# Patient Record
Sex: MALE | Race: WHITE | Employment: UNEMPLOYED | URBAN - METROPOLITAN AREA
[De-identification: names, ages, dates, MRNs, and addresses within clinical notes are randomized per-mention and may not be internally consistent; named-entity substitution may affect disease eponyms.]

---

## 2017-05-30 ENCOUNTER — TELEPHONE (OUTPATIENT)
Dept: FAMILY MEDICINE | Facility: CLINIC | Age: 57
End: 2017-05-30

## 2017-05-30 DIAGNOSIS — R23.9 SKIN CHANGE: Primary | ICD-10-CM

## 2017-05-30 NOTE — TELEPHONE ENCOUNTER
Patient has an appt scheduled in [place] for dermatology on 6/6. Patient needs to have a referral entered before they can be seen.    Erin Vanegas,   [facility]

## 2017-06-06 ENCOUNTER — OFFICE VISIT (OUTPATIENT)
Dept: DERMATOLOGY | Facility: CLINIC | Age: 57
End: 2017-06-06
Payer: COMMERCIAL

## 2017-06-06 DIAGNOSIS — L81.4 SOLAR LENTIGO: ICD-10-CM

## 2017-06-06 DIAGNOSIS — D48.5 NEOPLASM OF UNCERTAIN BEHAVIOR OF SKIN: ICD-10-CM

## 2017-06-06 DIAGNOSIS — L82.1 SEBORRHEIC KERATOSIS: ICD-10-CM

## 2017-06-06 DIAGNOSIS — L72.0 EIC (EPIDERMAL INCLUSION CYST): ICD-10-CM

## 2017-06-06 DIAGNOSIS — L57.0 AK (ACTINIC KERATOSIS): Primary | ICD-10-CM

## 2017-06-06 DIAGNOSIS — L21.9 DERMATITIS, SEBORRHEIC: ICD-10-CM

## 2017-06-06 PROCEDURE — 10040 EXTRACTION: CPT | Performed by: DERMATOLOGY

## 2017-06-06 PROCEDURE — 88342 IMHCHEM/IMCYTCHM 1ST ANTB: CPT | Mod: TC | Performed by: DERMATOLOGY

## 2017-06-06 PROCEDURE — 17003 DESTRUCT PREMALG LES 2-14: CPT | Performed by: DERMATOLOGY

## 2017-06-06 PROCEDURE — 88305 TISSUE EXAM BY PATHOLOGIST: CPT | Mod: TC | Performed by: DERMATOLOGY

## 2017-06-06 PROCEDURE — 17000 DESTRUCT PREMALG LESION: CPT | Performed by: DERMATOLOGY

## 2017-06-06 PROCEDURE — 88341 IMHCHEM/IMCYTCHM EA ADD ANTB: CPT | Mod: TC | Performed by: DERMATOLOGY

## 2017-06-06 PROCEDURE — 99243 OFF/OP CNSLTJ NEW/EST LOW 30: CPT | Mod: 25 | Performed by: DERMATOLOGY

## 2017-06-06 PROCEDURE — 11100 HC BIOPSY SKIN/SUBQ/MUC MEM, SINGLE LESION: CPT | Mod: 59 | Performed by: DERMATOLOGY

## 2017-06-06 RX ORDER — MOMETASONE FUROATE 1 MG/ML
SOLUTION TOPICAL
Qty: 60 ML | Refills: 5 | Status: SHIPPED | OUTPATIENT
Start: 2017-06-06 | End: 2019-12-05

## 2017-06-06 RX ORDER — PHENOL 1.4 %
10 AEROSOL, SPRAY (ML) MUCOUS MEMBRANE AT BEDTIME
COMMUNITY
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
## 2017-06-06 NOTE — MR AVS SNAPSHOT
After Visit Summary   6/6/2017    Ronald Ingram    MRN: [id number]           Patient Information     Date Of Birth          1960        Visit Information        Provider Department      6/6/2017 11:45 AM Rosa Jaquez MD [facility]        Today's Diagnoses     AK (actinic keratosis)    -  1    Solar lentigo        Seborrheic keratosis          Care Instructions                    Pediatric Dermatology  [facility]  [address]  1st [place]  [address]  Phone: [phone]    Pediatric & Adult Dermatology  [facility]  [address]  2nd Floor  [facility] [address]  [phone]                  General information: Dr. Rosa Jaquez is a board-certified dermatologist with subspecialty certification in pediatric dermatology.     Scheduling and Nurse Triage: Dr. Jaquez sees pediatric patients on Mondays in [place] and adult and pediatric patients on Tuesdays in [place]. The remainder of the week she practices at the [facility]. Please call the above phone numbers to schedule or to talk to a nurse.     -For scheduling at the [place] or [place] locations, or to talk to the triage nurse please call the above phone number at the clinic where you were seen.     -For medication refills, please call your pharmacy.       WOUND CARE: Wound Care After a Biopsy    How should I care for my wound for the first 24 hours?    If it bleeds, hold direct pressure on the area for 10 minutes    Acetaminophen (TylenolTM) as needed for pain    How should I care for the wound after 24 hours?    Remove the bandage     You may bathe or shower as normal    How do I clean my wound?    Use white petroleum/Vaseline  to keep sutures moist twice daily.     Replace the Bandaid /bandage to keep the wound covered until it is completely healed (not needed in the scalp)    What  should I use to clean my wound?     White petroleum jelly (Vaseline )     Bandaids   as needed    How should I care for my wound after a shave biopsy?    Keep up with wound care for one week or until the area is healed     How should I care for my wound after a punch biopsy?    Complete wound care until the stitches are removed     Stitches need to be removed in 14 days. You may return to our clinic for this or you may have it done locally at your doctor s office.    When should I call my doctor?    If you have increased:   o Pain or swelling    o Pus or drainage  o Temperature over 101? F (38.3 ? C)   Redness or warmth  around wound      Wound Care Instructions for Liquid Nitrogen Treatment    The treated areas may appear white at first. Over the next several hours a fluid-filled blister may form. The blister can be very dark. If a blister appears, it can remained blistered for up to a week, then scab over and heal.     Please leave the blistered area(s) uncovered as long as it remains closed. If the area(s) break open, you may cover it was a clean band-aid.     If the blistered area(s) become uncomfortably filled with fluid, you may release some of the fluid by puncturing the blister(s) with a needle that has been cleansed with alcohol.     If the skin covering the blister comes off, clean the area(s) daily with soap and water. Apply a small amount of Vaseline and cover with a clean band-aid. Change the band-aid twice daily until the skin is healed.     Call us if....     You have signs of a infection such as yellow or pus-like drainage from the wound site or a fever over 100 degrees fahrenheit.     You have any questions or are not sure how to take care of the wound.           Over The Counter at Home Wart Instructions:    Please follow instructions closely and do not skip days of treatment.  1. Soak warts for 10 minutes in warm water (this can be while bathing or showering).   2. Pat area dry with a towel.    3. Gently remove any whitish dead skin from the surface of the warts. Stop if it becomes painful or starts to bleed.   a. Nail files or pumice stones can be used, but should not be reused on normal skin and should not be used with others.   4. Apply Dr. Jo-Ann rothman, Compound W, DuoFilm, Wart-off or other 17% salicylic acid-containing product to cover each wart.  a. Do not apply to normal surrounding skin.  5. Cover warts with duct tape. Most patients choose to apply this at bedtime and leave overnight.   6. Repeat the steps daily if possible.     What is NORMAL?     When the tape is removed, it may pull off dead layers of skin from the wart and surrounding normal skin.     A  whitish  color to the wart and surrounding normal skin is to be expected.      Stop treatment if skin becomes too irritated.     You should continue treatment until the warts are no longer present.       -Arm spots: solar lentigines  -Seborrheic keratoses - benign growths of the top layer of the skin- scaly  -Actinic keratoses- face, precancerous. Treated with freezing          Follow-ups after your visit        Follow-up notes from your care team     Return in about 1 year (around 6/6/2018).      Who to contact     If you have questions or need follow up information about today's clinic visit or your schedule please contact [facility] directly at [phone].  Normal or non-critical lab and imaging results will be communicated to you by MyChart, letter or phone within 4 business days after the clinic has received the results. If you do not hear from us within 7 days, please contact the clinic through [employer] or phone. If you have a critical or abnormal lab result, we will notify you by phone as soon as possible.  Submit refill requests through [employer] or call your pharmacy and they will forward the refill request to us. Please allow 3 business days for your refill to be completed.          Additional Information About Your Visit    "     MyChart Information     [employer] lets you send messages to your doctor, view your test results, renew your prescriptions, schedule appointments and more. To sign up, go to [email or web address] . Click on \"Log in\" on the left side of the screen, which will take you to the Welcome page. Then click on \"Sign up Now\" on the right side of the page.     You will be asked to enter the access code listed below, as well as some personal information. Please follow the directions to create your username and password.     Your access code is: [email or web address]  Expires: 2017 12:40 PM     Your access code will  in 90 days. If you need help or a new code, please call your [facility] clinic or [phone].        Care EveryWhere ID     This is your Care EveryWhere ID. This could be used by other organizations to access your [facility] medical records  [id number]         Blood Pressure from Last 3 Encounters:   10/18/16 114/68   12/16/15 118/70   12/10/15 120/70    Weight from Last 3 Encounters:   10/18/16 250 lb 14.4 oz (113.8 kg)   12/16/15 260 lb (117.9 kg)   12/10/15 261 lb 6.4 oz (118.6 kg)              Today, you had the following     No orders found for display       Primary Care Provider Office Phone # Fax #    Karen Robbins [phone]       [facility] [address] [facility] [address]        Thank you!     Thank you for choosing [facility]  for your care. Our goal is always to provide you with excellent care. Hearing back from our patients is one way we can continue to improve our services. Please take a few minutes to complete the written survey that you may receive in the mail after your visit with us. Thank you!             Your Updated Medication List - Protect others around you: Learn how to safely use, store and throw away your medicines at www.disposemymeds.org.          This list is accurate as of: 17 12:40 PM.  Always use your most recent med " list.                   Brand Name Dispense Instructions for use    atorvastatin 40 MG tablet    LIPITOR    90 tablet    Take 1 tablet (40 mg) by mouth daily       fish oil-omega-3 fatty acids 1000 MG capsule     90 capsule    Take 1 capsule by mouth daily.       fluticasone 50 MCG/ACT spray    FLONASE    16 g    Spray 1-2 sprays into both nostrils daily       Melatonin 10 MG Tabs tablet      Take 10 mg by mouth At Bedtime       omeprazole 40 MG capsule    priLOSEC    30 capsule    Take 1 capsule (40 mg) by mouth daily Take 30-60 minutes before a meal.       ONE-A-DAY ENERGY Tabs      Take  by mouth.
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
## 2017-06-06 NOTE — PATIENT INSTRUCTIONS
Pediatric Dermatology  [facility]  [address]  1st [place]  [address]  Phone: [phone]    Pediatric & Adult Dermatology  [facility]  [address]   2nd Floor  [facility] [address]  [phone]                  General information: Dr. Rosa Jaquez is a board-certified dermatologist with subspecialty certification in pediatric dermatology.     Scheduling and Nurse Triage: Dr. Jaquez sees pediatric patients on Mondays in [place] and adult and pediatric patients on Tuesdays in [place]. The remainder of the week she practices at the [facility]. Please call the above phone numbers to schedule or to talk to a nurse.     -For scheduling at the [place] or [place] locations, or to talk to the triage nurse please call the above phone number at the clinic where you were seen.     -For medication refills, please call your pharmacy.       WOUND CARE: Wound Care After a Biopsy    How should I care for my wound for the first 24 hours?    If it bleeds, hold direct pressure on the area for 10 minutes    Acetaminophen (TylenolTM) as needed for pain    How should I care for the wound after 24 hours?    Remove the bandage     You may bathe or shower as normal    How do I clean my wound?    Use white petroleum/Vaseline  to keep sutures moist twice daily.     Replace the Bandaid /bandage to keep the wound covered until it is completely healed (not needed in the scalp)    What should I use to clean my wound?     White petroleum jelly (Vaseline )     Bandaids   as needed    How should I care for my wound after a shave biopsy?    Keep up with wound care for one week or until the area is healed     How should I care for my wound after a punch biopsy?    Complete wound care until the stitches are removed     Stitches need to be removed in 14 days. You may return to our clinic  for this or you may have it done locally at your doctor s office.    When should I call my doctor?    If you have increased:   o Pain or swelling    o Pus or drainage  o Temperature over 101? F (38.3 ? C)   Redness or warmth  around wound      Wound Care Instructions for Liquid Nitrogen Treatment    The treated areas may appear white at first. Over the next several hours a fluid-filled blister may form. The blister can be very dark. If a blister appears, it can remained blistered for up to a week, then scab over and heal.     Please leave the blistered area(s) uncovered as long as it remains closed. If the area(s) break open, you may cover it was a clean band-aid.     If the blistered area(s) become uncomfortably filled with fluid, you may release some of the fluid by puncturing the blister(s) with a needle that has been cleansed with alcohol.     If the skin covering the blister comes off, clean the area(s) daily with soap and water. Apply a small amount of Vaseline and cover with a clean band-aid. Change the band-aid twice daily until the skin is healed.     Call us if....     You have signs of a infection such as yellow or pus-like drainage from the wound site or a fever over 100 degrees fahrenheit.     You have any questions or are not sure how to take care of the wound.           Over The Counter at Home Wart Instructions:    Please follow instructions closely and do not skip days of treatment.  1. Soak warts for 10 minutes in warm water (this can be while bathing or showering).   2. Pat area dry with a towel.   3. Gently remove any whitish dead skin from the surface of the warts. Stop if it becomes painful or starts to bleed.   a. Nail files or pumice stones can be used, but should not be reused on normal skin and should not be used with others.   4. Apply Dr. Jo-Ann rothman, Compound W, DuoFilm, Wart-off or other 17% salicylic acid-containing product to cover each wart.  a. Do not apply to normal surrounding  skin.  5. Cover warts with duct tape. Most patients choose to apply this at bedtime and leave overnight.   6. Repeat the steps daily if possible.     What is NORMAL?     When the tape is removed, it may pull off dead layers of skin from the wart and surrounding normal skin.     A  whitish  color to the wart and surrounding normal skin is to be expected.      Stop treatment if skin becomes too irritated.     You should continue treatment until the warts are no longer present.       -Arm spots: solar lentigines  -Seborrheic keratoses - benign growths of the top layer of the skin- scaly  -Actinic keratoses- face, precancerous. Treated with freezing
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
## 2017-06-08 RX ORDER — LIDOCAINE HYDROCHLORIDE AND EPINEPHRINE 10; 10 MG/ML; UG/ML
3 INJECTION, SOLUTION INFILTRATION; PERINEURAL ONCE
Qty: 3 ML | Refills: 0 | OUTPATIENT
Start: 2017-06-08 | End: 2017-06-08
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
## 2017-06-14 LAB — COPATH REPORT: NORMAL

## 2017-06-19 ENCOUNTER — TELEPHONE (OUTPATIENT)
Dept: DERMATOLOGY | Facility: CLINIC | Age: 57
End: 2017-06-19

## 2017-06-19 DIAGNOSIS — C43.62 MELANOMA OF LEFT UPPER ARM (H): Primary | ICD-10-CM

## 2017-06-19 NOTE — TELEPHONE ENCOUNTER
I called the patient with results showing melanoma. Spoke with wife at length. I recommended sentinel node and wide excision. I referred to surg onc and asked our  to help coordinate. Patient to call if questions after he discusses with his wife.

## 2017-06-26 PROBLEM — C43.62 MELANOMA OF LEFT UPPER ARM (H): Status: ACTIVE | Noted: 2017-06-26

## 2017-06-30 ENCOUNTER — ONCOLOGY VISIT (OUTPATIENT)
Dept: ONCOLOGY | Facility: CLINIC | Age: 57
End: 2017-06-30
Attending: SURGERY
Payer: COMMERCIAL

## 2017-06-30 VITALS
WEIGHT: 261.5 LBS | OXYGEN SATURATION: 95 % | DIASTOLIC BLOOD PRESSURE: 70 MMHG | HEIGHT: 76 IN | SYSTOLIC BLOOD PRESSURE: 123 MMHG | HEART RATE: 77 BPM | RESPIRATION RATE: 16 BRPM | BODY MASS INDEX: 31.84 KG/M2 | TEMPERATURE: 98.6 F

## 2017-06-30 DIAGNOSIS — C43.62 MELANOMA OF LEFT UPPER ARM (H): ICD-10-CM

## 2017-06-30 PROCEDURE — 99212 OFFICE O/P EST SF 10 MIN: CPT | Mod: ZF

## 2017-06-30 ASSESSMENT — PAIN SCALES - GENERAL: PAINLEVEL: MILD PAIN (3)

## 2017-06-30 NOTE — NURSING NOTE
"Oncology Rooming Note    June 30, 2017 11:20 AM   Ronald Ingram is a 56 year old male who presents for:    Chief Complaint   Patient presents with     Oncology Clinic Visit     follw up biopsy     Initial Vitals: /70 (BP Location: Right arm, Patient Position: Chair, Cuff Size: Adult Large)  Pulse 77  Temp 98.6  F (37  C) (Oral)  Resp 16  Ht 1.93 m (6' 4\")  Wt 118.6 kg (261 lb 8 oz)  SpO2 95%  BMI 31.83 kg/m2 Estimated body mass index is 31.83 kg/(m^2) as calculated from the following:    Height as of this encounter: 1.93 m (6' 4\").    Weight as of this encounter: 118.6 kg (261 lb 8 oz). Body surface area is 2.52 meters squared.  Mild Pain (3) Comment: Data Unavailable   No LMP for male patient.  Allergies reviewed: Yes  Medications reviewed: Yes    Medications: Medication refills not needed today.  Pharmacy name entered into [facility]: [id number] - [facility] [address]    Clinical concerns:  provider was NOT notified.    7 minutes for nursing intake (face to face time)     Yelitza Galloway LPN            "

## 2017-06-30 NOTE — PROGRESS NOTES
57 y/o man noticed a rapidly growing skin lesion in the left deltoid region.  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  No prior hx of skin cancer.  No sxs of metastases  PHM: no major medical problems  FH: neg melanoma  PE: healed bx, left deltoid, no residual pigmentation; no axillary or cervicle lymphadenopathy.    Imp: high-risk melanoma  Plan: PET/CT and brain MRI (patient has new left-sided headache and neck pain). WLE + SLN; med onc appointment.    TT: 45 min  CT: 35 min

## 2017-07-03 DIAGNOSIS — C43.62 MELANOMA OF LEFT UPPER ARM (H): Primary | ICD-10-CM

## 2017-07-12 ENCOUNTER — HOSPITAL ENCOUNTER (OUTPATIENT)
Dept: PET IMAGING | Facility: CLINIC | Age: 57
Discharge: HOME | End: 2017-07-12
Attending: SURGERY | Admitting: SURGERY
Payer: COMMERCIAL

## 2017-07-12 ENCOUNTER — HOSPITAL ENCOUNTER (OUTPATIENT)
Dept: MRI IMAGING | Facility: CLINIC | Age: 57
End: 2017-07-12
Attending: SURGERY
Payer: COMMERCIAL

## 2017-07-12 DIAGNOSIS — C43.62 MELANOMA OF LEFT UPPER ARM (H): ICD-10-CM

## 2017-07-12 PROCEDURE — 71260 CT THORAX DX C+: CPT

## 2017-07-12 PROCEDURE — 78816 PET IMAGE W/CT FULL BODY: CPT | Mod: PI

## 2017-07-12 PROCEDURE — 25000128 H RX IP 250 OP 636: Performed by: SURGERY

## 2017-07-12 PROCEDURE — A9585 GADOBUTROL INJECTION: HCPCS | Performed by: SURGERY

## 2017-07-12 PROCEDURE — 82962 GLUCOSE BLOOD TEST: CPT

## 2017-07-12 PROCEDURE — 70553 MRI BRAIN STEM W/O & W/DYE: CPT

## 2017-07-12 PROCEDURE — A9552 F18 FDG: HCPCS | Performed by: SURGERY

## 2017-07-12 PROCEDURE — 34300033 ZZH RX 343: Performed by: SURGERY

## 2017-07-12 RX ORDER — IOPAMIDOL 755 MG/ML
45-150 INJECTION, SOLUTION INTRAVASCULAR ONCE
Status: COMPLETED | OUTPATIENT
Start: 2017-07-12 | End: 2017-07-12

## 2017-07-12 RX ORDER — GADOBUTROL 604.72 MG/ML
10 INJECTION INTRAVENOUS ONCE
Status: COMPLETED | OUTPATIENT
Start: 2017-07-12 | End: 2017-07-12

## 2017-07-12 RX ADMIN — IOPAMIDOL 135 ML: 755 INJECTION, SOLUTION INTRAVENOUS at 09:51

## 2017-07-12 RX ADMIN — FLUDEOXYGLUCOSE F-18 16.54 MCI.: 500 INJECTION, SOLUTION INTRAVENOUS at 09:00

## 2017-07-12 RX ADMIN — GADOBUTROL 10 ML: 604.72 INJECTION INTRAVENOUS at 11:02

## 2017-07-13 ENCOUNTER — OFFICE VISIT (OUTPATIENT)
Dept: FAMILY MEDICINE | Facility: CLINIC | Age: 57
End: 2017-07-13
Payer: COMMERCIAL

## 2017-07-13 VITALS
DIASTOLIC BLOOD PRESSURE: 68 MMHG | OXYGEN SATURATION: 97 % | TEMPERATURE: 98.5 F | WEIGHT: 262.6 LBS | BODY MASS INDEX: 31.98 KG/M2 | HEART RATE: 88 BPM | RESPIRATION RATE: 18 BRPM | SYSTOLIC BLOOD PRESSURE: 118 MMHG | HEIGHT: 76 IN

## 2017-07-13 DIAGNOSIS — E78.2 MIXED HYPERLIPIDEMIA: ICD-10-CM

## 2017-07-13 DIAGNOSIS — Z01.818 PREOP GENERAL PHYSICAL EXAM: Primary | ICD-10-CM

## 2017-07-13 DIAGNOSIS — C43.9 METASTATIC MALIGNANT MELANOMA (H): ICD-10-CM

## 2017-07-13 LAB — GLUCOSE BLDC GLUCOMTR-MCNC: 96 MG/DL (ref 70–99)

## 2017-07-13 PROCEDURE — 99214 OFFICE O/P EST MOD 30 MIN: CPT | Performed by: INTERNAL MEDICINE

## 2017-07-13 PROCEDURE — 93000 ELECTROCARDIOGRAM COMPLETE: CPT | Performed by: INTERNAL MEDICINE

## 2017-07-13 NOTE — NURSING NOTE
"Chief Complaint   Patient presents with     Pre-Op Exam       Initial /68 (BP Location: Right arm, Patient Position: Chair, Cuff Size: Adult Large)  Pulse 88  Temp 98.5  F (36.9  C) (Oral)  Resp 18  Ht 6' 4\" (1.93 m)  Wt 262 lb 9.6 oz (119.1 kg)  SpO2 97%  BMI 31.96 kg/m2 Estimated body mass index is 31.96 kg/(m^2) as calculated from the following:    Height as of this encounter: 6' 4\" (1.93 m).    Weight as of this encounter: 262 lb 9.6 oz (119.1 kg).  Medication Reconciliation: complete   Derek Pearson Student MA  "

## 2017-07-13 NOTE — MR AVS SNAPSHOT
After Visit Summary   7/13/2017    Ronald Ingram    MRN: [id number]           Patient Information     Date Of Birth          1960        Visit Information        Provider Department      7/13/2017 3:00 PM Karen Robbins MD [facility]        Today's Diagnoses     Preop general physical exam    -  1    Metastatic malignant melanoma (H)          Care Instructions    --Approval given to proceed with proposed procedure, without further diagnostic evaluation.  --Avoid NSAIDS (Motrin, Ibuprofen, Aleve or Naprosyn) one week prior to surgery; If needed, Tylenol or Acetaminophen are fine to use.  --Hold fish oil  --OK to continue melatonin.   --Pain medications, time off from work and FMLA following surgery deferred to surgeon.      Before Your Surgery      Call your surgeon if there is any change in your health. This includes signs of a cold or flu (such as a sore throat, runny nose, cough, rash or fever).    Do not smoke, drink alcohol or take over the counter medicine (unless your surgeon or primary care doctor tells you to) for the 24 hours before and after surgery.    If you take prescribed drugs: Follow your doctor s orders about which medicines to take and which to stop until after surgery.    Eating and drinking prior to surgery: follow the instructions from your surgeon    Take a shower or bath the night before surgery. Use the soap your surgeon gave you to gently clean your skin. If you do not have soap from your surgeon, use your regular soap. Do not shave or scrub the surgery site.  Wear clean pajamas and have clean sheets on your bed.           Follow-ups after your visit        Your next 10 appointments already scheduled     Jul 18, 2017  7:30 AM CDT   NM LYMPH SYSTEM MELANOMA with UUNM3   [facility] [place], Nuclear Medicine ([facility])    [address] [facility] [address] [phone]           Please bring a  list of your medicines to the exam. (Include vitamins, minerals and over-the-counter drugs.) You should wear comfortable clothes. Leave your valuables at home. Please bring related prior results and films.  Tell your doctor:   If you are breastfeeding or may be pregnant.   If you have had a barium test within the past few days. Barium may change the results of certain exams.   If you think you may need sedation (medicine to help you relax).  You may eat and drink as normal.  Please call your Imaging Department at your exam site with any questions.            Jul 18, 2017   Procedure with Lele Hoyos MD   [facility] ([facility])    [address] [place] [address]  5th Floor  [facility] [address] [phone]           Located in the Clinics and Surgery Center at [address] [place] [address].   parking is very convenient and highly recommended.  is a $6 flat rate fee.  Both  and self parkers should enter the main arrival plaza from [facility]; parking attendants will direct you based on your parking preference.              Future tests that were ordered for you today     Open Future Orders        Priority Expected Expires Ordered    PET Oncology Whole Body Routine  6/30/2018 6/30/2017            Who to contact     If you have questions or need follow up information about today's clinic visit or your schedule please contact [facility] directly at [phone].  Normal or non-critical lab and imaging results will be communicated to you by MyChart, letter or phone within 4 business days after the clinic has received the results. If you do not hear from us within 7 days, please contact the clinic through MyChart or phone. If you have a critical or abnormal lab result, we will notify you by phone as soon as possible.  Submit refill requests through [employer] or call your pharmacy and they will forward the refill request to us.  "Please allow 3 business days for your refill to be completed.          Additional Information About Your Visit        MyChart Information     [employer] gives you secure access to your electronic health record. If you see a primary care provider, you can also send messages to your care team and make appointments. If you have questions, please call your primary care clinic.  If you do not have a primary care provider, please call [phone] and they will assist you.        Care EveryWhere ID     This is your Care EveryWhere ID. This could be used by other organizations to access your [place] medical records  [id number]        Your Vitals Were     Pulse Temperature Respirations Height Pulse Oximetry BMI (Body Mass Index)    88 98.5  F (36.9  C) (Oral) 18 6' 4\" (1.93 m) 97% 31.96 kg/m2       Blood Pressure from Last 3 Encounters:   07/13/17 118/68   06/30/17 123/70   10/18/16 114/68    Weight from Last 3 Encounters:   07/13/17 262 lb 9.6 oz (119.1 kg)   06/30/17 261 lb 8 oz (118.6 kg)   10/18/16 250 lb 14.4 oz (113.8 kg)              We Performed the Following     EKG 12-lead complete w/read - Clinics        Primary Care Provider Office Phone # Fax #    Karen Kal Robbins [phone]       [facility] [address] [facility] [address]        Equal Access to Services     [facility]: Hadii aad ku hadasho Soomaali, waaxda luqadaha, qaybta kaalmada adeegyada, waxay saqib hayoc alejandro . So [facility] [phone].    ATENCIÓN: Si habla español, tiene a song disposición servicios gratuitos de asistencia lingüística. Llame al [phone].    We comply with applicable federal civil rights laws and [place] laws. We do not discriminate on the basis of race, color, national origin, age, disability sex, sexual orientation or gender identity.            Thank you!     Thank you for choosing [facility]  for your care. Our goal is always to provide you with " excellent care. Hearing back from our patients is one way we can continue to improve our services. Please take a few minutes to complete the written survey that you may receive in the mail after your visit with us. Thank you!             Your Updated Medication List - Protect others around you: Learn how to safely use, store and throw away your medicines at www.disposemymeds.org.          This list is accurate as of: 7/13/17  3:47 PM.  Always use your most recent med list.                   Brand Name Dispense Instructions for use Diagnosis    atorvastatin 40 MG tablet    LIPITOR    90 tablet    Take 1 tablet (40 mg) by mouth daily    Mixed hyperlipidemia       fish oil-omega-3 fatty acids 1000 MG capsule     90 capsule    Take 1 capsule by mouth daily.        fluticasone 50 MCG/ACT spray    FLONASE    16 g    Spray 1-2 sprays into both nostrils daily    Post-nasal drainage       Melatonin 10 MG Tabs tablet      Take 10 mg by mouth At Bedtime        mometasone 0.1 % solution (lotion)    ELOCON    60 mL    Nightly to scalp rash as needed.    Dermatitis, seborrheic       omeprazole 40 MG capsule    priLOSEC    30 capsule    Take 1 capsule (40 mg) by mouth daily Take 30-60 minutes before a meal.    Gastroesophageal reflux disease, esophagitis presence not specified       ONE-A-DAY ENERGY Tabs      Take  by mouth.

## 2017-07-13 NOTE — PATIENT INSTRUCTIONS
--Approval given to proceed with proposed procedure, without further diagnostic evaluation.  --Avoid NSAIDS (Motrin, Ibuprofen, Aleve or Naprosyn) one week prior to surgery; If needed, Tylenol or Acetaminophen are fine to use.  --Hold fish oil  --OK to continue melatonin.   --Pain medications, time off from work and FMLA following surgery deferred to surgeon.      Before Your Surgery      Call your surgeon if there is any change in your health. This includes signs of a cold or flu (such as a sore throat, runny nose, cough, rash or fever).    Do not smoke, drink alcohol or take over the counter medicine (unless your surgeon or primary care doctor tells you to) for the 24 hours before and after surgery.    If you take prescribed drugs: Follow your doctor s orders about which medicines to take and which to stop until after surgery.    Eating and drinking prior to surgery: follow the instructions from your surgeon    Take a shower or bath the night before surgery. Use the soap your surgeon gave you to gently clean your skin. If you do not have soap from your surgeon, use your regular soap. Do not shave or scrub the surgery site.  Wear clean pajamas and have clean sheets on your bed.
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
## 2017-07-17 ENCOUNTER — ANESTHESIA EVENT (OUTPATIENT)
Dept: SURGERY | Facility: AMBULATORY SURGERY CENTER | Age: 57
End: 2017-07-17

## 2017-07-18 ENCOUNTER — ANESTHESIA (OUTPATIENT)
Dept: SURGERY | Facility: AMBULATORY SURGERY CENTER | Age: 57
End: 2017-07-18

## 2017-07-18 ENCOUNTER — SURGERY (OUTPATIENT)
Age: 57
End: 2017-07-18

## 2017-07-18 ENCOUNTER — HOSPITAL ENCOUNTER (OUTPATIENT)
Dept: NUCLEAR MEDICINE | Facility: CLINIC | Age: 57
Setting detail: NUCLEAR MEDICINE
Discharge: HOME | End: 2017-07-18
Attending: SURGERY | Admitting: SURGERY
Payer: COMMERCIAL

## 2017-07-18 ENCOUNTER — HOSPITAL ENCOUNTER (OUTPATIENT)
Facility: AMBULATORY SURGERY CENTER | Age: 57
End: 2017-07-18
Attending: SURGERY

## 2017-07-18 VITALS
RESPIRATION RATE: 16 BRPM | TEMPERATURE: 97.1 F | DIASTOLIC BLOOD PRESSURE: 75 MMHG | SYSTOLIC BLOOD PRESSURE: 120 MMHG | WEIGHT: 262 LBS | OXYGEN SATURATION: 97 % | HEIGHT: 76 IN | BODY MASS INDEX: 31.9 KG/M2

## 2017-07-18 DIAGNOSIS — C43.62 MALIGNANT MELANOMA OF LEFT UPPER EXTREMITY INCLUDING SHOULDER (H): Primary | ICD-10-CM

## 2017-07-18 DIAGNOSIS — C43.62 MELANOMA OF LEFT UPPER ARM (H): ICD-10-CM

## 2017-07-18 PROCEDURE — 78195 LYMPH SYSTEM IMAGING: CPT

## 2017-07-18 PROCEDURE — 34300033 ZZH RX 343: Performed by: SURGERY

## 2017-07-18 PROCEDURE — A9520 TC99 TILMANOCEPT DIAG 0.5MCI: HCPCS | Performed by: SURGERY

## 2017-07-18 RX ORDER — ISOSULFAN BLUE 50 MG/5ML
INJECTION, SOLUTION SUBCUTANEOUS PRN
Status: DISCONTINUED | OUTPATIENT
Start: 2017-07-18 | End: 2017-07-18 | Stop reason: HOSPADM

## 2017-07-18 RX ORDER — FENTANYL CITRATE 50 UG/ML
25-50 INJECTION, SOLUTION INTRAMUSCULAR; INTRAVENOUS EVERY 5 MIN PRN
Status: DISCONTINUED | OUTPATIENT
Start: 2017-07-18 | End: 2017-07-18 | Stop reason: HOSPADM

## 2017-07-18 RX ORDER — NALOXONE HYDROCHLORIDE 0.4 MG/ML
.1-.4 INJECTION, SOLUTION INTRAMUSCULAR; INTRAVENOUS; SUBCUTANEOUS
Status: DISCONTINUED | OUTPATIENT
Start: 2017-07-18 | End: 2017-07-19 | Stop reason: HOSPADM

## 2017-07-18 RX ORDER — BUPIVACAINE HYDROCHLORIDE 2.5 MG/ML
INJECTION, SOLUTION INFILTRATION; PERINEURAL PRN
Status: DISCONTINUED | OUTPATIENT
Start: 2017-07-18 | End: 2017-07-18 | Stop reason: HOSPADM

## 2017-07-18 RX ORDER — OXYCODONE AND ACETAMINOPHEN 5; 325 MG/1; MG/1
1-2 TABLET ORAL EVERY 4 HOURS PRN
Qty: 10 TABLET | Refills: 0 | Status: SHIPPED | OUTPATIENT
Start: 2017-07-18 | End: 2018-01-24

## 2017-07-18 RX ORDER — AMOXICILLIN 250 MG
1-2 CAPSULE ORAL 2 TIMES DAILY
Qty: 30 TABLET | Refills: 0 | Status: SHIPPED | OUTPATIENT
Start: 2017-07-18 | End: 2018-01-24

## 2017-07-18 RX ORDER — PROPOFOL 10 MG/ML
INJECTION, EMULSION INTRAVENOUS PRN
Status: DISCONTINUED | OUTPATIENT
Start: 2017-07-18 | End: 2017-07-18

## 2017-07-18 RX ORDER — GABAPENTIN 300 MG/1
300 CAPSULE ORAL ONCE
Status: COMPLETED | OUTPATIENT
Start: 2017-07-18 | End: 2017-07-18

## 2017-07-18 RX ORDER — LIDOCAINE HYDROCHLORIDE 20 MG/ML
INJECTION, SOLUTION INFILTRATION; PERINEURAL PRN
Status: DISCONTINUED | OUTPATIENT
Start: 2017-07-18 | End: 2017-07-18

## 2017-07-18 RX ORDER — MEPERIDINE HYDROCHLORIDE 25 MG/ML
12.5 INJECTION INTRAMUSCULAR; INTRAVENOUS; SUBCUTANEOUS
Status: DISCONTINUED | OUTPATIENT
Start: 2017-07-18 | End: 2017-07-19 | Stop reason: HOSPADM

## 2017-07-18 RX ORDER — ONDANSETRON 2 MG/ML
INJECTION INTRAMUSCULAR; INTRAVENOUS PRN
Status: DISCONTINUED | OUTPATIENT
Start: 2017-07-18 | End: 2017-07-18

## 2017-07-18 RX ORDER — CEFAZOLIN SODIUM 1 G/3ML
1 INJECTION, POWDER, FOR SOLUTION INTRAMUSCULAR; INTRAVENOUS SEE ADMIN INSTRUCTIONS
Status: DISCONTINUED | OUTPATIENT
Start: 2017-07-18 | End: 2017-07-18 | Stop reason: HOSPADM

## 2017-07-18 RX ORDER — DEXAMETHASONE SODIUM PHOSPHATE 4 MG/ML
INJECTION, SOLUTION INTRA-ARTICULAR; INTRALESIONAL; INTRAMUSCULAR; INTRAVENOUS; SOFT TISSUE PRN
Status: DISCONTINUED | OUTPATIENT
Start: 2017-07-18 | End: 2017-07-18

## 2017-07-18 RX ORDER — FENTANYL CITRATE 50 UG/ML
INJECTION, SOLUTION INTRAMUSCULAR; INTRAVENOUS PRN
Status: DISCONTINUED | OUTPATIENT
Start: 2017-07-18 | End: 2017-07-18

## 2017-07-18 RX ORDER — MAGNESIUM HYDROXIDE 1200 MG/15ML
LIQUID ORAL PRN
Status: DISCONTINUED | OUTPATIENT
Start: 2017-07-18 | End: 2017-07-18 | Stop reason: HOSPADM

## 2017-07-18 RX ORDER — LIDOCAINE 40 MG/G
CREAM TOPICAL
Status: DISCONTINUED | OUTPATIENT
Start: 2017-07-18 | End: 2017-07-18 | Stop reason: HOSPADM

## 2017-07-18 RX ORDER — ONDANSETRON 4 MG/1
4 TABLET, ORALLY DISINTEGRATING ORAL EVERY 30 MIN PRN
Status: DISCONTINUED | OUTPATIENT
Start: 2017-07-18 | End: 2017-07-19 | Stop reason: HOSPADM

## 2017-07-18 RX ORDER — ACETAMINOPHEN 325 MG/1
975 TABLET ORAL ONCE
Status: COMPLETED | OUTPATIENT
Start: 2017-07-18 | End: 2017-07-18

## 2017-07-18 RX ORDER — SODIUM CHLORIDE, SODIUM LACTATE, POTASSIUM CHLORIDE, CALCIUM CHLORIDE 600; 310; 30; 20 MG/100ML; MG/100ML; MG/100ML; MG/100ML
INJECTION, SOLUTION INTRAVENOUS CONTINUOUS
Status: DISCONTINUED | OUTPATIENT
Start: 2017-07-18 | End: 2017-07-19 | Stop reason: HOSPADM

## 2017-07-18 RX ORDER — ONDANSETRON 2 MG/ML
4 INJECTION INTRAMUSCULAR; INTRAVENOUS EVERY 30 MIN PRN
Status: DISCONTINUED | OUTPATIENT
Start: 2017-07-18 | End: 2017-07-19 | Stop reason: HOSPADM

## 2017-07-18 RX ORDER — GINSENG 100 MG
CAPSULE ORAL PRN
Status: DISCONTINUED | OUTPATIENT
Start: 2017-07-18 | End: 2017-07-18 | Stop reason: HOSPADM

## 2017-07-18 RX ORDER — SODIUM CHLORIDE, SODIUM LACTATE, POTASSIUM CHLORIDE, CALCIUM CHLORIDE 600; 310; 30; 20 MG/100ML; MG/100ML; MG/100ML; MG/100ML
INJECTION, SOLUTION INTRAVENOUS CONTINUOUS
Status: DISCONTINUED | OUTPATIENT
Start: 2017-07-18 | End: 2017-07-18 | Stop reason: HOSPADM

## 2017-07-18 RX ORDER — OXYCODONE AND ACETAMINOPHEN 5; 325 MG/1; MG/1
1-2 TABLET ORAL
Status: COMPLETED | OUTPATIENT
Start: 2017-07-18 | End: 2017-07-18

## 2017-07-18 RX ADMIN — ONDANSETRON 4 MG: 2 INJECTION INTRAMUSCULAR; INTRAVENOUS at 13:24

## 2017-07-18 RX ADMIN — FENTANYL CITRATE 50 MCG: 50 INJECTION, SOLUTION INTRAMUSCULAR; INTRAVENOUS at 11:36

## 2017-07-18 RX ADMIN — GABAPENTIN 300 MG: 300 CAPSULE ORAL at 10:26

## 2017-07-18 RX ADMIN — FENTANYL CITRATE 50 MCG: 50 INJECTION, SOLUTION INTRAMUSCULAR; INTRAVENOUS at 12:11

## 2017-07-18 RX ADMIN — BUPIVACAINE HYDROCHLORIDE 12 ML: 2.5 INJECTION, SOLUTION INFILTRATION; PERINEURAL at 12:51

## 2017-07-18 RX ADMIN — PROPOFOL 200 MG: 10 INJECTION, EMULSION INTRAVENOUS at 11:36

## 2017-07-18 RX ADMIN — MAGNESIUM HYDROXIDE 500 ML: 1200 LIQUID ORAL at 12:53

## 2017-07-18 RX ADMIN — ISOSULFAN BLUE 3 ML: 50 INJECTION, SOLUTION SUBCUTANEOUS at 12:23

## 2017-07-18 RX ADMIN — LIDOCAINE HYDROCHLORIDE 100 MG: 20 INJECTION, SOLUTION INFILTRATION; PERINEURAL at 11:36

## 2017-07-18 RX ADMIN — Medication 1 G: at 13:11

## 2017-07-18 RX ADMIN — TILMANOCEPT 0.5 MILLICURIE: KIT at 08:25

## 2017-07-18 RX ADMIN — DEXAMETHASONE SODIUM PHOSPHATE 4 MG: 4 INJECTION, SOLUTION INTRA-ARTICULAR; INTRALESIONAL; INTRAMUSCULAR; INTRAVENOUS; SOFT TISSUE at 11:51

## 2017-07-18 RX ADMIN — Medication 1000 ML: at 12:54

## 2017-07-18 RX ADMIN — OXYCODONE AND ACETAMINOPHEN 1 TABLET: 5; 325 TABLET ORAL at 15:09

## 2017-07-18 RX ADMIN — ACETAMINOPHEN 975 MG: 325 TABLET ORAL at 10:26

## 2017-07-18 RX ADMIN — SODIUM CHLORIDE, SODIUM LACTATE, POTASSIUM CHLORIDE, CALCIUM CHLORIDE: 600; 310; 30; 20 INJECTION, SOLUTION INTRAVENOUS at 10:26

## 2017-07-18 NOTE — DISCHARGE INSTRUCTIONS
"[facility]  Home Care Following Anesthesia  For 24 hours after surgery:  1. Get plenty of rest.  A responsible adult must stay with you for at least 24 hours after you leave the surgery center.  2. Do not drive or use heavy equipment.  If you have weakness or tingling, don't drive or use heavy equipment until this feeling goes away.   3. Do not drink alcohol.   4. Avoid strenuous or risky activities.  Ask for help when climbing stairs.  5. You may feel lightheaded.  IF so, sit for a few minutes before standing.  Have someone help you get up.   6. If you have nausea (feel sick to your stomach): Drink only clear liquids such as apple juice, ginger ale, broth or 7-Up.  Rest may also help.  Be sure to drink enough fluids.  Move to a regular diet as you feel able.   7. You may have a slight fever.  Call the doctor if your fever is over 100 F (37.7 C) (taken under the tongue) or lasts longer than 24 hours.  8. You may have a dry mouth, a sore throat, muscle aches or trouble sleeping. These should go away after 24 hours.  9. Do not make important or legal decisions.        Today you received a Marcaine or bupivacaine block to numb the nerves near your surgery site.  This is a block using local anesthetic or \"numbing\" medication injected around the nerves to anesthetize or \"numb\" the area supplied by those nerves.  This block is injected into the muscle layer near your surgical site.  The medication may numb the location where you had surgery for 6-18 hours, but may last up to 24 hours.  If your surgical site is an arm or leg you should be careful with your affected limb, since it is possible to injure your limb without being aware of it due to the numbing.  Until full feeling returns, you should guard against bumping or hitting your limb, and avoid extreme hot or cold temperatures on the skin.  As the block wears off, the feeling will return as a tingling or prickly sensation near your " surgical site.  You will experience more discomfort from your incision as the feeling returns.  You may want to take a pain pill (a narcotic or Tylenol if this was prescribed by your surgeon) when you start to experience mild pain before the pain beccomes more severe.  If your pain medications do not control your pain you should notifiy your surgeon.    Tips for taking pain medications  To get the best pain relief possible, remember these points:    Take pain medications as directed, before pain becomes severe.    Pain medication can upset your stomach: taking it with food may help.    Constipation is a common side effect of pain medication. Drink plenty of  fluids.    Eat foods high in fiber. Take a stool softener if recommended by your doctor or pharmacist.    Do not drink alcohol, drive or operate machinery while taking pain medications.    Ask about other ways to control pain, such as with heat, ice or relaxation.    Call a doctor for any of the followin. Signs of infection (fever, growing tenderness at the surgery site, a large amount of drainage or bleeding, severe pain, foul-smelling drainage, redness, swelling).  2. It has been over 8 to 10 hours since surgery and you are still not able to urinate (pass water).  3. Headache for over 24 hours.  4. Numbness, tingling or weakness the day after surgery (if you had spinal anesthesia).  Your doctor is:       Dr. Lele Hoyos, [facility]: [phone]               Or dial [phone] and ask for the resident on call for:  [facility]  For emergency care, call the:  [facility]:  [phone] (TTY for hearing impaired: [phone])    You have been prescribed a narcotic pain reliever that does contain Tylenol/acetaminophen.      If you feel your pain relief is insufficient, you may take Tylenol/acetaminophen in addition to your narcotic pain medication.       Be careful not to exceed 3,000 mg of Tylenol/acetaminophen in a 24 hour  period.      If you are taking extra strength Tylenol/acetaminophen (500 mg), the maximum dose is 6 tablets in 24 hours.      If you are taking regular strength acetaminophen (325 mg), the maximum dose is 9 tablets in 24 hours.

## 2017-07-18 NOTE — BRIEF OP NOTE
[facility]    Brief Operative Note    Pre-operative diagnosis: Left Arm Melanoma  Post-operative diagnosis Same    Procedure: Procedure(s):  Wide Local Excision of Left Arm Melanoma and [place] Lymph Node Biopsy - Wound Class: I-Clean    Surgeon: Surgeon(s) and Role:    * Lele Hoyos MD - Primary     Giulia Fernando MD - Resident     Jarret Zaidi - MS4    Anesthesia: General   Estimated blood loss: 5 ml  Drains: None    Specimens:   ID Type Source Tests Collected by Time Destination   A : Left Axillary sentinel node #1 Tissue Axilla, Left SURGICAL PATHOLOGY EXAM Lele Hoyos MD 7/18/2017 12:29 PM    B : Left shoulder tissue stitch at 1200 Tissue Shoulder SURGICAL PATHOLOGY EXAM Lele Hoyos MD 7/18/2017 12:45 PM      Findings:   1 sentinel lymph node excised, hot (count 117) but not blue. Background count 5 max. WLE shoulder melanoma with 2 cm margins..  Complications: None.  Implants: None.    - - - - - - - - - - - - - - - - - -  Giulia Fernando MD  General Surgery PGY-2

## 2017-07-18 NOTE — IP AVS SNAPSHOT
[facility]    [address] [place] [address]    [address] [facility] [address]    Phone:  [phone]    Fax:  [phone]                                       After Visit Summary   7/18/2017    Ronald Ingram    MRN: [id number]           After Visit Summary Signature Page     I have received my discharge instructions, and my questions have been answered. I have discussed any challenges I see with this plan with the nurse or doctor.    ..........................................................................................................................................  Patient/Patient Representative Signature      ..........................................................................................................................................  Patient Representative Print Name and Relationship to Patient    ..................................................               ................................................  Date                                            Time    ..........................................................................................................................................  Reviewed by Signature/Title    ...................................................              ..............................................  Date                                                            Time

## 2017-07-18 NOTE — ANESTHESIA POSTPROCEDURE EVALUATION
"Patient: Ronald Ingram    Procedure(s):  Wide Local Excision of Left Arm Melanoma and [place] Lymph Node Biopsy - Wound Class: I-Clean    Diagnosis:Left Arm Melanoma  Diagnosis Additional Information: No value filed.    Anesthesia Type:  General, LMA    Note:  Anesthesia Post Evaluation    Patient location during evaluation: Phase 2  Patient participation: Able to fully participate in evaluation  Level of consciousness: awake and alert  Pain management: adequate  Airway patency: patent  Cardiovascular status: acceptable and hemodynamically stable  Respiratory status: acceptable  Hydration status: acceptable  PONV: none     Anesthetic complications: None    Comments: Prior to discharge from phase II, the patient had a short episode of \"chest tightness\" that was self-limited.  The discomfort did not radiate to the patient's jaw or left arm.  The patient denied associated nausea.  No arrhythmogenic changes or hemodynamic instability noted on monitor.  The patient's \"chest tightness\" was alleviated after the patient \"belched a few times\" and his pain was subsequently localized to his axillary incision.   A 12-lead EKG obtained at this time showed normal sinus rhythm with no significant changes from a prior EKG obtained on 07/03/2017.  The patient does not have a personal history of coronary artery disease although he does have a strong family history.  He denies a history of exertional angina.  As this episode of chest pain/pressure was unlikely cardiac in nature, the patient was discharged home.          Last vitals:  Vitals:    07/18/17 1400 07/18/17 1415 07/18/17 1424   BP: 119/77 120/79 121/78   Resp: 16 14 14   Temp:  36.9  C (98.5  F) 36.6  C (97.9  F)   SpO2: 99% 95% 96%         Electronically Signed By: Tello Stroud MD  July 18, 2017  2:32 PM  "

## 2017-07-18 NOTE — IP AVS SNAPSHOT
MRN:[id number]                      After Visit Summary   7/18/2017    Ronald Ingram    MRN: [id number]           Thank you!     Thank you for choosing [facility] for your care. Our goal is always to provide you with excellent care. Hearing back from our patients is one way we can continue to improve our services. Please take a few minutes to complete the written survey that you may receive in the mail after you visit with us. Thank you!        Patient Information     Date Of Birth          1960        About your hospital stay     You were admitted on:  July 18, 2017 [facility]    You were discharged on:  July 18, 2017       Who to Call     For medical emergencies, please call 911.  For non-urgent questions about your medical care, please call your primary care provider or clinic, [phone]  For questions related to your surgery, please call your surgery clinic        Attending Provider     Provider Lele Barnes MD General Surgery       Primary Care Provider Office Phone # Fax #    Karen Kal Robbins [phone]      After Care Instructions     Discharge Instructions       Instructions from Dr. Hoyos:    1. Follow up with appointment in 2 weeks. Your pathology report will be reviewed with you in person in clinic.  2. NO LIFTING > 10 LBS AND NO STRENUOUS ACTIVITY FOR 2 WEEKS AFTER SURGERY. You may gently stretch your arm/shoulders and reach overhead.  3. Do not drive while taking narcotic pain medication (Percocet).   4. May take plain Tylenol (acetaminophen) as needed for pain. Do not take at the same time as Percocet as you can overdose on Tylenol.   5. Avoid non-steroidal anti-inflammatory medications (Advil, Ibuprofen, Naproxen, aspirin, etc) for 5-7 days.   6. Caution with putting ice on the incision as it will be numb you will not realize it if you leave the ice for too long and can damage your  "skin.  7. If you develop any fever/chills, worsening pain, redness, swelling, or drainage from your wound please call the clinic (Monday through Friday 8:00am-5:00pm [phone] Latanya RN) or on-call surgical oncology resident (nights and weekends [phone] and ask \"I would like to page the Surgical Oncology Resident on call.\")    8. Keep dressing clean and dry. Okay to shower in 2 days. May remove outer large dressing in 2 days prior to shower.  You have permanent sutures that Dr. Hoyos will remove in clinic.  May apply Bacitracin or 9. Neosporin to the incision. You also have steristrips and dissolvable sutures; the steristrips will fall off on their own in 10 to 14 days. Please refrain from submerging in a bathtub or swimming pool. Please refrain from applying alcohol or peroxide to the incision.  10. There may be a burning sensation in the armpit incision, as well as along the inside of the upper arm, which is common after a sentinel lymph node biopsy.                  Further instructions from your care team       [facility]  Home Care Following Anesthesia  For 24 hours after surgery:  1. Get plenty of rest.  A responsible adult must stay with you for at least 24 hours after you leave the surgery center.  2. Do not drive or use heavy equipment.  If you have weakness or tingling, don't drive or use heavy equipment until this feeling goes away.   3. Do not drink alcohol.   4. Avoid strenuous or risky activities.  Ask for help when climbing stairs.  5. You may feel lightheaded.  IF so, sit for a few minutes before standing.  Have someone help you get up.   6. If you have nausea (feel sick to your stomach): Drink only clear liquids such as apple juice, ginger ale, broth or 7-Up.  Rest may also help.  Be sure to drink enough fluids.  Move to a regular diet as you feel able.   7. You may have a slight fever.  Call the doctor if your fever is over 100 F (37.7 C) (taken under the " "tongue) or lasts longer than 24 hours.  8. You may have a dry mouth, a sore throat, muscle aches or trouble sleeping. These should go away after 24 hours.  9. Do not make important or legal decisions.        Today you received a Marcaine or bupivacaine block to numb the nerves near your surgery site.  This is a block using local anesthetic or \"numbing\" medication injected around the nerves to anesthetize or \"numb\" the area supplied by those nerves.  This block is injected into the muscle layer near your surgical site.  The medication may numb the location where you had surgery for 6-18 hours, but may last up to 24 hours.  If your surgical site is an arm or leg you should be careful with your affected limb, since it is possible to injure your limb without being aware of it due to the numbing.  Until full feeling returns, you should guard against bumping or hitting your limb, and avoid extreme hot or cold temperatures on the skin.  As the block wears off, the feeling will return as a tingling or prickly sensation near your surgical site.  You will experience more discomfort from your incision as the feeling returns.  You may want to take a pain pill (a narcotic or Tylenol if this was prescribed by your surgeon) when you start to experience mild pain before the pain beccomes more severe.  If your pain medications do not control your pain you should notifiy your surgeon.    Tips for taking pain medications  To get the best pain relief possible, remember these points:    Take pain medications as directed, before pain becomes severe.    Pain medication can upset your stomach: taking it with food may help.    Constipation is a common side effect of pain medication. Drink plenty of  fluids.    Eat foods high in fiber. Take a stool softener if recommended by your doctor or pharmacist.    Do not drink alcohol, drive or operate machinery while taking pain medications.    Ask about other ways to control pain, such as with heat, " "ice or relaxation.    Call a doctor for any of the followin. Signs of infection (fever, growing tenderness at the surgery site, a large amount of drainage or bleeding, severe pain, foul-smelling drainage, redness, swelling).  2. It has been over 8 to 10 hours since surgery and you are still not able to urinate (pass water).  3. Headache for over 24 hours.  4. Numbness, tingling or weakness the day after surgery (if you had spinal anesthesia).  Your doctor is:       Dr. Lele Hoyos, [facility]: [phone]               Or dial [phone] and ask for the resident on call for:  [facility]  For emergency care, call the:  [facility]:  [phone] (TTY for hearing impaired: [phone])    You have been prescribed a narcotic pain reliever that does contain Tylenol/acetaminophen.      If you feel your pain relief is insufficient, you may take Tylenol/acetaminophen in addition to your narcotic pain medication.       Be careful not to exceed 3,000 mg of Tylenol/acetaminophen in a 24 hour period.      If you are taking extra strength Tylenol/acetaminophen (500 mg), the maximum dose is 6 tablets in 24 hours.      If you are taking regular strength acetaminophen (325 mg), the maximum dose is 9 tablets in 24 hours.                  Pending Results     No orders found from 2017 to 2017.            Admission Information     Date & Time Provider Department Dept. Phone    2017 Lele Hoyos MD [facility] [phone]      Your Vitals Were     Blood Pressure Temperature Respirations Height Weight Pulse Oximetry    110/86 97.4  F (36.3  C) (Temporal) 16 1.93 m (6' 4\") 118.8 kg (262 lb) 98%    BMI (Body Mass Index)                   31.89 kg/m2           [employer] Information     [employer] gives you secure access to your electronic health record. If you see a primary care provider, you can also send messages to your care team and make appointments. If you have " questions, please call your primary care clinic.  If you do not have a primary care provider, please call [phone] and they will assist you.      "[employer] is an electronic gateway that provides easy, online access to your medical records. With "[employer] you can request a clinic appointment, read your test results, renew a prescription or communicate with your care team.     To access your existing account, please contact your [facility] or call [phone] for assistance.        Care EveryWhere ID     This is your Care EveryWhere ID. This could be used by other organizations to access your [facility] medical records  [id number]        Equal Access to Services     JONATHAN BIANCHI : Marily Salcedo, silverio brock, zoey aguilar, blair davalos. So [facility] [phone].    ATENCIÓN: Si habla español, tiene a song disposición servicios gratuitos de asistencia lingüística. Llame al [phone].    We comply with applicable federal civil rights laws and [place] laws. We do not discriminate on the basis of race, color, national origin, age, disability sex, sexual orientation or gender identity.               Review of your medicines      START taking        Dose / Directions    oxyCODONE-acetaminophen 5-325 MG per tablet   Commonly known as:  PERCOCET   Used for:  Malignant melanoma of left upper extremity including shoulder (H)        Dose:  1-2 tablet   Take 1-2 tablets by mouth every 4 hours as needed for pain (moderate to severe)   Quantity:  10 tablet   Refills:  0       senna-docusate 8.6-50 MG per tablet   Commonly known as:  SENOKOT-S;PERICOLACE   Used for:  Malignant melanoma of left upper extremity including shoulder (H)        Dose:  1-2 tablet   Take 1-2 tablets by mouth 2 times daily Take while on oral narcotics to prevent or treat constipation.   Quantity:  30 tablet   Refills:  0         CONTINUE these medicines which have  NOT CHANGED        Dose / Directions    atorvastatin 40 MG tablet   Commonly known as:  LIPITOR   Used for:  Mixed hyperlipidemia        Dose:  40 mg   Take 1 tablet (40 mg) by mouth daily   Quantity:  90 tablet   Refills:  1       fish oil-omega-3 fatty acids 1000 MG capsule        Dose:  1 g   Take 1 capsule by mouth daily.   Quantity:  90 capsule   Refills:  3       fluticasone 50 MCG/ACT spray   Commonly known as:  FLONASE   Used for:  Post-nasal drainage        Dose:  1-2 spray   Spray 1-2 sprays into both nostrils daily   Quantity:  16 g   Refills:  2       Melatonin 10 MG Tabs tablet        Dose:  10 mg   Take 10 mg by mouth At Bedtime   Refills:  0       mometasone 0.1 % solution (lotion)   Commonly known as:  ELOCON   Used for:  Dermatitis, seborrheic        Nightly to scalp rash as needed.   Quantity:  60 mL   Refills:  5       omeprazole 40 MG capsule   Commonly known as:  priLOSEC   Used for:  Gastroesophageal reflux disease, esophagitis presence not specified        Dose:  40 mg   Take 1 capsule (40 mg) by mouth daily Take 30-60 minutes before a meal.   Quantity:  30 capsule   Refills:  1       ONE-A-DAY ENERGY Tabs        Take  by mouth.   Refills:  0            Where to get your medicines      These medications were sent to [address] [address]    Hours:  TRANSPLANT PHONE NUMBER [phone] Phone:  [phone]     senna-docusate 8.6-50 MG per tablet         Some of these will need a paper prescription and others can be bought over the counter. Ask your nurse if you have questions.     Bring a paper prescription for each of these medications     oxyCODONE-acetaminophen 5-325 MG per tablet                Protect others around you: Learn how to safely use, store and throw away your medicines at www.disposemymeds.org.             Medication List: This is a list of all your medications and when  to take them. Check marks below indicate your daily home schedule. Keep this list as a reference.      Medications           Morning Afternoon Evening Bedtime As Needed    atorvastatin 40 MG tablet   Commonly known as:  LIPITOR   Take 1 tablet (40 mg) by mouth daily                                fish oil-omega-3 fatty acids 1000 MG capsule   Take 1 capsule by mouth daily.                                fluticasone 50 MCG/ACT spray   Commonly known as:  FLONASE   Spray 1-2 sprays into both nostrils daily                                Melatonin 10 MG Tabs tablet   Take 10 mg by mouth At Bedtime                                mometasone 0.1 % solution (lotion)   Commonly known as:  ELOCON   Nightly to scalp rash as needed.                                omeprazole 40 MG capsule   Commonly known as:  priLOSEC   Take 1 capsule (40 mg) by mouth daily Take 30-60 minutes before a meal.                                ONE-A-DAY ENERGY Tabs   Take  by mouth.                                oxyCODONE-acetaminophen 5-325 MG per tablet   Commonly known as:  PERCOCET   Take 1-2 tablets by mouth every 4 hours as needed for pain (moderate to severe)                                senna-docusate 8.6-50 MG per tablet   Commonly known as:  SENOKOT-S;PERICOLACE   Take 1-2 tablets by mouth 2 times daily Take while on oral narcotics to prevent or treat constipation.

## 2017-07-18 NOTE — ANESTHESIA CARE TRANSFER NOTE
Patient: Ronald Ingram    Procedure(s):  Wide Local Excision of Left Arm Melanoma and [place] Lymph Node Biopsy - Wound Class: I-Clean    Diagnosis: Left Arm Melanoma  Diagnosis Additional Information: No value filed.    Anesthesia Type:   General, LMA     Note:  Airway :Face Mask  Patient transferred to:PACU  Comments: 120/83  99%  98.6f -82- 12      Vitals: (Last set prior to Anesthesia Care Transfer)    CRNA VITALS  7/18/2017 1322 - 7/18/2017 1357      7/18/2017             Pulse: 87    SpO2: 94 %    Resp Rate (set): 10                Electronically Signed By: YISEL Bustos CRNA  July 18, 2017  1:57 PM

## 2017-07-18 NOTE — ANESTHESIA PREPROCEDURE EVALUATION
Anesthesia Evaluation     . Pt has had prior anesthetic. Type: General    No history of anesthetic complications          ROS/MED HX    ENT/Pulmonary:  - neg pulmonary ROS     Neurologic:  - neg neurologic ROS     Cardiovascular:     (+) Dyslipidemia, ----. : . . . :. . Previous cardiac testing date:results:date: results:ECG reviewed date:07/13/2017 results:Sinus rhythm. Within normal limits. date: results:          METS/Exercise Tolerance:  >4 METS   Hematologic:  - neg hematologic  ROS       Musculoskeletal:   (+) arthritis, , , -       GI/Hepatic:     (+) GERD Asymptomatic on medication,       Renal/Genitourinary:  - ROS Renal section negative       Endo:     (+) Obesity, .      Psychiatric:  - neg psychiatric ROS       Infectious Disease:  - neg infectious disease ROS       Malignancy:   (+) Malignancy History of Skin  Skin CA Active status post,         Other:    - neg other ROS                 Physical Exam  Normal systems: pulmonary and dental    Airway   Mallampati: I  TM distance: >3 FB  Neck ROM: full    Dental     Cardiovascular   Rhythm and rate: regular and normal      Pulmonary                        Lab / Radiology Results:   Reviewed current labs when avail, see EMR for details.      BMP:  Recent Labs   Lab Test  10/19/16   1101   NA  138   POTASSIUM  4.2   CHLORIDE  106   CO2  27   BUN  19   CR  1.10   GLC  95   ENRIQUE  8.8       LFTs:   Recent Labs   Lab Test  10/19/16   1101   PROTTOTAL  7.8   ALBUMIN  3.8   BILITOTAL  0.7   ALKPHOS  69   AST  22   ALT  40       CBC:   Recent Labs   Lab Test  11/25/15   0929   WBC  6.1   HGB  15.7   PLT  173       Coags:  No results for input(s): INR, PTT, FIBR in the last 66941 hours.    Blood Bank:  No results found for: ABO, RH, AS    Studies:  See EMR for current studies, reviewed when available.      Anesthesia Plan      History & Physical Review  History and physical reviewed and following examination; no interval change.    ASA Status:  2 .    NPO Status:  >  8 hours    Plan for General and LMA with Intravenous induction. Maintenance will be Balanced.    PONV prophylaxis:  Ondansetron (or other 5HT-3) and Dexamethasone or Solumedrol       Postoperative Care  Postoperative pain management:  Multi-modal analgesia.      Consents  Anesthetic plan, risks, benefits and alternatives discussed with:  Patient.  Use of blood products discussed: No .   .      Tello Stroud MD  Anesthesiologist  11:02 AM  July 18, 2017                        .

## 2017-07-18 NOTE — OR NURSING
"Patient denied pain during phase 1 recovery.  Started complaining of \"chest pain\" in his L armpit at the surgical site.  Also complained of chest tightness, \"like a rosana is sitting on my chest.\"  Dr. Stroud came to assess patient.  12-lead EKG taken. Dr. Stroud assessed ekg.  Vital signs stable.  No c/o radiating pain or trouble breathing.  Patient burped twice and his chest tightness improved.  Dr. Stroud notified and assessed patient.  Given percocet 1 tab.  Patient got dressed and reported continued improvement of chest tightness.  Dr. Stroud notified.  Dr. Stroud confirmed that it was okay to discharge patient.    "

## 2017-07-24 ENCOUNTER — TELEPHONE (OUTPATIENT)
Dept: ONCOLOGY | Facility: CLINIC | Age: 57
End: 2017-07-24

## 2017-07-24 LAB — COPATH REPORT: NORMAL

## 2017-07-24 NOTE — TELEPHONE ENCOUNTER
Call to pt's wife, Latanya with pathology results; no residual cancer in left shoulder melanoma re-excision and negative sentinel lymph node. Pt has post op with Dr Hoyos week of 7/24.

## 2017-07-28 ENCOUNTER — OFFICE VISIT (OUTPATIENT)
Dept: ONCOLOGY | Facility: CLINIC | Age: 57
End: 2017-07-28
Attending: SURGERY
Payer: COMMERCIAL

## 2017-07-28 VITALS
SYSTOLIC BLOOD PRESSURE: 119 MMHG | OXYGEN SATURATION: 95 % | BODY MASS INDEX: 32.44 KG/M2 | HEIGHT: 75 IN | TEMPERATURE: 98.3 F | WEIGHT: 260.9 LBS | RESPIRATION RATE: 16 BRPM | HEART RATE: 74 BPM | DIASTOLIC BLOOD PRESSURE: 73 MMHG

## 2017-07-28 DIAGNOSIS — C43.62 MELANOMA OF LEFT UPPER ARM (H): Primary | ICD-10-CM

## 2017-07-28 PROCEDURE — 99212 OFFICE O/P EST SF 10 MIN: CPT | Mod: ZF

## 2017-07-28 ASSESSMENT — PAIN SCALES - GENERAL: PAINLEVEL: NO PAIN (0)

## 2017-07-28 NOTE — PROGRESS NOTES
Postop visit after wle + SLN biopsy for intermediate thickness melanoma.  Path: no residual melanoma; sln NEG.    Incisions healing well  IMP: postop visit  Plan: fu with derm.

## 2017-07-28 NOTE — NURSING NOTE
"Oncology Rooming Note    July 28, 2017 9:56 AM   Ronald Ingram is a 56 year old male who presents for:    Chief Complaint   Patient presents with     Oncology Clinic Visit     post op      Initial Vitals: Pulse 74  Temp 98.3  F (36.8  C) (Oral)  Resp 16  Ht 1.897 m (6' 2.69\")  Wt 118.3 kg (260 lb 14.4 oz)  SpO2 95%  BMI 32.89 kg/m2 Estimated body mass index is 32.89 kg/(m^2) as calculated from the following:    Height as of this encounter: 1.897 m (6' 2.69\").    Weight as of this encounter: 118.3 kg (260 lb 14.4 oz). Body surface area is 2.5 meters squared.  No Pain (0) Comment: Data Unavailable   No LMP for male patient.  Allergies reviewed: Yes  Medications reviewed: Yes    Medications: Medication refills not needed today.  Pharmacy name entered into [employer]cr[employer]: [id number] - [place], [place] - [address]    Clinical concerns: no doc was NOT notified.    5 minutes for nursing intake (face to face time)     Kati Mcleod CMA              "

## 2017-07-28 NOTE — MR AVS SNAPSHOT
"              After Visit Summary   7/28/2017    Ronald Ingram    MRN: [id number]           Patient Information     Date Of Birth          1960        Visit Information        Provider Department      7/28/2017 9:45 AM Lele Hoyos MD [facility]        Today's Diagnoses     Melanoma of left upper arm (H)    -  1       Follow-ups after your visit        Who to contact     If you have questions or need follow up information about today's clinic visit or your schedule please contact [facility] directly at [phone].  Normal or non-critical lab and imaging results will be communicated to you by [employer] Foundation[employer], letter or phone within 4 business days after the clinic has received the results. If you do not hear from us within 7 days, please contact the clinic through Y-Client[employer] or phone. If you have a critical or abnormal lab result, we will notify you by phone as soon as possible.  Submit refill requests through [employer] or call your pharmacy and they will forward the refill request to us. Please allow 3 business days for your refill to be completed.          Additional Information About Your Visit        MyChart Information     [employer] gives you secure access to your electronic health record. If you see a primary care provider, you can also send messages to your care team and make appointments. If you have questions, please call your primary care clinic.  If you do not have a primary care provider, please call [phone] and they will assist you.        Care EveryWhere ID     This is your Care EveryWhere ID. This could be used by other organizations to access your [facility] medical records  [id number]        Your Vitals Were     Pulse Temperature Respirations Height Pulse Oximetry BMI (Body Mass Index)    74 98.3  F (36.8  C) (Oral) 16 1.897 m (6' 2.69\") 95% 32.89 kg/m2       Blood Pressure from Last 3 Encounters:   07/28/17 119/73   07/18/17 120/75   07/13/17 118/68    Weight from Last 3 " Encounters:   07/28/17 118.3 kg (260 lb 14.4 oz)   07/18/17 118.8 kg (262 lb)   07/13/17 119.1 kg (262 lb 9.6 oz)              Today, you had the following     No orders found for display       Primary Care Provider Office Phone # Fax #    Karen Kal Robbins [phone]       [facility] [address] [facility] [address]        Equal Access to Services     JONATHAN BIANCHI : Hadii aad ku hadasho Soomaali, waaxda luqadaha, qaybta kaalmada adeegyada, waxay idiin hayaan adeeg kharash lacedric . So [facility] [phone].    ATENCIÓN: Si habla español, tiene a song disposición servicios gratuitos de asistencia lingüística. LlBl[facility] Valley [facility] System [phone].    We comply with applicable federal civil rights laws and [place] laws. We do not discriminate on the basis of race, color, national origin, age, disability sex, sexual orientation or gender identity.            Thank you!     Thank you for choosing [facility]  for your care. Our goal is always to provide you with excellent care. Hearing back from our patients is one way we can continue to improve our services. Please take a few minutes to complete the written survey that you may receive in the mail after your visit with us. Thank you!             Your Updated Medication List - Protect others around you: Learn how to safely use, store and throw away your medicines at www.disposemymeds.org.          This list is accurate as of: 7/28/17 11:59 PM.  Always use your most recent med list.                   Brand Name Dispense Instructions for use Diagnosis    atorvastatin 40 MG tablet    LIPITOR    90 tablet    Take 1 tablet (40 mg) by mouth daily    Mixed hyperlipidemia       fish oil-omega-3 fatty acids 1000 MG capsule     90 capsule    Take 1 capsule by mouth daily.        fluticasone 50 MCG/ACT spray    FLONASE    16 g    Spray 1-2 sprays into both nostrils daily    Post-nasal drainage       Melatonin 10 MG Tabs tablet      Take 10 mg by  mouth At Bedtime        mometasone 0.1 % solution (lotion)    ELOCON    60 mL    Nightly to scalp rash as needed.    Dermatitis, seborrheic       omeprazole 40 MG capsule    priLOSEC    30 capsule    Take 1 capsule (40 mg) by mouth daily Take 30-60 minutes before a meal.    Gastroesophageal reflux disease, esophagitis presence not specified       ONE-A-DAY ENERGY Tabs      Take  by mouth.        oxyCODONE-acetaminophen 5-325 MG per tablet    PERCOCET    10 tablet    Take 1-2 tablets by mouth every 4 hours as needed for pain (moderate to severe)    Malignant melanoma of left upper extremity including shoulder (H)       senna-docusate 8.6-50 MG per tablet    SENOKOT-S;PERICOLACE    30 tablet    Take 1-2 tablets by mouth 2 times daily Take while on oral narcotics to prevent or treat constipation.    Malignant melanoma of left upper extremity including shoulder (H)

## 2017-07-28 NOTE — LETTER
7/28/2017       RE: Ronald Ingram  [address] [facility] [address]     Dear Colleague,    Thank you for referring your patient, Ronald Ingram, to the [facility] at [facility]. Please see a copy of my visit note below.    Postop visit after wle + SLN biopsy for intermediate thickness melanoma.  Path: no residual melanoma; sln NEG.    Incisions healing well  IMP: postop visit  Plan: fu with derm.    Again, thank you for allowing me to participate in the care of your patient.      Sincerely,    Lele Hoyos MD

## 2017-11-15 ENCOUNTER — TELEPHONE (OUTPATIENT)
Dept: DERMATOLOGY | Facility: CLINIC | Age: 57
End: 2017-11-15

## 2017-11-15 NOTE — TELEPHONE ENCOUNTER
Patient's wife called to get patient in for his 3 Month follow up after surgery (7/18/17) but the next available is 2/6/18.  I have scheduled this appointment for 2/6/18 at 2:45 but Wife wanted me to ask Dr. Jaquez if okay that this will be at 6 Months vs the 3 Months follow up that patient should have.  Please call back to either see if can get in before the 2/6/18 appointment or okay to keep that appointment.  Okay to leave a detailed message.    Thank you,    Bri Greene

## 2017-11-16 NOTE — TELEPHONE ENCOUNTER
This patient needs to be seen in 3 months. In general, return patients should be scheduled in OLAYINK[facility] slots for whenever they're due back per the last notes. They should ideally schedule follow-up at the time of previous visit, but unfortunately this doesn't always happen.

## 2017-11-28 ENCOUNTER — OFFICE VISIT (OUTPATIENT)
Dept: DERMATOLOGY | Facility: CLINIC | Age: 57
End: 2017-11-28
Payer: COMMERCIAL

## 2017-11-28 DIAGNOSIS — L82.1 SK (SEBORRHEIC KERATOSIS): ICD-10-CM

## 2017-11-28 DIAGNOSIS — L57.0 AK (ACTINIC KERATOSIS): Primary | ICD-10-CM

## 2017-11-28 DIAGNOSIS — C43.62 MELANOMA OF LEFT UPPER ARM (H): ICD-10-CM

## 2017-11-28 PROCEDURE — 17000 DESTRUCT PREMALG LESION: CPT | Performed by: DERMATOLOGY

## 2017-11-28 PROCEDURE — 17003 DESTRUCT PREMALG LES 2-14: CPT | Performed by: DERMATOLOGY

## 2017-11-28 PROCEDURE — 99214 OFFICE O/P EST MOD 30 MIN: CPT | Mod: 25 | Performed by: DERMATOLOGY

## 2017-11-28 NOTE — MR AVS SNAPSHOT
After Visit Summary   11/28/2017    Ronald Ingram    MRN: [id number]           Patient Information     Date Of Birth          1960        Visit Information        Provider Department      11/28/2017 2:15 PM Rosa Jaquez MD [facility]        Today's Diagnoses     AK (actinic keratosis)    -  1    Melanoma of left upper arm (H)        SK (seborrheic keratosis)          Care Instructions                    Pediatric Dermatology  [facility]  [address]  1st [place]  [address]  Phone: [phone]    Pediatric & Adult Dermatology  [facility]  [address]  2nd Floor  [facility] [address]  [phone]                  General information: Dr. Rosa Jaquez is a board-certified dermatologist with subspecialty certification in pediatric dermatology.     Scheduling and Nurse Triage: Dr. Jaquez sees pediatric patients on Mondays in [place] and adult and pediatric patients on Tuesdays in [place]. The remainder of the week she practices at the [facility]. Please call the above phone numbers to schedule or to talk to a nurse.     -For scheduling at the [place] or [place] locations, or to talk to the triage nurse please call the above phone number at the clinic where you were seen.     -For medication refills, please call your pharmacy.                     Follow-ups after your visit        Your next 10 appointments already scheduled     Feb 06, 2018  2:45 PM CST   [facility] with [facility]   [facility]    [address] [place] [address]  [address]  [facility] [address] [phone]              Who to contact     If you have questions or need follow up information about today's clinic visit or your schedule please contact [facility] directly at [phone].  Normal or  non-critical lab and imaging results will be communicated to you by MyChart, letter or phone within 4 business days after the clinic has received the results. If you do not hear from us within 7 days, please contact the clinic through CloudOnt or phone. If you have a critical or abnormal lab result, we will notify you by phone as soon as possible.  Submit refill requests through [employer] or call your pharmacy and they will forward the refill request to us. Please allow 3 business days for your refill to be completed.          Additional Information About Your Visit        [employer] Information     [employer] gives you secure access to your electronic health record. If you see a primary care provider, you can also send messages to your care team and make appointments. If you have questions, please call your primary care clinic.  If you do not have a primary care provider, please call [phone] and they will assist you.        Care EveryWhere ID     This is your Care EveryWhere ID. This could be used by other organizations to access your [facility] medical records  [id number]         Blood Pressure from Last 3 Encounters:   07/28/17 119/73   07/18/17 120/75   07/13/17 118/68    Weight from Last 3 Encounters:   07/28/17 260 lb 14.4 oz (118.3 kg)   07/18/17 262 lb (118.8 kg)   07/13/17 262 lb 9.6 oz (119.1 kg)              We Performed the Following     DESTRUCT PREMALIGNANT LESION, 2-14     DESTRUCT PREMALIGNANT LESION, FIRST        Primary Care Provider Office Phone # Fax #    Karen Kal Robbins [phone] [address] [address] [address]        Equal Access to Services     [facility]: Hadii aad ku hadasho Soomaali, waaxda luqadaha, qaybta kaalmada jeff, blair davalos. So [facility] [phone].    ATENCIÓN: Si habla español, tiene a song disposición servicios gratuitos de asistencia lingüística. Llame al [phone].    We comply with applicable federal civil  rights laws and [place] laws. We do not discriminate on the basis of race, color, national origin, age, disability, sex, sexual orientation, or gender identity.            Thank you!     Thank you for choosing [facility]  for your care. Our goal is always to provide you with excellent care. Hearing back from our patients is one way we can continue to improve our services. Please take a few minutes to complete the written survey that you may receive in the mail after your visit with us. Thank you!             Your Updated Medication List - Protect others around you: Learn how to safely use, store and throw away your medicines at www.disposemymeds.org.          This list is accurate as of: 11/28/17 11:59 PM.  Always use your most recent med list.                   Brand Name Dispense Instructions for use Diagnosis    atorvastatin 40 MG tablet    LIPITOR    90 tablet    Take 1 tablet (40 mg) by mouth daily    Mixed hyperlipidemia       fish oil-omega-3 fatty acids 1000 MG capsule     90 capsule    Take 1 capsule by mouth daily.        fluticasone 50 MCG/ACT spray    FLONASE    16 g    Spray 1-2 sprays into both nostrils daily    Post-nasal drainage       Melatonin 10 MG Tabs tablet      Take 10 mg by mouth At Bedtime        mometasone 0.1 % solution (lotion)    ELOCON    60 mL    Nightly to scalp rash as needed.    Dermatitis, seborrheic       omeprazole 40 MG capsule    priLOSEC    30 capsule    Take 1 capsule (40 mg) by mouth daily Take 30-60 minutes before a meal.    Gastroesophageal reflux disease, esophagitis presence not specified       ONE-A-DAY ENERGY Tabs      Take  by mouth.        oxyCODONE-acetaminophen 5-325 MG per tablet    PERCOCET    10 tablet    Take 1-2 tablets by mouth every 4 hours as needed for pain (moderate to severe)    Malignant melanoma of left upper extremity including shoulder (H)       senna-docusate 8.6-50 MG per tablet    SENOKOT-S;PERICOLACE    30 tablet    Take 1-2 tablets  by mouth 2 times daily Take while on oral narcotics to prevent or treat constipation.    Malignant melanoma of left upper extremity including shoulder (H)

## 2017-11-28 NOTE — LETTER
11/28/2017      RE: Ronald Ingram  [address] [facility] [address]               Dermatology Clinic Note    Dermatology Problem List:  1. Melanoma, L upper arm Breslow at least 2.6 mm, >1 mitosis, + ulceration. Clayton node negative. Diagnosis 6/6/17  2. Benign pigmented nevi   3. Monitoring papule on the nasal tip- rosacea less likely basal cell carcinoma   4. Aks   5. Seborrheic keratoses    CC: Patient presents with:  RECHECK: 3 month f/u  doing well, current concerns what to look for in future and is he more prone to skin cancer in the future     HPI: Ronald Ingram is a 57 year old male presenting for skin evaluation following melanoma diagnosis in June. No new lesions of concern. Notes no bleeding or painful areas. Has scaling bumps on the back that itch. No swollen lymph nodes per his report.       Patient Active Problem List   Diagnosis     Mixed hyperlipidemia     Labral tear of shoulder- left; work related     CARDIOVASCULAR SCREENING; LDL GOAL LESS THAN 160     Pain in joint involving ankle and foot     Ankle joint stiffness     Right shoulder pain     Grief     AK (actinic keratosis)     Solar lentigo     Seborrheic keratosis     Melanoma of left upper arm (H)       Allergies   Allergen Reactions     Dust Mites      Nasal cavity tingles          Current Outpatient Prescriptions   Medication     oxyCODONE-acetaminophen (PERCOCET) 5-325 MG per tablet     senna-docusate (SENOKOT-S;PERICOLACE) 8.6-50 MG per tablet     Melatonin 10 MG TABS tablet     mometasone (ELOCON) 0.1 % lotion     fluticasone (FLONASE) 50 MCG/ACT nasal spray     omeprazole (PRILOSEC) 40 MG capsule     atorvastatin (LIPITOR) 40 MG tablet     fish oil-omega-3 fatty acids (OMEGA 3) 1000 MG capsule     Multiple Vitamins-Minerals (ONE-A-DAY ENERGY) TABS     No current facility-administered medications for this visit.        Family History   Problem Relation Age of Onset     Cardiovascular Father      DIABETES Father      HEART  DISEASE Father      Alcohol/Drug Brother      Alcohol/Drug Brother        Social History     Social History     Marital status:      Spouse name: N/A     Number of children: N/A     Years of education: N/A     Occupational History     Not on file.     Social History Main Topics     Smoking status: Never Smoker     Smokeless tobacco: Never Used     Alcohol use Yes      Comment: rarely     Drug use: No     Sexual activity: Yes     Partners: Female     Birth control/ protection: Surgical      Comment: wife had tubes tied     Other Topics Concern     Parent/Sibling W/ Cabg, Mi Or Angioplasty Before 65f 55m? Yes     Social History Narrative         ROS: Feeling well without other skin concerns.     EXAM:  There were no vitals taken for this visit.  GEN: Alert, no distress  HEENT: Conjunctiva clear.   PULM: Breathing comfortably on RA  CV: Extrem warm and well perfused  ABD: No distension  SKIN: Exam of the scalp, face, neck, chest, abdomen, back, arms, legs, hands, feet, buttocks. Normal except as follows:    --Waxy tan papules on the back  --Scattered light brown macules on the shoulders, upper back, chest, arms  --Healed linear scar on the L posterior shoulder without pigmentation  --Scattered 3-4 mm medium brown macules on the arms, legs, back, feet with reticular pigment  --Slightly raised papule on the L nasal tip, not friable, no telangiectasias (photo)  --No lymphad in the axillae, neck, inguinal creases  --Gritty papules on the R preauricular cheek x4      Assessment and Plan:  1. Seborrheic keratoses: Benign hyperkeratotic papules and plaques. No treatment advised unless irritation occurs.      2. Benign pigmented nevi: No lesions of concern. Sun protection recommended. Discussed ABCDEs of malignant melanoma.     3. History of malignant melanoma. No signs of local recurrence. No lymphadenopathy. Will need skin checks q3 months until June 2019.     4. AKs: Treated 4 lesions with liquid nitrogen today.  Wound info discussed.       Thank you for involving me in this patient's care.     Rosa Jaquez MD  Dermatology Staff    CC:     Karen Robbins MD

## 2017-11-28 NOTE — PATIENT INSTRUCTIONS
Pediatric Dermatology  [facility]  [address]  1st [place]  [address]  Phone: [phone]    Pediatric & Adult Dermatology  [facility]  [address] [facility]   2nd Floor  [facility] [address]  [phone]                  General information: Dr. Rosa Jaquez is a board-certified dermatologist with subspecialty certification in pediatric dermatology.     Scheduling and Nurse Triage: Dr. Jaquez sees pediatric patients on Mondays in [place] and adult and pediatric patients on Tuesdays in [place]. The remainder of the week she practices at the [facility]. Please call the above phone numbers to schedule or to talk to a nurse.     -For scheduling at the [place] or [place] locations, or to talk to the triage nurse please call the above phone number at the clinic where you were seen.     -For medication refills, please call your pharmacy.

## 2017-11-29 PROBLEM — L82.1 SK (SEBORRHEIC KERATOSIS): Status: ACTIVE | Noted: 2017-11-29

## 2017-11-29 NOTE — PROGRESS NOTES
Dermatology Clinic Note    Dermatology Problem List:  1. Melanoma, L upper arm B[place] at least 2.6 mm, >1 mitosis, + ulceration. [place] node negative. Diagnosis 6/6/17  2. Benign pigmented nevi   3. Monitoring papule on the nasal tip- rosacea less likely basal cell carcinoma   4. Aks   5. Seborrheic keratoses    CC: Patient presents with:  RECHECK: 3 month f/u  doing well, current concerns what to look for in future and is he more prone to skin cancer in the future     HPI: Ronald Ingram is a 57 year old male presenting for skin evaluation following melanoma diagnosis in June. No new lesions of concern. Notes no bleeding or painful areas. Has scaling bumps on the back that itch. No swollen lymph nodes per his report.       Patient Active Problem List   Diagnosis     Mixed hyperlipidemia     Labral tear of shoulder- left; work related     CARDIOVASCULAR SCREENING; LDL GOAL LESS THAN 160     Pain in joint involving ankle and foot     Ankle joint stiffness     Right shoulder pain     Grief     AK (actinic keratosis)     Solar lentigo     Seborrheic keratosis     Melanoma of left upper arm (H)       Allergies   Allergen Reactions     Dust Mites      Nasal cavity tingles          Current Outpatient Prescriptions   Medication     oxyCODONE-acetaminophen (PERCOCET) 5-325 MG per tablet     senna-docusate (SENOKOT-S;PERICOLACE) 8.6-50 MG per tablet     Melatonin 10 MG TABS tablet     mometasone (ELOCON) 0.1 % lotion     fluticasone (FLONASE) 50 MCG/ACT nasal spray     omeprazole (PRILOSEC) 40 MG capsule     atorvastatin (LIPITOR) 40 MG tablet     fish oil-omega-3 fatty acids (OMEGA 3) 1000 MG capsule     Multiple Vitamins-Minerals (ONE-A-DAY ENERGY) TABS     No current facility-administered medications for this visit.        Family History   Problem Relation Age of Onset     Cardiovascular Father      DIABETES Father      HEART DISEASE Father      Alcohol/Drug Brother      Alcohol/Drug Brother        Social History      Social History     Marital status:      Spouse name: N/A     Number of children: N/A     Years of education: N/A     Occupational History     Not on file.     Social History Main Topics     Smoking status: Never Smoker     Smokeless tobacco: Never Used     Alcohol use Yes      Comment: rarely     Drug use: No     Sexual activity: Yes     Partners: Female     Birth control/ protection: Surgical      Comment: wife had tubes tied     Other Topics Concern     Parent/Sibling W/ Cabg, Mi Or Angioplasty Before 65f 55m? Yes     Social History Narrative         ROS: Feeling well without other skin concerns.     EXAM:  There were no vitals taken for this visit.  GEN: Alert, no distress  HEENT: Conjunctiva clear.   PULM: Breathing comfortably on RA  CV: Extrem warm and well perfused  ABD: No distension  SKIN: Exam of the scalp, face, neck, chest, abdomen, back, arms, legs, hands, feet, buttocks. Normal except as follows:    --Waxy tan papules on the back  --Scattered light brown macules on the shoulders, upper back, chest, arms  --Healed linear scar on the L posterior shoulder without pigmentation  --Scattered 3-4 mm medium brown macules on the arms, legs, back, feet with reticular pigment  --Slightly raised papule on the L nasal tip, not friable, no telangiectasias (photo)  --No lymphad in the axillae, neck, inguinal creases  --Gritty papules on the R preauricular cheek x4      Assessment and Plan:  1. Seborrheic keratoses: Benign hyperkeratotic papules and plaques. No treatment advised unless irritation occurs.      2. Benign pigmented nevi: No lesions of concern. Sun protection recommended. Discussed ABCDEs of malignant melanoma.     3. History of malignant melanoma. No signs of local recurrence. No lymphadenopathy. Will need skin checks q3 months until June 2019.     4. AKs: Treated 4 lesions with liquid nitrogen today. Wound info discussed.       Thank you for involving me in this patient's care.     Rosa  MD Leonid  Dermatology Staff    CC:     Karen Robbins MD

## 2018-01-24 ENCOUNTER — OFFICE VISIT (OUTPATIENT)
Dept: FAMILY MEDICINE | Facility: CLINIC | Age: 58
End: 2018-01-24
Payer: COMMERCIAL

## 2018-01-24 VITALS
OXYGEN SATURATION: 95 % | RESPIRATION RATE: 16 BRPM | TEMPERATURE: 98 F | WEIGHT: 259.9 LBS | HEART RATE: 83 BPM | BODY MASS INDEX: 33.36 KG/M2 | HEIGHT: 74 IN | DIASTOLIC BLOOD PRESSURE: 76 MMHG | SYSTOLIC BLOOD PRESSURE: 112 MMHG

## 2018-01-24 DIAGNOSIS — Z00.00 ROUTINE HISTORY AND PHYSICAL EXAMINATION OF ADULT: Primary | ICD-10-CM

## 2018-01-24 DIAGNOSIS — R09.82 POST-NASAL DRAINAGE: ICD-10-CM

## 2018-01-24 DIAGNOSIS — C43.62 MELANOMA OF LEFT UPPER ARM (H): ICD-10-CM

## 2018-01-24 DIAGNOSIS — M79.672 PAIN IN BOTH FEET: ICD-10-CM

## 2018-01-24 DIAGNOSIS — R07.89 OTHER CHEST PAIN: ICD-10-CM

## 2018-01-24 DIAGNOSIS — Z11.59 ENCOUNTER FOR HEPATITIS C SCREENING TEST FOR LOW RISK PATIENT: ICD-10-CM

## 2018-01-24 DIAGNOSIS — M79.671 PAIN IN BOTH FEET: ICD-10-CM

## 2018-01-24 PROCEDURE — 99213 OFFICE O/P EST LOW 20 MIN: CPT | Mod: 25 | Performed by: INTERNAL MEDICINE

## 2018-01-24 PROCEDURE — 99396 PREV VISIT EST AGE 40-64: CPT | Performed by: INTERNAL MEDICINE

## 2018-01-24 RX ORDER — FLUTICASONE PROPIONATE 50 MCG
1-2 SPRAY, SUSPENSION (ML) NASAL DAILY
Qty: 16 G | Refills: 2 | Status: SHIPPED | OUTPATIENT
Start: 2018-01-24 | End: 2020-09-28

## 2018-01-24 NOTE — PROGRESS NOTES
"SUBJECTIVE:   CC: Ronald Ingram is an 57 year old male who presents for preventative health visit.       Physical   Annual:     Getting at least 3 servings of Calcium per day::  NO    Bi-annual eye exam::  NO    Dental care twice a year::  Yes    Sleep apnea or symptoms of sleep apnea::  None    Diet::  Regular (no restrictions)    Frequency of exercise::  2-3 days/week    Duration of exercise::  30-45 minutes    Taking medications regularly::  Yes    Medication side effects::  None    Additional concerns today::  No            Headache: Patient reports that for about the last month he has been experiencing headaches that originate at the base of his skull. He describes the headaches as \"light\". Patient states that his symptoms are alleviated with self-massaging or pressing on the base of his skull.     Chest Pain: Patient reports that recently he has been experiencing intermittent \"gas buildup\" followed by chest pain. He states that he will then burp a couple times and the pain is relieved. In addition, patient states that he was out shoveling yesterday where he began to experience chest pain. Patient reports that he then stopped shoveling and the chest pain resolved. With this he continued to shovel for three more hours without any chest pain. Patient expresses concern regarding this as he notes that the majority of males in his family have  of a heart attack     Left Foot Pain: Patient states that he experience a burning sensation in his left dorsal foot primarily at night time. He states that the pain is alleviated if he consistently rubs the top of his foot. Patient notes that he may have a history of fallen arches. He denies walking barefoot. Patient also notes that at night he experiences a jabbing pain in the plantar surface of his left foot. He states that occasionally he feels it in his right foot, but primarily left.     Shortness of Breath: Patient reports that a couple days ago he was completing " simple household chores where he wasn't able to catch his breath. He states that he sat down and practiced deep breathing multiple times but still could seem to fully catch his breath.       Today's PHQ-2 Score:   PHQ-2 ( 1999 Pf[employer]) 1/24/2018   Q1: Little interest or pleasure in doing things 0   Q2: Feeling down, depressed or hopeless 0   PHQ-2 Score 0   Q1: Little interest or pleasure in doing things Not at all   Q2: Feeling down, depressed or hopeless Not at all   PHQ-2 Score 0     Abuse: Current or Past(Physical, Sexual or Emotional)- No  Do you feel safe in your environment - Yes    Social History   Substance Use Topics     Smoking status: Never Smoker     Smokeless tobacco: Never Used     Alcohol use Yes      Comment: rarely     Alcohol Use 1/24/2018   If you drink alcohol, do you typically have greater than 3 drinks per day OR greater than 7 drinks per week?   No       Last PSA:   PSA   Date Value Ref Range Status   02/15/2012 0.37 0 - 4 ug/L Final       Reviewed orders with patient. Reviewed health maintenance and updated orders accordingly - Yes  Labs reviewed in EPIC    Reviewed and updated as needed this visit by clinical staff  Tobacco  Allergies  Meds  Med Hx  Surg Hx  Fam Hx  Soc Hx        Reviewed and updated as needed this visit by Provider        Past Medical History:   Diagnosis Date     Hyperlipidemia       Past Surgical History:   Procedure Laterality Date     APPENDECTOMY      age 16     BIOPSY NODE SENTINEL Left 7/18/2017    Procedure: BIOPSY NODE SENTINEL;  Wide Local Excision of Left Arm Melanoma and [place] Lymph Node Biopsy;  Surgeon: Lele Hoyos MD;  Location:  OR     ORTHOPEDIC SURGERY      left ankle       Review of Systems  CONSTITUTIONAL: NEGATIVE for fever, chills, change in weight  INTEGUMENTARY/SKIN: Hx of seborrheic dermatitis - use of elocon lotion; Hx of melanoma of left deltoid, surgically removed (7/18/17); NEGATIVE for worrisome rashes or lesions  EYES: NEGATIVE  "for vision changes or irritation  ENT: Hx of post-nasal drainage - use of flonase; NEGATIVE for ear, mouth and throat problems  RESP: POSITIVE for SOB; NEGATIVE for significant cough  CV: POSITIVE for chest pain; Strong family history of heart disease; NEGATIVE for palpitations or peripheral edema  GI: Obesity, BMI ~33; NEGATIVE for nausea, abdominal pain, heartburn, or change in bowel habits   male: NEGATIVE for dysuria, hematuria, erectile dysfunction, decreased urinary stream and incontinence  MUSCULOSKELETAL: POSITIVE for left foot pain; NEGATIVE for significant arthralgias or myalgia  NEURO: POSITIVE for headaches; NEGATIVE for weakness, dizziness or paresthesias  ENDOCRINE: NEGATIVE for temperature intolerance, skin/hair changes  HEME/ALLERGY/IMMUNE: NEGATIVE for bleeding problems  PSYCHIATRIC: Hx of sleep disturbance - use of melatonin; NEGATIVE for changes in mood or affect    This document serves as a record of the services and decisions personally performed and made by Karen Robbins MD. It was created on her behalf by Najma Knight, a trained medical scribe. The creation of this document is based on the provider's statements to the medical scribe.   Najma Knight, 2:37 PM, January 24, 2018    OBJECTIVE:   /76  Pulse 83  Temp 98  F (36.7  C) (Oral)  Resp 16  Ht 6' 2\" (1.88 m)  Wt 259 lb 14.4 oz (117.9 kg)  SpO2 95%  BMI 33.37 kg/m2    Physical Exam  GENERAL: healthy, alert and no distress  EYES: Eyes grossly normal to inspection, PERRL and conjunctivae and sclerae normal  HENT: ear canals and TM's normal, nose and mouth without ulcers or lesions  NECK: no adenopathy, no asymmetry, masses, or scars and thyroid normal to palpation, no carotid bruits   RESP: lungs clear to auscultation - no rales, rhonchi or wheezes  CV: regular rate and rhythm, normal S1 S2, no murmur, no peripheral edema and peripheral pulses strong  ABDOMEN: soft, nontender, no hepatosplenomegaly, no masses and bowel " sounds normal  MS: no gross musculoskeletal defects noted, no edema  SKIN: no suspicious lesions or rashes  NEURO: Normal strength and tone, mentation intact and speech normal  PSYCH: mentation appears normal, affect normal/bright    ASSESSMENT/PLAN:   (Z00.00) Routine history and physical examination of adult  (primary encounter diagnosis)  Comment: HEALTH CARE MAINTENANCE and immunizations reviewed and up to date; Annual preventative visit; Fasting labs scheduled for tomorrow; Colonoscopy Q10 years, due 2/2022  Plan: Comprehensive metabolic panel, Lipid panel         reflex to direct LDL Fasting    (Z11.59) Encounter for hepatitis C screening test for low risk patient  Comment: Hepatitis C screening recommended for adults born between 1945 and 1965  Plan: Hepatitis C antibody          (R09.82) Post-nasal drainage  Comment: sinus pressure;related headache;  flonase 2 sprays qd; will continue to monitor; reviewed proper technique  Plan: fluticasone (FLONASE) 50 MCG/ACT spray          (R07.89) Other chest pain  Comment: strong family history of heart disease- males in their fifties; referral for stress test; see above  Plan: Exercise Stress Echocardiogram          (M79.671,  M79.672) Pain in both feet  Comment:  burning sensation in his left dorsal foot primarily at night time; states pain is alleviated if he consistently rubs the top of his foot; may have a history of fallen arches;  denies walking barefoot; also notes that at night he experiences a jabbing pain in the plantar surface of his left foot; states that occasionally he feels it in his right foot, but primarily left  Plan: recommended shoe inserts from [employer]     Melanoma left posterior shoulder  Successful excision; no recurrence; follows with Derm.    COUNSELING:   Reviewed preventive health counseling, as reflected in patient instructions  Special attention given to:        Regular exercise       Healthy diet/nutrition     reports that he has  "never smoked. He has never used smokeless tobacco.    Estimated body mass index is 33.37 kg/(m^2) as calculated from the following:    Height as of this encounter: 6' 2\" (1.88 m).    Weight as of this encounter: 259 lb 14.4 oz (117.9 kg).   Weight management plan: Discussed and encouraged healthy diet and exercise guidlines.     Counseling Resources:  ATP IV Guidelines  Pooled Cohorts Equation Calculator  FRAX Risk Assessment  ICSI Preventive Guidelines  Dietary Guidelines for Americans, 2010  USDA's MyPlate  ASA Prophylaxis  Lung CA Screening    Karen Robbins MD  Internal Medicine  [facility] [place]    15 minutes in addition to HEALTH CARE MAINTENANCE are spent with patient, over 50% of that time spent providing counselling, discussing and reviewing medical conditions/concerns, meds and potential side effects.      The information in this document, created by a medical scribe for me, accurately reflects the services I personally performed and the decisions made by me. I have reviewed and approved this document for accuracy.  Dr. Karen Robbins, 3:02 PM, January 24, 2018    Answers for HPI/ROS submitted by the patient on 1/24/2018   PHQ-2 Score: 0    "

## 2018-01-24 NOTE — NURSING NOTE
"Chief Complaint   Patient presents with     Physical       Initial /76  Pulse 83  Temp 98  F (36.7  C) (Oral)  Resp 16  Ht 6' 2\" (1.88 m)  Wt 259 lb 14.4 oz (117.9 kg)  SpO2 95%  BMI 33.37 kg/m2 Estimated body mass index is 33.37 kg/(m^2) as calculated from the following:    Height as of this encounter: 6' 2\" (1.88 m).    Weight as of this encounter: 259 lb 14.4 oz (117.9 kg).  Medication Reconciliation: complete    "

## 2018-01-24 NOTE — MR AVS SNAPSHOT
After Visit Summary   1/24/2018    Ronald Ingram    MRN: [id number]           Patient Information     Date Of Birth          1960        Visit Information        Provider Department      1/24/2018 2:00 PM Karen Robbins MD [facility]        Today's Diagnoses     Routine history and physical examination of adult    -  1    Encounter for hepatitis C screening test for low risk patient        Post-nasal drainage        Other chest pain        Pain in both feet          Care Instructions      Preventive Health Recommendations  Male Ages 50 - 64    Yearly exam:             See your health care provider every year in order to  o   Review health changes.   o   Discuss preventive care.    o   Review your medicines if your doctor has prescribed any.     Have a cholesterol test every 5 years, or more frequently if you are at risk for high cholesterol/heart disease.     Have a diabetes test (fasting glucose) every three years. If you are at risk for diabetes, you should have this test more often.     Have a colonoscopy at age 50, or have a yearly FIT test (stool test). These exams will check for colon cancer.      Talk with your health care provider about whether or not a prostate cancer screening test (PSA) is right for you.    You should be tested each year for STDs (sexually transmitted diseases), if you re at risk.     Shots: Get a flu shot each year. Get a tetanus shot every 10 years.     Nutrition:    Eat at least 5 servings of fruits and vegetables daily.     Eat whole-grain bread, whole-wheat pasta and brown rice instead of white grains and rice.     Talk to your provider about Calcium and Vitamin D.     Lifestyle    Exercise for at least 150 minutes a week (30 minutes a day, 5 days a week). This will help you control your weight and prevent disease.     Limit alcohol to one drink per day.     No smoking.     Wear sunscreen to prevent skin cancer.     See your dentist every six  months for an exam and cleaning.     See your eye doctor every 1 to 2 years.            Follow-ups after your visit        Your next 10 appointments already scheduled     Jan 25, 2018 11:45 AM CST   LAB with  LAB   [place] [facility] ([facility])    [address]  [place] [address] [phone]           Please do not eat 10-12 hours before your appointment if you are coming in fasting for labs on lipids, cholesterol, or glucose (sugar). This does not apply to pregnant women. Water, hot tea and black coffee (with nothing added) are okay. Do not drink other fluids, diet soda or chew gum.            Feb 06, 2018  2:45 PM CST   Return Visit with Rosa Jaquez MD   [facility] ([facility])    [address] [place] [address]  [address]  [facility] [address] [phone]              Future tests that were ordered for you today     Open Future Orders        Priority Expected Expires Ordered    Hepatitis C antibody Routine 1/25/2018 3/24/2018 1/24/2018    Comprehensive metabolic panel Routine 1/25/2018 3/24/2018 1/24/2018    Lipid panel reflex to direct LDL Fasting Routine 1/25/2018 3/24/2018 1/24/2018    Exercise Stress Echocardiogram Routine  1/24/2019 1/24/2018            Who to contact     If you have questions or need follow up information about today's clinic visit or your schedule please contact [facility] directly at [phone].  Normal or non-critical lab and imaging results will be communicated to you by MyChart, letter or phone within 4 business days after the clinic has received the results. If you do not hear from us within 7 days, please contact the clinic through MyChart or phone. If you have a critical or abnormal lab result, we will notify you by phone as soon as possible.  Submit refill requests through [employer] or call your pharmacy and they will forward the refill request to us. Please allow 3 business days for  "your refill to be completed.          Additional Information About Your Visit        MyChart Information     [employer] gives you secure access to your electronic health record. If you see a primary care provider, you can also send messages to your care team and make appointments. If you have questions, please call your primary care clinic.  If you do not have a primary care provider, please call [phone] and they will assist you.        Care EveryWhere ID     This is your Care EveryWhere ID. This could be used by other organizations to access your [place] medical records  [id number]        Your Vitals Were     Pulse Temperature Respirations Height Pulse Oximetry BMI (Body Mass Index)    83 98  F (36.7  C) (Oral) 16 6' 2\" (1.88 m) 95% 33.37 kg/m2       Blood Pressure from Last 3 Encounters:   01/24/18 112/76   07/28/17 119/73   07/18/17 120/75    Weight from Last 3 Encounters:   01/24/18 259 lb 14.4 oz (117.9 kg)   07/28/17 260 lb 14.4 oz (118.3 kg)   07/18/17 262 lb (118.8 kg)                 Where to get your medicines      These medications were sent to [id number] - [place], [address] [address]     Phone:  [phone]     fluticasone 50 MCG/ACT spray          Primary Care Provider Office Phone # Fax #    Karen Kal Robbins [phone] [address]  [facility] [address]        Equal Access to Services     DON BIANCHI : Hadii aubrie ku hadasho Soyonali, waaxda luqadaha, qaybta kaalmada adeegyacynthia, lbair [facility] Hospital[facility]org[facility] davalos. So [facility] [phone].    ATENCIÓN: Si habla español, tiene a song disposición servicios gratuitos de asistencia lingüística. Llame al [phone].    We comply with applicable federal civil rights laws and [place] laws. We do not discriminate on the basis of race, color, national origin, age, disability, sex, sexual orientation, or gender identity.            Thank you!     Thank you for " choosing [facility]  for your care. Our goal is always to provide you with excellent care. Hearing back from our patients is one way we can continue to improve our services. Please take a few minutes to complete the written survey that you may receive in the mail after your visit with us. Thank you!             Your Updated Medication List - Protect others around you: Learn how to safely use, store and throw away your medicines at www.disposemymeds.org.          This list is accurate as of 1/24/18  2:58 PM.  Always use your most recent med list.                   Brand Name Dispense Instructions for use Diagnosis    fish oil-omega-3 fatty acids 1000 MG capsule     90 capsule    Take 1 capsule by mouth daily.        fluticasone 50 MCG/ACT spray    FLONASE    16 g    Spray 1-2 sprays into both nostrils daily    Post-nasal drainage       Melatonin 10 MG Tabs tablet      Take 10 mg by mouth At Bedtime        mometasone 0.1 % solution (lotion)    ELOCON    60 mL    Nightly to scalp rash as needed.    Dermatitis, seborrheic       ONE-A-DAY ENERGY Tabs      Take  by mouth.

## 2018-01-25 DIAGNOSIS — Z11.59 ENCOUNTER FOR HEPATITIS C SCREENING TEST FOR LOW RISK PATIENT: ICD-10-CM

## 2018-01-25 DIAGNOSIS — Z00.00 ROUTINE HISTORY AND PHYSICAL EXAMINATION OF ADULT: ICD-10-CM

## 2018-01-25 DIAGNOSIS — R07.89 OTHER CHEST PAIN: ICD-10-CM

## 2018-01-25 PROCEDURE — 83721 ASSAY OF BLOOD LIPOPROTEIN: CPT | Performed by: INTERNAL MEDICINE

## 2018-01-25 PROCEDURE — 86141 C-REACTIVE PROTEIN HS: CPT | Performed by: INTERNAL MEDICINE

## 2018-01-25 PROCEDURE — 80053 COMPREHEN METABOLIC PANEL: CPT | Performed by: INTERNAL MEDICINE

## 2018-01-25 PROCEDURE — 36415 COLL VENOUS BLD VENIPUNCTURE: CPT | Performed by: INTERNAL MEDICINE

## 2018-01-25 PROCEDURE — 80061 LIPID PANEL: CPT | Performed by: INTERNAL MEDICINE

## 2018-01-25 PROCEDURE — 86803 HEPATITIS C AB TEST: CPT | Performed by: INTERNAL MEDICINE

## 2018-01-26 LAB
ALBUMIN SERPL-MCNC: 3.8 G/DL (ref 3.4–5)
ALP SERPL-CCNC: 58 U/L (ref 40–150)
ALT SERPL W P-5'-P-CCNC: 49 U/L (ref 0–70)
ANION GAP SERPL CALCULATED.3IONS-SCNC: 9 MMOL/L (ref 3–14)
AST SERPL W P-5'-P-CCNC: 30 U/L (ref 0–45)
BILIRUB SERPL-MCNC: 0.5 MG/DL (ref 0.2–1.3)
BUN SERPL-MCNC: 16 MG/DL (ref 7–30)
CALCIUM SERPL-MCNC: 8.6 MG/DL (ref 8.5–10.1)
CHLORIDE SERPL-SCNC: 107 MMOL/L (ref 94–109)
CHOLEST SERPL-MCNC: 256 MG/DL
CO2 SERPL-SCNC: 24 MMOL/L (ref 20–32)
CREAT SERPL-MCNC: 1.07 MG/DL (ref 0.66–1.25)
CRP SERPL HS-MCNC: 0.5 MG/L
GFR SERPL CREATININE-BSD FRML MDRD: 71 ML/MIN/1.7M2
GLUCOSE SERPL-MCNC: 100 MG/DL (ref 70–99)
HCV AB SERPL QL IA: NONREACTIVE
HDLC SERPL-MCNC: 24 MG/DL
LDLC SERPL CALC-MCNC: ABNORMAL MG/DL
LDLC SERPL DIRECT ASSAY-MCNC: 86 MG/DL
NONHDLC SERPL-MCNC: 232 MG/DL
POTASSIUM SERPL-SCNC: 4.4 MMOL/L (ref 3.4–5.3)
PROT SERPL-MCNC: 7.8 G/DL (ref 6.8–8.8)
SODIUM SERPL-SCNC: 140 MMOL/L (ref 133–144)
TRIGL SERPL-MCNC: 919 MG/DL

## 2018-01-30 ENCOUNTER — TELEPHONE (OUTPATIENT)
Dept: FAMILY MEDICINE | Facility: CLINIC | Age: 58
End: 2018-01-30

## 2018-01-30 NOTE — TELEPHONE ENCOUNTER
Patient stopped by clinic would like vaughn to call him to go over his test results is very concerned about the numbers. I offered an appt he wasn't to talk to vaughn hargrove

## 2018-02-01 NOTE — TELEPHONE ENCOUNTER
Called and left a message for pt.  He was sleeping and I advised will call back next week when Dr. Robbins is back in the office.

## 2018-02-05 NOTE — PROGRESS NOTES
Cholesterol labs reviewed; note elevated TRIG, medication list reviewed. No cholesterol medication noted. Recommend follow up to consider cholesterol medication in addition to healthy diet and regular exercise. Elevated cholesterol, especially TG can increased risk for pancreatitis.   Lab Results       Component                Value               Date                       TRIG                     919                 01/25/2018                 TRIG                     668                 10/19/2016             Pt advised via My Chart.

## 2018-02-06 ENCOUNTER — OFFICE VISIT (OUTPATIENT)
Dept: DERMATOLOGY | Facility: CLINIC | Age: 58
End: 2018-02-06
Payer: COMMERCIAL

## 2018-02-06 DIAGNOSIS — L57.0 AK (ACTINIC KERATOSIS): ICD-10-CM

## 2018-02-06 DIAGNOSIS — L82.1 SEBORRHEIC KERATOSIS: ICD-10-CM

## 2018-02-06 DIAGNOSIS — C43.62 MELANOMA OF LEFT UPPER ARM (H): Primary | ICD-10-CM

## 2018-02-06 PROCEDURE — 17000 DESTRUCT PREMALG LESION: CPT | Performed by: DERMATOLOGY

## 2018-02-06 PROCEDURE — 17003 DESTRUCT PREMALG LES 2-14: CPT | Performed by: DERMATOLOGY

## 2018-02-06 PROCEDURE — 99214 OFFICE O/P EST MOD 30 MIN: CPT | Mod: 25 | Performed by: DERMATOLOGY

## 2018-02-06 NOTE — LETTER
"  2/6/2018      RE: Ronald Ingram  [address] [facility] [address]       Chief Complaint   Patient presents with     RECHECK      3 month AK f/u-is going to be put on triglyceride medication, the scalp is better, using humidifier,        Initial There were no vitals taken for this visit. Estimated body mass index is 33.37 kg/(m^2) as calculated from the following:    Height as of 1/24/18: 6' 2\" (188 cm).    Weight as of 1/24/18: 259 lb 14.4 oz (117.9 kg).  Medication Reconciliation: complete   Elvi Haynes MA                           Dermatology Clinic Note     Dermatology Problem List:  1. Melanoma, L upper arm Breslow at least 2.6 mm, >1 mitosis, + ulceration. [place] node negative. Diagnosis 6/6/17  2. Benign pigmented nevi   3. Monitoring papule on the nasal tip- resolved  4. Aks   5. Seborrheic keratoses  6. Tanning bed use    Service Date: 02/06/2018      CHIEF COMPLAINT:  Melanoma skin check.      HISTORY OF PRESENT ILLNESS:  Mr. Ingram is a 57-year-old male presenting to Dermatology for a 3-month melanoma skin check.  He notes that he has an itchy bump on his left upper back.  No other bleeding or painful areas.  He has several scaling spots that often peel off.      Mr. Ingram notes that he has an upcoming trip to [place] planned.  He has been doing indoor tanning.  He does not plan to wear a swim shirt when in [place].      Patient Active Problem List   Diagnosis     Mixed hyperlipidemia     Labral tear of shoulder- left; work related     CARDIOVASCULAR SCREENING; LDL GOAL LESS THAN 160     Pain in joint involving ankle and foot     Ankle joint stiffness     Right shoulder pain     Grief     AK (actinic keratosis)     Solar lentigo     Seborrheic keratosis     Melanoma of left upper arm (H)     SK (seborrheic keratosis)       Allergies   Allergen Reactions     Dust Mites      Nasal cavity tingles          Current Outpatient Prescriptions   Medication     fluticasone (FLONASE) 50 MCG/ACT spray "     Melatonin 10 MG TABS tablet     fish oil-omega-3 fatty acids (OMEGA 3) 1000 MG capsule     Multiple Vitamins-Minerals (ONE-A-DAY ENERGY) TABS     mometasone (ELOCON) 0.1 % lotion     No current facility-administered medications for this visit.           SOCIAL HISTORY:  The patient is .      FAMILY HISTORY:  No family history of skin cancer.      REVIEW OF SYSTEMS:  Feels well without additional skin concerns.      PHYSICAL EXAMINATION:   GENERAL:  The patient is a healthy-appearing 57-year-old male in no distress.   HEENT:  Conjunctivae are clear.   PULMONARY:  Breathing comfortably on room air.   ABDOMEN:  No abdominal distention.   SKIN:  Examination today included the scalp, face, neck, chest, abdomen, back, arms, legs, hands, feet, buttocks.  Skin exam was normal except for as follows:   -Extensive tan, flat-topped, waxy papules on the central back, anterior chest, bilateral dorsal arms.   -Light brown macules on the superior shoulders, arms, back.   -Resolved pink papule, left nasal tip.   -Scattered 3-4 mm medium brown macules on the arms, legs, feet, with reticular pigment network.   -1.5 mm dark brown macule on the left great toe.   -Gritty papules on the right preauricular cheek x3 and left preauricular cheek x2.   -Left upper back with fleshy 4 mm pink papule.   -Smaller fleshy papule on the left lower back.   -No cervical, inguinal, axillary lymphad     ASSESSMENT AND PLAN:   1.  Seborrheic keratoses, benign hyperkeratotic papules.  No treatment advised.     2.  Benign pigmented nevi.  No lesions of concern.  Darkest nevus on foot, photographed today.     3.  History of malignant melanoma.  No signs of local recurrence.  No lymphadenopathy on physical exam today.  We will need skin checks every 3 months until 06/2019.  Discussed the importance of sun protection and avoidance of indoor tanning.     4.  Actinic keratoses.  A total of 5 lesions treated with liquid nitrogen today.  Wound info  discussed.     5.  Irritated dermal nevus on left upper back.  No features of concern today.  We will continue to monitor.      The patient to return in 3-4 months' time for repeat skin check.     Rey Jaquez MD  Dermatology Staff       cc:   Karen Robbins MD   [facility]   [address] [place] [address]         REY JAQUEZ MD             D: 2018   T: 2018   MT: al      Name:     KAREN GLORIA   MRN:      [id number]        Account:      [id number]   :      1960           Service Date: 2018      Document: [id number]        Rey Jaquez MD

## 2018-02-06 NOTE — LETTER
"2/6/2018      RE: Ronald Ingram  [address] [facility] [address]       Chief Complaint   Patient presents with     RECHECK      3 month AK f/u-is going to be put on triglyceride medication, the scalp is better, using humidifier,        Initial There were no vitals taken for this visit. Estimated body mass index is 33.37 kg/(m^2) as calculated from the following:    Height as of 1/24/18: 6' 2\" (188 cm).    Weight as of 1/24/18: 259 lb 14.4 oz (117.9 kg).  Medication Reconciliation: complete   Elvi Haynes MA                           Dermatology Clinic Note     Dermatology Problem List:  1. Melanoma, L upper arm Breslow at least 2.6 mm, >1 mitosis, + ulceration. [place] node negative. Diagnosis 6/6/17  2. Benign pigmented nevi   3. Monitoring papule on the nasal tip- resolved  4. Aks   5. Seborrheic keratoses  6. Tanning bed use    Service Date: 02/06/2018      CHIEF COMPLAINT:  Melanoma skin check.      HISTORY OF PRESENT ILLNESS:  Mr. Ingram is a 57-year-old male presenting to Dermatology for a 3-month melanoma skin check.  He notes that he has an itchy bump on his left upper back.  No other bleeding or painful areas.  He has several scaling spots that often peel off.      Mr. Ingram notes that he has an upcoming trip to [place] planned.  He has been doing indoor tanning.  He does not plan to wear a swim shirt when in [place].      Patient Active Problem List   Diagnosis     Mixed hyperlipidemia     Labral tear of shoulder- left; work related     CARDIOVASCULAR SCREENING; LDL GOAL LESS THAN 160     Pain in joint involving ankle and foot     Ankle joint stiffness     Right shoulder pain     Grief     AK (actinic keratosis)     Solar lentigo     Seborrheic keratosis     Melanoma of left upper arm (H)     SK (seborrheic keratosis)       Allergies   Allergen Reactions     Dust Mites      Nasal cavity tingles          Current Outpatient Prescriptions   Medication     fluticasone (FLONASE) 50 MCG/ACT spray     " Melatonin 10 MG TABS tablet     fish oil-omega-3 fatty acids (OMEGA 3) 1000 MG capsule     Multiple Vitamins-Minerals (ONE-A-DAY ENERGY) TABS     mometasone (ELOCON) 0.1 % lotion     No current facility-administered medications for this visit.           SOCIAL HISTORY:  The patient is .      FAMILY HISTORY:  No family history of skin cancer.      REVIEW OF SYSTEMS:  Feels well without additional skin concerns.      PHYSICAL EXAMINATION:   GENERAL:  The patient is a healthy-appearing 57-year-old male in no distress.   HEENT:  Conjunctivae are clear.   PULMONARY:  Breathing comfortably on room air.   ABDOMEN:  No abdominal distention.   SKIN:  Examination today included the scalp, face, neck, chest, abdomen, back, arms, legs, hands, feet, buttocks.  Skin exam was normal except for as follows:   -Extensive tan, flat-topped, waxy papules on the central back, anterior chest, bilateral dorsal arms.   -Light brown macules on the superior shoulders, arms, back.   -Resolved pink papule, left nasal tip.   -Scattered 3-4 mm medium brown macules on the arms, legs, feet, with reticular pigment network.   -1.5 mm dark brown macule on the left great toe.   -Gritty papules on the right preauricular cheek x3 and left preauricular cheek x2.   -Left upper back with fleshy 4 mm pink papule.   -Smaller fleshy papule on the left lower back.   -No cervical, inguinal, axillary lymphad     ASSESSMENT AND PLAN:   1.  Seborrheic keratoses, benign hyperkeratotic papules.  No treatment advised.     2.  Benign pigmented nevi.  No lesions of concern.  Darkest nevus on foot, photographed today.     3.  History of malignant melanoma.  No signs of local recurrence.  No lymphadenopathy on physical exam today.  We will need skin checks every 3 months until 06/2019.  Discussed the importance of sun protection and avoidance of indoor tanning.     4.  Actinic keratoses.  A total of 5 lesions treated with liquid nitrogen today.  Wound info  discussed.     5.  Irritated dermal nevus on left upper back.  No features of concern today.  We will continue to monitor.      The patient to return in 3-4 months' time for repeat skin check.     Rosa Jaquez MD  Dermatology Staff       cc:   Karen Robbins MD   [facility]   [address] [place] [address]              D: 2018   T: 2018   MT: al      Name:     KAREN GLORIA   MRN:      [id number]        Account:      [id number]   :      1960           Service Date: 2018      Document: [id number]

## 2018-02-06 NOTE — PATIENT INSTRUCTIONS
Pediatric Dermatology  [facility]  [address]  1st [place]  [address]  Phone: [phone]    Pediatric & Adult Dermatology  [facility]  [address]   2nd Floor  [facility] [address]  [phone]                  General information: Dr. Rosa Jaquez is a board-certified dermatologist with subspecialty certification in pediatric dermatology.     Scheduling and Nurse Triage: Dr. Jaquez sees pediatric patients on Mondays in [place] and adult and pediatric patients on Tuesdays in [place]. The remainder of the week she practices at the [facility]. Please call the above phone numbers to schedule or to talk to a nurse.     -For scheduling at the [place] or [place] locations, or to talk to the triage nurse please call the above phone number at the clinic where you were seen.     -For medication refills, please call your pharmacy.                                             SUN PROTECTION    WHY PROTECT AGAINST THE SUN?  In the past, sun exposure was thought to be a healthy benefit of outdoor activity. However, studies have shown many unhealthy effects of sun exposure, such as early aging of the skin and skin cancer.    WHAT KIND OF DAMAGE DOES THE SUN EXPOSURE CAUSE?  Part of the sun s energy that reaches earth is composed of rays of invisible ultraviolet (UV) light. When ultraviolet light rays (UVA and UVB) enter the skin, they damage skin cells, causing visible and invisible injuries.    Sunburn is a visible type of damage, which appears just a few hours after sun exposure. In many people this type of damage also causes tanning. Freckles, which occur in people with fair skin, are usually due to sun exposure. Freckles are nearly always a sign that sun damage has occurred, and therefore show the need for sun protection.    Ultraviolet light rays also cause  invisible damage to skin cells. Some of the injury is repaired but some of the cell damage adds up year after year. After 20-30 years or more, the built-up damage appears as wrinkles, age spots and even skin cancer.  Although window glass blocks UVB light, UVA rays are able to penetrate through the glass.    HOW CAN I PROTECT MY CHILD FROM EXCESSIVE SUN EXPOSURE?  1. Avoidance. Plan your activities to avoid being in the sun in the middle of the day. Sun exposure is more intense closer to the equator, in the mountains and in the summer. The sun s damaging effects are increased by reflection from water, white sand and snow. Avoid long periods of direct sun exposure. Sit or play in the shade, especially when your shadow is shorter then you are tall.   2. Use protective clothing.  Cover up with light colored clothing when outdoors including a hat to protect the scalp and face. In addition to filtering out the sun, tightly woven clothing reflects heat and helps keep you feeling cool. Sunglasses that block ultraviolet rays protect the eyes and eyelids. Multiple retailers now sell clothing and swimwear for adults and children that is made of special fabric that protects against the sun.    3. Apply a broad-spectrum UVA and UVB sunscreen with an SPF of 30 of higher and reapply approximately every two hours, even on cloudy days. If swimming or participating in intense physical activity, sunscreen may need to be applied more often.   4. Infants should be kept out of direct sun and be covered by protective clothing when possible. If sun exposure is unavoidable, sunscreen should be applied to exposed areas (i.e. face, hands).    IS SUNSCREEN SAFE?  Hats, clothing and shade are the most reliable forms of sun protection, but sunscreen is also an important part of protecting your child from the sun. Some have raised concerns about chemical sunscreens and the dangers of absorption. Most of this concern is theoretical,  and our  providers would be happy to discuss this with you.  Most dermatologists agree that the risk of unprotected sun exposure far outweighs the theoretical risks of sunscreens.      WHAT IF MY CHILD HAS SENSITIVE SKIN?  The following sunscreens may be better for your child s sensitive skin. The main active ingredients are inert, either titanium dioxide or zinc oxide. These ingredients are less irritating than chemical sunscreens.   Be wary of the word  baby  or  organic : these words don t always mean that the product is hypoallergenic.  Please also note that this list is not all-inclusive, and that we do not formally endorse any of these products.     Aveeno Active Natural Protection Mineral Block Lotion SPF 30  Aveeno Baby Natural Protection Face Stick SPF 50+  Banana Boat Natural Reflect (baby or kids) SPF 50+  [place] s Bees Chemical-Free Sunscreen SPF 30  Blue Lizard Baby SPF 30+  Blue Lizard for Sensitive Skin SPF 30+  Cotz Pediatric Pure SPF 30  Cotz Pediatric Face SPF 40  Cotz 20% Zinc SPF 35  CVS Sensitive Skin 30  CVS Baby Lotion Sunscreen SPF 60+  Mustella Broad Spectrum SPF 50+/Mineral Sunscreen Stick  Neutrogena Sensitive Skin- Pure and Free Baby SPF 30  Neutrogena Sensitive Skin-Pure and Free Baby  SPF 50+  Think Baby SPF 50+ Sunscreen  Think sport SPF 50+ Sunscreen  PreSun Sensitive Sunblock SPF 28  Vanicream Sunscreen for Sensitive Skin SPF 60  [employer] s Sensitive Skin SPF 70    WHERE CAN I BUY SUN PROTECTIVE CLOTHING AND SWIMWEAR?   Many retailers sell these products.  Coolibar, Solumbra, Sunday Afternoons, and Athleta are some examples.  Many other popular children s brands have started selling UV protective swimwear, and we recommend swimsuits that include swim shirts and don t leave extra skin exposed.   UV protective products can also be washed into clothing (eg: Rit Sun Guard Laundry UV Protectant).     SHOULD I WORRY ABOUT MY CHILD NOT GETTING ENOUGH VITAMIN D?  Vitamin D is essential for many  processes in the body, and it is important for bone growth in children.  But while the sun is one source of vitamin D, it is also the source of harmful ultraviolet radiation resulting in thousands of skin cancers each year. The official recommendation of the American Academy of Dermatology (AAD) is that vitamin D should be obtained through dietary sources and supplementation rather than from sunlight.     For more information on sun safety and more FAQs about sun protection, visit:  http://www.aad.org/media-resources/stats-and-facts/prevention-and-care/sunscreens

## 2018-02-06 NOTE — MR AVS SNAPSHOT
After Visit Summary   2/6/2018    Ronald Ingram    MRN: [id number]           Patient Information     Date Of Birth          1960        Visit Information        Provider Department      2/6/2018 2:45 PM Rosa Jaquez [place] [facility] [place]                    Pediatric D[place]atology  [facility]  [address]  1st [place]  [address]  Phone: [phone]    Pediatric & Adult Dermatology  [facility]  [address]   2nd Floor  [facility] [address]  [phone]                  General information: Dr. Rosa Jaquez is a board-certified dermatologist with subspecialty certification in pediatric dermatology.     Scheduling and Nurse Triage: Dr. Jaquez sees pediatric patients on Mondays in [place] and adult and pediatric patients on Tuesdays in [place]. The remainder of the week she practices at the [facility]. Please call the above phone numbers to schedule or to talk to a nurse.     -For scheduling at the [place] or [place] locations, or to talk to the triage nurse please call the above phone number at the clinic where you were seen.     -For medication refills, please call your pharmacy.                                             SUN PROTECTION    WHY PROTECT AGAINST THE SUN?  In the past, sun exposure was thought to be a healthy benefit of outdoor activity. However, studies have shown many unhealthy effects of sun exposure, such as early aging of the skin and skin cancer.    WHAT KIND OF DAMAGE DOES THE SUN EXPOSURE CAUSE?  Part of the sun s energy that reaches earth is composed of rays of invisible ultraviolet (UV) light. When ultraviolet light rays (UVA and UVB) enter the skin, they damage skin cells, causing visible and invisible injuries.    Sunburn is a visible type of damage, which appears just a few hours after sun  exposure. In many people this type of damage also causes tanning. Freckles, which occur in people with fair skin, are usually due to sun exposure. Freckles are nearly always a sign that sun damage has occurred, and therefore show the need for sun protection.    Ultraviolet light rays also cause invisible damage to skin cells. Some of the injury is repaired but some of the cell damage adds up year after year. After 20-30 years or more, the built-up damage appears as wrinkles, age spots and even skin cancer.  Although window glass blocks UVB light, UVA rays are able to penetrate through the glass.    HOW CAN I PROTECT MY CHILD FROM EXCESSIVE SUN EXPOSURE?  1. Avoidance. Plan your activities to avoid being in the sun in the middle of the day. Sun exposure is more intense closer to the equator, in the mountains and in the summer. The sun s damaging effects are increased by reflection from water, white sand and snow. Avoid long periods of direct sun exposure. Sit or play in the shade, especially when your shadow is shorter then you are tall.   2. Use protective clothing.  Cover up with light colored clothing when outdoors including a hat to protect the scalp and face. In addition to filtering out the sun, tightly woven clothing reflects heat and helps keep you feeling cool. Sunglasses that block ultraviolet rays protect the eyes and eyelids. Multiple retailers now sell clothing and swimwear for adults and children that is made of special fabric that protects against the sun.    3. Apply a broad-spectrum UVA and UVB sunscreen with an SPF of 30 of higher and reapply approximately every two hours, even on cloudy days. If swimming or participating in intense physical activity, sunscreen may need to be applied more often.   4. Infants should be kept out of direct sun and be covered by protective clothing when possible. If sun exposure is unavoidable, sunscreen should be applied to exposed areas (i.e. face, hands).    IS  SUNSCREEN SAFE?  Hats, clothing and shade are the most reliable forms of sun protection, but sunscreen is also an important part of protecting your child from the sun. Some have raised concerns about chemical sunscreens and the dangers of absorption. Most of this concern is theoretical,  and our providers would be happy to discuss this with you.  Most dermatologists agree that the risk of unprotected sun exposure far outweighs the theoretical risks of sunscreens.      WHAT IF MY CHILD HAS SENSITIVE SKIN?  The following sunscreens may be better for your child s sensitive skin. The main active ingredients are inert, either titanium dioxide or zinc oxide. These ingredients are less irritating than chemical sunscreens.   Be wary of the word  baby  or  organic : these words don t always mean that the product is hypoallergenic.  Please also note that this list is not all-inclusive, and that we do not formally endorse any of these products.     Aveeno Active Natural Protection Mineral Block Lotion SPF 30  Aveeno Baby Natural Protection Face Stick SPF 50+  Banana Boat Natural Reflect (baby or kids) SPF 50+  Le[place] s Bees Chemical-Free Sunscreen SPF 30  Blue Lizard Baby SPF 30+  Blue Lizard for Sensitive Skin SPF 30+  Cotz Pediatric Pure SPF 30  Cotz Pediatric Face SPF 40  Cotz 20% Zinc SPF 35  CVS Sensitive Skin 30  CVS Baby Lotion Sunscreen SPF 60+  Mustella Broad Spectrum SPF 50+/Mineral Sunscreen Stick  Neutrogena Sensitive Skin- Pure and Free Baby SPF 30  Neutrogena Sensitive Skin-Pure and Free Baby  SPF 50+  Think Baby SPF 50+ Sunscreen  Think sport SPF 50+ Sunscreen  PreSun Sensitive Sunblock SPF 28  Vanicream Sunscreen for Sensitive Skin SPF 60  [employer] s Sensitive Skin SPF 70    WHERE CAN I BUY SUN PROTECTIVE CLOTHING AND SWIMWEAR?   Many retailers sell these products.  Coolibar, Solumbra, Sunday Afternoons, and Athleta are some examples.  Many other popular children s brands have started selling UV protective  swimwear, and we recommend swimsuits that include swim shirts and don t leave extra skin exposed.   UV protective products can also be washed into clothing (eg: Rit Sun Guard Laundry UV Protectant).     SHOULD I WORRY ABOUT MY CHILD NOT GETTING ENOUGH VITAMIN D?  Vitamin D is essential for many processes in the body, and it is important for bone growth in children.  But while the sun is one source of vitamin D, it is also the source of harmful ultraviolet radiation resulting in thousands of skin cancers each year. The official recommendation of the American Academy of Dermatology (AAD) is that vitamin D should be obtained through dietary sources and supplementation rather than from sunlight.     For more information on sun safety and more FAQs about sun protection, visit:  http://www.aad.org/media-resources/stats-and-facts/prevention-and-care/sunscreens          Follow-ups after your visit        Your next 10 appointments already scheduled     Feb 07, 2018  2:00 PM CST   Ech Stress Test with RHSTRESS   [facility] [place] [facility])    [address] [facility] [id number] [phone]           1. Please bring or wear a comfortable two-piece outfit and walking shoes. 2. Stop eating 3 hours before the test. You may drink water or juice. 3. Stop all caffeine 12 hours before the test. This includes coffee, tea, soda pop, chocolate and certain medicines (such as Anacin and Excederin). Also avoid decaf coffee and tea, as these contain small amounts of caffeine. 4. No alcohol, smoking or use of other tobacco products for 12 hours before the test. 5. Refer to your provider instructions to see if you need to stop any medications (such as beta-blockers or nitrates) for this test. 6. For patients with diabetes: - If you take insulin, call your diabetes care team. Ask if you should take a   dose the morning of your test. - If you take diabetes medicine by mouth, dont take it on the  morning of your test. Bring it with you to take after the test. (If you have questions, call your diabetes care team) 7. When you arrive, please tell us if: - You have diabetes. - You have taken Viagra, Cialis or Levitra in the past 48 hours. 8. For any questions that cannot be answered, please contact the ordering physician              Who to contact     If you have questions or need follow up information about today's clinic visit or your schedule please contact [facility] directly at [phone].  Normal or non-critical lab and imaging results will be communicated to you by [employer], letter or phone within 4 business days after the clinic has received the results. If you do not hear from us within 7 days, please contact the clinic through [employer] or phone. If you have a critical or abnormal lab result, we will notify you by phone as soon as possible.  Submit refill requests through [employer] or call your pharmacy and they will forward the refill request to us. Please allow 3 business days for your refill to be completed.          Additional Information About Your Visit        [employer] Information     [employer] gives you secure access to your electronic health record. If you see a primary care provider, you can also send messages to your care team and make appointments. If you have questions, please call your primary care clinic.  If you do not have a primary care provider, please call [phone] and they will assist you.        Care EveryWhere ID     This is your Care EveryWhere ID. This could be used by other organizations to access your [place] medical records  [id number]         Blood Pressure from Last 3 Encounters:   01/24/18 112/76   07/28/17 119/73   07/18/17 120/75    Weight from Last 3 Encounters:   01/24/18 259 lb 14.4 oz (117.9 kg)   07/28/17 260 lb 14.4 oz (118.3 kg)   07/18/17 262 lb (118.8 kg)              Today, you had the following     No orders found for display       Primary Care  Provider Office Phone # Fax #    Karen Robbins [phone]       [address] [address] [facility] [address]        Equal Access to Services     MANEDON TASH : Hadbisi aubrie wang fidelo Soyonali, waaxda luqadaha, qaybta kaalmada adeevy, blair monge laAnsleyoc davalos. So [facility] [phone].    ATENCIÓN: Si habla español, tiene a song disposición servicios gratuitos de asistencia lingüística. Llame al [phone].    We comply with applicable federal civil rights laws and [place] laws. We do not discriminate on the basis of race, color, national origin, age, disability, sex, sexual orientation, or gender identity.            Thank you!     Thank you for choosing [facility]3] [facility]  for your care. Our goal is always to provide you with excellent care. Hearing back from our patients is one way we can continue to improve our services. Please take a few minutes to complete the written survey that you may receive in the mail after your visit with us. Thank you!             Your Updated Medication List - Protect others around you: Learn how to safely use, store and throw away your medicines at www.disposemymeds.org.          This list is accurate as of 2/6/18  3:20 PM.  Always use your most recent med list.                   Brand Name Dispense Instructions for use Diagnosis    fish oil-omega-3 fatty acids 1000 MG capsule     90 capsule    Take 1 capsule by mouth daily.        fluticasone 50 MCG/ACT spray    FLONASE    16 g    Spray 1-2 sprays into both nostrils daily    Post-nasal drainage       Melatonin 10 MG Tabs tablet      Take 10 mg by mouth At Bedtime        mometasone 0.1 % solution (lotion)    ELOCON    60 mL    Nightly to scalp rash as needed.    Dermatitis, seborrheic       ONE-A-DAY ENERGY Tabs      Take  by mouth.

## 2018-02-07 ENCOUNTER — HOSPITAL ENCOUNTER (OUTPATIENT)
Dept: CARDIOLOGY | Facility: CLINIC | Age: 58
Discharge: HOME | End: 2018-02-07
Attending: INTERNAL MEDICINE | Admitting: INTERNAL MEDICINE
Payer: COMMERCIAL

## 2018-02-07 DIAGNOSIS — R07.89 OTHER CHEST PAIN: ICD-10-CM

## 2018-02-07 PROCEDURE — 93321 DOPPLER ECHO F-UP/LMTD STD: CPT | Mod: TC

## 2018-02-07 PROCEDURE — 93016 CV STRESS TEST SUPVJ ONLY: CPT | Performed by: INTERNAL MEDICINE

## 2018-02-07 PROCEDURE — 93321 DOPPLER ECHO F-UP/LMTD STD: CPT | Mod: 26 | Performed by: INTERNAL MEDICINE

## 2018-02-07 PROCEDURE — 93018 CV STRESS TEST I&R ONLY: CPT | Performed by: INTERNAL MEDICINE

## 2018-02-07 PROCEDURE — 93325 DOPPLER ECHO COLOR FLOW MAPG: CPT | Mod: 26 | Performed by: INTERNAL MEDICINE

## 2018-02-07 PROCEDURE — 25500064 ZZH RX 255 OP 636: Performed by: INTERNAL MEDICINE

## 2018-02-07 PROCEDURE — 93350 STRESS TTE ONLY: CPT | Mod: 26 | Performed by: INTERNAL MEDICINE

## 2018-02-07 RX ADMIN — SULFUR HEXAFLUORIDE 4 ML: KIT at 14:39

## 2018-02-07 NOTE — PROGRESS NOTES
Treadmill stress echo with lumason completed.  Pt had some chest burning and ST depression in recovery.  Dr. Wall consulted.  Pt was sent home.

## 2018-02-07 NOTE — PROGRESS NOTES
Dermatology Clinic Note     Dermatology Problem List:  1. Melanoma, L upper arm [place] at least 2.6 mm, >1 mitosis, + ulceration. Laurel node negative. Diagnosis 6/6/17  2. Benign pigmented nevi   3. Monitoring papule on the nasal tip- resolved  4. Aks   5. Seborrheic keratoses  6. Tanning bed use    Service Date: 02/06/2018      CHIEF COMPLAINT:  Melanoma skin check.      HISTORY OF PRESENT ILLNESS:  Mr. Ingram is a 57-year-old male presenting to Dermatology for a 3-month melanoma skin check.  He notes that he has an itchy bump on his left upper back.  No other bleeding or painful areas.  He has several scaling spots that often peel off.      Mr. Ingram notes that he has an upcoming trip to [place] planned.  He has been doing indoor tanning.  He does not plan to wear a swim shirt when in [place].      Patient Active Problem List   Diagnosis     Mixed hyperlipidemia     Labral tear of shoulder- left; work related     CARDIOVASCULAR SCREENING; LDL GOAL LESS THAN 160     Pain in joint involving ankle and foot     Ankle joint stiffness     Right shoulder pain     Grief     AK (actinic keratosis)     Solar lentigo     Seborrheic keratosis     Melanoma of left upper arm (H)     SK (seborrheic keratosis)       Allergies   Allergen Reactions     Dust Mites      Nasal cavity tingles          Current Outpatient Prescriptions   Medication     fluticasone (FLONASE) 50 MCG/ACT spray     Melatonin 10 MG TABS tablet     fish oil-omega-3 fatty acids (OMEGA 3) 1000 MG capsule     Multiple Vitamins-Minerals (ONE-A-DAY ENERGY) TABS     mometasone (ELOCON) 0.1 % lotion     No current facility-administered medications for this visit.           SOCIAL HISTORY:  The patient is .      FAMILY HISTORY:  No family history of skin cancer.      REVIEW OF SYSTEMS:  Feels well without additional skin concerns.      PHYSICAL EXAMINATION:   GENERAL:  The patient is a healthy-appearing 57-year-old male in no distress.   HEENT:  Conjunctivae  are clear.   PULMONARY:  Breathing comfortably on room air.   ABDOMEN:  No abdominal distention.   SKIN:  Examination today included the scalp, face, neck, chest, abdomen, back, arms, legs, hands, feet, buttocks.  Skin exam was normal except for as follows:   -Extensive tan, flat-topped, waxy papules on the central back, anterior chest, bilateral dorsal arms.   -Light brown macules on the superior shoulders, arms, back.   -Resolved pink papule, left nasal tip.   -Scattered 3-4 mm medium brown macules on the arms, legs, feet, with reticular pigment network.   -1.5 mm dark brown macule on the left great toe.   -Gritty papules on the right preauricular cheek x3 and left preauricular cheek x2.   -Left upper back with fleshy 4 mm pink papule.   -Smaller fleshy papule on the left lower back.   -No cervical, inguinal, axillary lymphad     ASSESSMENT AND PLAN:   1.  Seborrheic keratoses, benign hyperkeratotic papules.  No treatment advised.     2.  Benign pigmented nevi.  No lesions of concern.  Darkest nevus on foot, photographed today.     3.  History of malignant melanoma.  No signs of local recurrence.  No lymphadenopathy on physical exam today.  We will need skin checks every 3 months until 2019.  Discussed the importance of sun protection and avoidance of indoor tanning.     4.  Actinic keratoses.  A total of 5 lesions treated with liquid nitrogen today.  Wound info discussed.     5.  Irritated dermal nevus on left upper back.  No features of concern today.  We will continue to monitor.      The patient to return in 3-4 months' time for repeat skin check.     Rey Jaquez MD  Dermatology Staff       cc:   Karen Robbins MD   [facility]   [address] [place] [address]         REY JAQUEZ MD             D: 2018   T: 2018   MT: al      Name:     KAREN GLORIA   MRN:      [id number]        Account:      [id number]   :      1960           Service Date:  02/06/2018      Document: [id number]

## 2018-02-21 ENCOUNTER — OFFICE VISIT (OUTPATIENT)
Dept: FAMILY MEDICINE | Facility: CLINIC | Age: 58
End: 2018-02-21
Payer: COMMERCIAL

## 2018-02-21 VITALS
OXYGEN SATURATION: 98 % | DIASTOLIC BLOOD PRESSURE: 66 MMHG | BODY MASS INDEX: 33.25 KG/M2 | SYSTOLIC BLOOD PRESSURE: 108 MMHG | HEART RATE: 78 BPM | TEMPERATURE: 98.2 F | RESPIRATION RATE: 16 BRPM | WEIGHT: 259 LBS

## 2018-02-21 DIAGNOSIS — E78.00 ELEVATED CHOLESTEROL: ICD-10-CM

## 2018-02-21 DIAGNOSIS — F51.01 PRIMARY INSOMNIA: Primary | ICD-10-CM

## 2018-02-21 DIAGNOSIS — C43.62 MELANOMA OF LEFT UPPER ARM (H): ICD-10-CM

## 2018-02-21 DIAGNOSIS — Z13.6 CARDIOVASCULAR SCREENING; LDL GOAL LESS THAN 160: ICD-10-CM

## 2018-02-21 PROCEDURE — 99214 OFFICE O/P EST MOD 30 MIN: CPT | Performed by: INTERNAL MEDICINE

## 2018-02-21 RX ORDER — TRAZODONE HYDROCHLORIDE 50 MG/1
25-50 TABLET, FILM COATED ORAL
Qty: 30 TABLET | Refills: 1 | Status: SHIPPED | OUTPATIENT
Start: 2018-02-21 | End: 2018-10-09

## 2018-02-21 RX ORDER — FENOFIBRATE 160 MG/1
160 TABLET ORAL DAILY
Qty: 30 TABLET | Refills: 6 | Status: SHIPPED | OUTPATIENT
Start: 2018-02-21 | End: 2019-01-29

## 2018-02-21 NOTE — MR AVS SNAPSHOT
After Visit Summary   2/21/2018    Ronald Ingram    MRN: [id number]           Patient Information     Date Of Birth          1960        Visit Information        Provider Department      2/21/2018 1:30 PM [facility] MD [facility]        Today's Diagnoses     Primary insomnia    -  1    Elevated cholesterol          Care Instructions    The 10-year ASCVD risk score (Beckieteddy VIEIRA Jr, et al., 2013) is: 11.7%    Values used to calculate the score:      Age: 57 years      Sex: Male      Is Non- : No      Diabetic: No      Tobacco smoker: No      Systolic Blood Pressure: 108 mmHg      Is BP treated: No      HDL Cholesterol: 24 mg/dL      Total Cholesterol: 256 mg/dL    . If risk greater than 7.5%, then statin therapy should be considered.    Recent Labs   Lab Test  01/25/18   1158  10/19/16   1101   01/14/15   0933  08/22/12   1222   CHOL  256*  262*   < >  245*  181   HDL  24*  26*   < >  26*  27*   LDL  Cannot estimate LDL when triglyceride exceeds 400 mg/dL  86  Cannot estimate LDL when triglyceride exceeds 400 mg/dL  97   < >  Cannot estimate LDL when triglyceride exceeds 400 mg/dL  125  Cannot estimate LDL when triglyceride exceeds 400 mg/dL  72   TRIG  919*  668*   < >  661*  421*   CHOLHDLRATIO   --    --    --   9.4*  6.8*    < > = values in this interval not displayed.     LDL Cholesterol Calculated   Date Value Ref Range Status   01/25/2018  <100 mg/dL Final    Cannot estimate LDL when triglyceride exceeds 400 mg/dL     LDL Cholesterol Direct   Date Value Ref Range Status   01/25/2018 86 <100 mg/dL Final     Comment:     Desirable:       <100 mg/dl   ]              Follow-ups after your visit        Future tests that were ordered for you today     Open Future Orders        Priority Expected Expires Ordered    Lipid panel reflex to direct LDL Fasting Routine 5/21/2018 8/21/2018 2/21/2018    Comprehensive metabolic panel Routine 5/21/2018  8/21/2018 2/21/2018            Who to contact     If you have questions or need follow up information about today's clinic visit or your schedule please contact [facility] directly at [phone].  Normal or non-critical lab and imaging results will be communicated to you by MyChart, letter or phone within 4 business days after the clinic has received the results. If you do not hear from us within 7 days, please contact the clinic through [employer] or phone. If you have a critical or abnormal lab result, we will notify you by phone as soon as possible.  Submit refill requests through [employer] or call your pharmacy and they will forward the refill request to us. Please allow 3 business days for your refill to be completed.          Additional Information About Your Visit        [employer] Information     [employer] gives you secure access to your electronic health record. If you see a primary care provider, you can also send messages to your care team and make appointments. If you have questions, please call your primary care clinic.  If you do not have a primary care provider, please call [phone] and they will assist you.        Care EveryWhere ID     This is your Care EveryWhere ID. This could be used by other organizations to access your [place] medical records  [id number]        Your Vitals Were     Pulse Temperature Respirations Pulse Oximetry BMI (Body Mass Index)       78 98.2  F (36.8  C) (Oral) 16 98% 33.25 kg/m2        Blood Pressure from Last 3 Encounters:   02/21/18 108/66   01/24/18 112/76   07/28/17 119/73    Weight from Last 3 Encounters:   02/21/18 259 lb (117.5 kg)   01/24/18 259 lb 14.4 oz (117.9 kg)   07/28/17 260 lb 14.4 oz (118.3 kg)                 Today's Medication Changes          These changes are accurate as of 2/21/18  2:56 PM.  If you have any questions, ask your nurse or doctor.               Start taking these medicines.        Dose/Directions    fenofibrate 160 MG  tablet   Used for:  Elevated cholesterol   Started by:  Karen Robbins MD        Dose:  160 mg   Take 1 tablet (160 mg) by mouth daily   Quantity:  30 tablet   Refills:  6       traZODone 50 MG tablet   Commonly known as:  DESYREL   Used for:  Primary insomnia   Started by:  Karen Robbins MD        Dose:  25-50 mg   Take 0.5-1 tablets (25-50 mg) by mouth nightly as needed for sleep   Quantity:  30 tablet   Refills:  1            Where to get your medicines      These medications were sent to [id number] - [place], [place] - [address] [address]  [address], [facility] [address]     Phone:  [phone]     fenofibrate 160 MG tablet    traZODone 50 MG tablet                Primary Care Provider Office Phone # Fax #    Karen Robbins [phone] [address]  [facility] [address]        Equal Access to Services     DON BIANCHI AH: Hadii aubrie ku hadasho Soomaali, waaxda luqadaha, qaybta kaalmada adeegyada, waxay nilesin hayoc alejandro . So [facility] [phone].    ATENCIÓN: Si humzala soraya, tiene a song disposición servicios gratuitos de asistencia lingüística. Llame al [phone].    We comply with applicable federal civil rights laws and [place] laws. We do not discriminate on the basis of race, color, national origin, age, disability, sex, sexual orientation, or gender identity.            Thank you!     Thank you for choosing [facility]  for your care. Our goal is always to provide you with excellent care. Hearing back from our patients is one way we can continue to improve our services. Please take a few minutes to complete the written survey that you may receive in the mail after your visit with us. Thank you!             Your Updated Medication List - Protect others around you: Learn how to safely use, store and throw away your medicines at www.disposemymeds.org.          This list is accurate as of 2/21/18  2:56 PM.  Always use  your most recent med list.                   Brand Name Dispense Instructions for use Diagnosis    fenofibrate 160 MG tablet     30 tablet    Take 1 tablet (160 mg) by mouth daily    Elevated cholesterol       fish oil-omega-3 fatty acids 1000 MG capsule     90 capsule    Take 1 capsule by mouth daily.        fluticasone 50 MCG/ACT spray    FLONASE    16 g    Spray 1-2 sprays into both nostrils daily    Post-nasal drainage       Melatonin 10 MG Tabs tablet      Take 10 mg by mouth At Bedtime        mometasone 0.1 % solution (lotion)    ELOCON    60 mL    Nightly to scalp rash as needed.    Dermatitis, seborrheic       ONE-A-DAY ENERGY Tabs      Take  by mouth.        traZODone 50 MG tablet    DESYREL    30 tablet    Take 0.5-1 tablets (25-50 mg) by mouth nightly as needed for sleep    Primary insomnia

## 2018-02-21 NOTE — PROGRESS NOTES
SUBJECTIVE:   Ronald Ingram is a 57 year old male who presents to clinic today for the following health issues:      Hyperlipidemia Follow-Up (on elevated labs)    Rate your low fat/cholesterol diet?: fair    Taking statin?  No    Other lipid medications/supplements?:  Fish oil/Omega 3, dose  without side effects    Patient is currently taking no medications    Recent Labs   Lab Test  01/25/18   1158  10/19/16   1101   01/14/15   0933  08/22/12   1222   CHOL  256*  262*   < >  245*  181   HDL  24*  26*   < >  26*  27*   LDL  Cannot estimate LDL when triglyceride exceeds 400 mg/dL  86  Cannot estimate LDL when triglyceride exceeds 400 mg/dL  97   < >  Cannot estimate LDL when triglyceride exceeds 400 mg/dL  125  Cannot estimate LDL when triglyceride exceeds 400 mg/dL  72   TRIG  919*  668*   < >  661*  421*   CHOLHDLRATIO   --    --    --   9.4*  6.8*    < > = values in this interval not displayed.     The 10-year ASCVD risk score ([place] VIEIRA Jr, et al., 2013) is: 11.7%    Values used to calculate the score:      Age: 57 years      Sex: Male      Is Non- : No      Diabetic: No      Tobacco smoker: No      Systolic Blood Pressure: 108 mmHg      Is BP treated: No      HDL Cholesterol: 24 mg/dL      Total Cholesterol: 256 mg/dL         Amount of exercise or physical activity: twice weekly rides his bike but less exercise in the winter    Problems taking medications regularly: No    Medication side effects: none    Diet: regular (no restrictions)    Problem list and histories reviewed & adjusted, as indicated.  Additional history: as documented    Labs reviewed in EPIC    Reviewed and updated as needed this visit by clinical staff  Tobacco  Allergies  Meds  Problems  Med Hx  Surg Hx  Fam Hx  Soc Hx        Reviewed and updated as needed this visit by Provider  Allergies  Meds  Problems       ROS:  CONSTITUTIONAL: NEGATIVE for fever, chills, change in weight  INTEGUMENTARY/SKIN: Hx of  malignant melanoma, follows with dermatology Dr. Jaquez; Hx of seborrheic dermatitis - use of elocon lotion; NEGATIVE for worrisome rashes, moles or lesions  RESP: NEGATIVE for significant cough or SOB  CV: NEGATIVE for chest pain, palpitations or peripheral edema  GI: Obese, BMI ~33; NEGATIVE for nausea, abdominal pain, heartburn, or change in bowel habits  NEURO: NEGATIVE for weakness, dizziness or paresthesias  ENDOCRINE: Hx of elevated triglycerides; NEGATIVE for temperature intolerance, skin/hair changes  PSYCHIATRIC: POSITIVE for sleep disturbance - use of melatonin, not effective; NEGATIVE for changes in mood or affect    This document serves as a record of the services and decisions personally performed and made by Karen Robbins MD. It was created on her behalf by Najma Knight, a trained medical scribe. The creation of this document is based on the provider's statements to the medical scribe.   Najma Knight, 2:38 PM, February 21, 2018    OBJECTIVE:     /66  Pulse 78  Temp 98.2  F (36.8  C) (Oral)  Resp 16  Wt 259 lb (117.5 kg)  SpO2 98%  BMI 33.25 kg/m2  Body mass index is 33.25 kg/(m^2).     EXAM:  GENERAL: healthy, alert and no distress  NECK: no adenopathy, no asymmetry, masses, or scars, thyroid normal to palpation and no carotid bruits  RESP: lungs clear to auscultation - no rales, rhonchi or wheezes  CV: regular rates and rhythm, normal S1 S2, no murmur, peripheral pulses strong and no peripheral edema  NEURO: Normal strength and tone, sensory exam grossly normal and mentation intact  PSYCH: mentation appears normal and affect normal/bright    Diagnostic Test Results:  none     ASSESSMENT/PLAN:     (F51.01) Primary insomnia  (primary encounter diagnosis)  Comment: pt reports issues sleeping at night; current use of melatonin 10 mg, not beneficial; pt requesting alternative medication at this time; prescribed Trazodone 50 mg at bedtime; reviewed use, risk vs benefit of sleep aid;  have previously discussed Melatonin use +/-  Plan: traZODone (DESYREL) 50 MG tablet    (E78.00) Elevated cholesterol  Comment: elevated cholesterol, primarily a triglycerides concern; ASCVD risk score is 11.7%; discussed options at this time; pt agrees to adding Fenofibrate 160 mg; will continue to monitor  Recent Labs   Lab Test  01/25/18   1158  10/19/16   1101   01/14/15   0933  08/22/12   1222   CHOL  256*  262*   < >  245*  181   HDL  24*  26*   < >  26*  27*   LDL  Cannot estimate LDL when triglyceride exceeds 400 mg/dL  86  Cannot estimate LDL when triglyceride exceeds 400 mg/dL  97   < >  Cannot estimate LDL when triglyceride exceeds 400 mg/dL  125  Cannot estimate LDL when triglyceride exceeds 400 mg/dL  72   TRIG  919*  668*   < >  661*  421*   CHOLHDLRATIO   --    --    --   9.4*  6.8*    < > = values in this interval not displayed.        Lab Results   Component Value Date    LDL  01/25/2018     Cannot estimate LDL when triglyceride exceeds 400 mg/dL    LDL 86 01/25/2018      LDL is <100, overall  Well controlled; primarily  Best to address TG;  Reassess fasting lipids in 3-4 months.  Plan: fenofibrate 160 MG tablet, Lipid panel reflex         to direct LDL Fasting, Comprehensive metabolic         panel  Triglycerides   Date Value Ref Range Status   01/25/2018 919 (H) <150 mg/dL Final     Comment:     Borderline high:  150-199 mg/dl  High:             200-499 mg/dl  Very high:       >499 mg/dl  Fasting specimen       (C43.62) Melanoma of left upper arm (H)  Comment: left upper, posterior arm treated with wide excision; overally doing  Well. No additional surgery recommended  Plan: monitor skin exam per dermatology    (Z13.6) CARDIOVASCULAR SCREENING; LDL GOAL LESS THAN 100  Comment: LDL at goal; no statin needed;   Plan: added Fenofibrate       MEDICATIONS:   Orders Placed This Encounter   Medications     fenofibrate 160 MG tablet     Sig: Take 1 tablet (160 mg) by mouth daily     Dispense:  30  tablet     Refill:  6     traZODone (DESYREL) 50 MG tablet     Sig: Take 0.5-1 tablets (25-50 mg) by mouth nightly as needed for sleep     Dispense:  30 tablet     Refill:  1     There are no discontinued medications.    Karen Robbins MD  Internal Medicine  [facility] [place]    The information in this document, created by a medical scribe for me, accurately reflects the services I personally performed and the decisions made by me. I have reviewed and approved this document for accuracy.  Dr. Karen Robbins, 3:04 PM, February 21, 2018

## 2018-02-21 NOTE — PATIENT INSTRUCTIONS
The 10-year ASCVD risk score ([place] VIEIRA Jr, et al., 2013) is: 11.7%    Values used to calculate the score:      Age: 57 years      Sex: Male      Is Non- : No      Diabetic: No      Tobacco smoker: No      Systolic Blood Pressure: 108 mmHg      Is BP treated: No      HDL Cholesterol: 24 mg/dL      Total Cholesterol: 256 mg/dL    . If risk greater than 7.5%, then statin therapy should be considered.    Recent Labs   Lab Test  01/25/18   1158  10/19/16   1101   01/14/15   0933  08/22/12   1222   CHOL  256*  262*   < >  245*  181   HDL  24*  26*   < >  26*  27*   LDL  Cannot estimate LDL when triglyceride exceeds 400 mg/dL  86  Cannot estimate LDL when triglyceride exceeds 400 mg/dL  97   < >  Cannot estimate LDL when triglyceride exceeds 400 mg/dL  125  Cannot estimate LDL when triglyceride exceeds 400 mg/dL  72   TRIG  919*  668*   < >  661*  421*   CHOLHDLRATIO   --    --    --   9.4*  6.8*    < > = values in this interval not displayed.     LDL Cholesterol Calculated   Date Value Ref Range Status   01/25/2018  <100 mg/dL Final    Cannot estimate LDL when triglyceride exceeds 400 mg/dL     LDL Cholesterol Direct   Date Value Ref Range Status   01/25/2018 86 <100 mg/dL Final     Comment:     Desirable:       <100 mg/dl   ]

## 2018-04-05 ENCOUNTER — OFFICE VISIT (OUTPATIENT)
Dept: FAMILY MEDICINE | Facility: CLINIC | Age: 58
End: 2018-04-05
Payer: COMMERCIAL

## 2018-04-05 VITALS
WEIGHT: 253.7 LBS | OXYGEN SATURATION: 95 % | HEART RATE: 84 BPM | SYSTOLIC BLOOD PRESSURE: 114 MMHG | RESPIRATION RATE: 17 BRPM | DIASTOLIC BLOOD PRESSURE: 72 MMHG | BODY MASS INDEX: 32.57 KG/M2 | TEMPERATURE: 97.8 F

## 2018-04-05 DIAGNOSIS — H61.23 BILATERAL IMPACTED CERUMEN: ICD-10-CM

## 2018-04-05 DIAGNOSIS — H92.03 OTALGIA, BILATERAL: Primary | ICD-10-CM

## 2018-04-05 PROCEDURE — 69210 REMOVE IMPACTED EAR WAX UNI: CPT | Performed by: INTERNAL MEDICINE

## 2018-04-05 PROCEDURE — 99213 OFFICE O/P EST LOW 20 MIN: CPT | Mod: 25 | Performed by: INTERNAL MEDICINE

## 2018-04-05 RX ORDER — NEOMYCIN SULFATE, POLYMYXIN B SULFATE AND HYDROCORTISONE 10; 3.5; 1 MG/ML; MG/ML; [USP'U]/ML
4 SUSPENSION/ DROPS AURICULAR (OTIC) 3 TIMES DAILY
Qty: 10 ML | Refills: 0 | Status: SHIPPED | OUTPATIENT
Start: 2018-04-05 | End: 2018-11-06

## 2018-04-05 NOTE — MR AVS SNAPSHOT
After Visit Summary   4/5/2018    Ronald Ingram    MRN: [id number]           Patient Information     Date Of Birth          1960        Visit Information        Provider Department      4/5/2018 1:30 PM Karen Robbins MD [facility]        Today's Diagnoses     Otalgia, bilateral    -  1    Bilateral impacted cerumen          Care Instructions    Cerumen or wax removed  cerumen removed; canal irritation where cerumen was adherent. Drops three times per day for 5-7 days           Follow-ups after your visit        Who to contact     If you have questions or need follow up information about today's clinic visit or your schedule please contact [facility] directly at [phone].  Normal or non-critical lab and imaging results will be communicated to you by MyChart, letter or phone within 4 business days after the clinic has received the results. If you do not hear from us within 7 days, please contact the clinic through [employer] or phone. If you have a critical or abnormal lab result, we will notify you by phone as soon as possible.  Submit refill requests through [employer] or call your pharmacy and they will forward the refill request to us. Please allow 3 business days for your refill to be completed.          Additional Information About Your Visit        MyChart Information     [employer] gives you secure access to your electronic health record. If you see a primary care provider, you can also send messages to your care team and make appointments. If you have questions, please call your primary care clinic.  If you do not have a primary care provider, please call [phone] and they will assist you.        Care EveryWhere ID     This is your Care EveryWhere ID. This could be used by other organizations to access your [facility] medical records  [id number]        Your Vitals Were     Pulse Temperature Respirations Pulse Oximetry BMI (Body Mass Index)       84  97.8  F (36.6  C) (Oral) 17 95% 32.57 kg/m2        Blood Pressure from Last 3 Encounters:   04/05/18 114/72   02/21/18 108/66   01/24/18 112/76    Weight from Last 3 Encounters:   04/05/18 253 lb 11.2 oz (115.1 kg)   02/21/18 259 lb (117.5 kg)   01/24/18 259 lb 14.4 oz (117.9 kg)              We Performed the Following     REMOVE IMPACTED CERUMEN          Today's Medication Changes          These changes are accurate as of 4/5/18  2:35 PM.  If you have any questions, ask your nurse or doctor.               Start taking these medicines.        Dose/Directions    neomycin-polymyxin-hydrocortisone 3.5-63017-5 otic suspension   Commonly known as:  CORTISPORIN   Used for:  Otalgia, bilateral   Started by:  Karen Robbins MD        Dose:  4 drop   Place 4 drops into both ears 3 times daily   Quantity:  10 mL   Refills:  0            Where to get your medicines      These medications were sent to [id number] [place] [id number]     Phone:  [phone]     neomycin-polymyxin-hydrocortisone 3.5-18555-2 otic suspension                Primary Care Provider Office Phone # Fax #    Karen Robbins [phone] [address] [facility] [address]        Equal Access to Services     [facility] AH: Hadii aad ku hadasho Soomaali, waaxda luqadaha, qaybta kaalmada adeegyada, blair davalos. So [facility] [place] [facility] [phone].    ATENCIÓN: Si habla español, tiene a song disposición servicios gratuitos de asistencia lingüística. Llame al [phone].    We comply with applicable federal civil rights laws and [place] laws. We do not discriminate on the basis of race, color, national origin, age, disability, sex, sexual orientation, or gender identity.            Thank you!     Thank you for choosing [facility]  for your care. Our goal is always to provide you with excellent care. Hearing back from our  patients is one way we can continue to improve our services. Please take a few minutes to complete the written survey that you may receive in the mail after your visit with us. Thank you!             Your Updated Medication List - Protect others around you: Learn how to safely use, store and throw away your medicines at www.disposemymeds.org.          This list is accurate as of 4/5/18  2:35 PM.  Always use your most recent med list.                   Brand Name Dispense Instructions for use Diagnosis    fenofibrate 160 MG tablet     30 tablet    Take 1 tablet (160 mg) by mouth daily    Elevated cholesterol       fish oil-omega-3 fatty acids 1000 MG capsule     90 capsule    Take 1 capsule by mouth daily.        fluticasone 50 MCG/ACT spray    FLONASE    16 g    Spray 1-2 sprays into both nostrils daily    Post-nasal drainage       Melatonin 10 MG Tabs tablet      Take 10 mg by mouth At Bedtime        mometasone 0.1 % solution (lotion)    ELOCON    60 mL    Nightly to scalp rash as needed.    Dermatitis, seborrheic       neomycin-polymyxin-hydrocortisone 3.5-36975-0 otic suspension    CORTISPORIN    10 mL    Place 4 drops into both ears 3 times daily    Otalgia, bilateral       ONE-A-DAY ENERGY Tabs      Take  by mouth.        traZODone 50 MG tablet    DESYREL    30 tablet    Take 0.5-1 tablets (25-50 mg) by mouth nightly as needed for sleep    Primary insomnia

## 2018-04-05 NOTE — PATIENT INSTRUCTIONS
Cerumen or wax removed  cerumen removed; canal irritation where cerumen was adherent. Drops three times per day for 5-7 days

## 2018-04-05 NOTE — PROGRESS NOTES
SUBJECTIVE:   Ronald Ingram is a 57 year old male who presents to clinic today for the following health issues:      Bilateral ears      Duration: for about 3 weeks    Description (location/character/radiation): ears feel plugged    Intensity:  moderate    Accompanying signs and symptoms: some discomfort intermittently     History (similar episodes/previous evaluation): one other episode    Precipitating or alleviating factors: None    Therapies tried and outcome: None       No pain, fever or ear pain.  Uses ear protection     Problem list and histories reviewed & adjusted, as indicated.  Additional history: as documented    Patient Active Problem List   Diagnosis     Mixed hyperlipidemia     Labral tear of shoulder- left; work related     CARDIOVASCULAR SCREENING; LDL GOAL LESS THAN 160     Pain in joint involving ankle and foot     Ankle joint stiffness     Right shoulder pain     Grief     AK (actinic keratosis)     Solar lentigo     Seborrheic keratosis     Melanoma of left upper arm (H)     SK (seborrheic keratosis)     Past Surgical History:   Procedure Laterality Date     APPENDECTOMY      age 16     BIOPSY NODE SENTINEL Left 7/18/2017    Procedure: BIOPSY NODE SENTINEL;  Wide Local Excision of Left Arm Melanoma and Tennyson Lymph Node Biopsy;  Surgeon: Lele Hoyos MD;  Location: [facility] OR     ORTHOPEDIC SURGERY      left ankle       Social History   Substance Use Topics     Smoking status: Never Smoker     Smokeless tobacco: Never Used     Alcohol use Yes      Comment: rarely     Family History   Problem Relation Age of Onset     Cardiovascular Father      DIABETES Father      HEART DISEASE Father      Alcohol/Drug Brother      Alcohol/Drug Brother          Current Outpatient Prescriptions   Medication Sig Dispense Refill     neomycin-polymyxin-hydrocortisone (CORTISPORIN) 3.5-41331-5 otic suspension Place 4 drops into both ears 3 times daily 10 mL 0     fenofibrate 160 MG tablet Take 1 tablet (160 mg) by  mouth daily 30 tablet 6     traZODone (DESYREL) 50 MG tablet Take 0.5-1 tablets (25-50 mg) by mouth nightly as needed for sleep 30 tablet 1     fluticasone (FLONASE) 50 MCG/ACT spray Spray 1-2 sprays into both nostrils daily 16 g 2     Melatonin 10 MG TABS tablet Take 10 mg by mouth At Bedtime       mometasone (ELOCON) 0.1 % lotion Nightly to scalp rash as needed. 60 mL 5     fish oil-omega-3 fatty acids (OMEGA 3) 1000 MG capsule Take 1 capsule by mouth daily. 90 capsule 3     Multiple Vitamins-Minerals (ONE-A-DAY ENERGY) TABS Take  by mouth.       Allergies   Allergen Reactions     Dust Mites      Nasal cavity tingles      Recent Labs   Lab Test  01/25/18   1158  10/19/16   1101  11/25/15   0929  01/14/15   0933   02/15/12   1131  05/11/11   1434   A1C   --   5.1  5.5   --    --    --    --    LDL  Cannot estimate LDL when triglyceride exceeds 400 mg/dL  86  Cannot estimate LDL when triglyceride exceeds 400 mg/dL  97  Cannot estimate LDL when triglyceride exceeds 400 mg/dL  124*  Cannot estimate LDL when triglyceride exceeds 400 mg/dL  125   < >  Cannot estimate LDL when triglyceride exceeds 400 mg/dL  97  115   HDL  24*  26*  25*  26*   < >  27*  29*   TRIG  919*  668*  611*  661*   < >  647*  356*   ALT  49  40   --   44   --   43  47   CR  1.07  1.10  1.01  1.13   --   1.21  1.21   GFRESTIMATED  71  69  77  68   --   63  63   GFRESTBLACK  86  84  >90   GFR Calc    82   --   77  77   POTASSIUM  4.4  4.2  4.2  4.0   --   3.9  4.4   TSH   --    --    --    --    --   1.48  1.12    < > = values in this interval not displayed.      BP Readings from Last 3 Encounters:   04/05/18 114/72   02/21/18 108/66   01/24/18 112/76    Wt Readings from Last 3 Encounters:   04/05/18 253 lb 11.2 oz (115.1 kg)   02/21/18 259 lb (117.5 kg)   01/24/18 259 lb 14.4 oz (117.9 kg)                    Reviewed and updated as needed this visit by clinical staff  Tobacco  Allergies  Meds  Problems  Med Hx  Surg Hx   Fam Hx  Soc Hx        Reviewed and updated as needed this visit by Provider  Allergies  Meds  Problems         ROS:  CONSTITUTIONAL: NEGATIVE for fever, chills, change in weight  ENT/MOUTH: ears feel full; noting decreased hearing;  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  GENERAL: healthy, alert and no distress  EYES: Eyes grossly normal to inspection  HENT: bilateral ear canals with cerumen impaction; failed manual extraction with curette. nose and mouth normal  CV: regular rates and rhythm    Failed manual extraction; successful irrigation; right canal with irritation where cerumen was adherent;          ASSESSMENT/PLAN:       (H92.03) Otalgia, bilateral  (primary encounter diagnosis)  Comment: Failed manual extraction with curette ; successful irrigation; cerumen removed; canal irritation  ( Right >Left) where cerumen was adherent. Add drops due to increased risk for infection.  Plan: neomycin-polymyxin-hydrocortisone (CORTISPORIN)        3.5-06284-9 otic suspension          (H61.23) Bilateral impacted cerumen  Comment: bilateral; failed manual extraction with curette  Plan: REMOVE IMPACTED CERUMEN    Karen Robbins MD  Internal Medicine   [facility]

## 2018-06-19 ENCOUNTER — OFFICE VISIT (OUTPATIENT)
Dept: DERMATOLOGY | Facility: CLINIC | Age: 58
End: 2018-06-19
Payer: COMMERCIAL

## 2018-06-19 DIAGNOSIS — L82.1 SEBORRHEIC KERATOSIS: ICD-10-CM

## 2018-06-19 DIAGNOSIS — L81.4 SOLAR LENTIGO: Primary | ICD-10-CM

## 2018-06-19 DIAGNOSIS — C43.62 MELANOMA OF LEFT UPPER ARM (H): ICD-10-CM

## 2018-06-19 PROCEDURE — 99214 OFFICE O/P EST MOD 30 MIN: CPT | Performed by: DERMATOLOGY

## 2018-06-19 NOTE — PATIENT INSTRUCTIONS
Pediatric Dermatology  [facility]  [address]  1st [place]  [address]  Phone: [phone]    Pediatric & Adult Dermatology  [facility]  [address] [facility]   2nd Floor  [facility] [address]  [phone]                  General information: Dr. Rosa Jaquez is a board-certified dermatologist with subspecialty certification in pediatric dermatology.     Scheduling and Nurse Triage: Dr. Jaquez sees pediatric patients on Mondays in [place] and adult and pediatric patients on Tuesdays in [place]. The remainder of the week she practices [facility]. Please call the above phone numbers to schedule or to talk to a nurse.     -For scheduling at the [place] or [place] locations, or to talk to the triage nurse please call the above phone number at the clinic where you were seen.     -For medication refills, please call your pharmacy.

## 2018-06-19 NOTE — PROGRESS NOTES
Dermatology Problem List:  1. Melanoma, L upper arm Breslow at least 2.6 mm, >1 mitosis, + ulceration. Burton node negative. Diagnosis 6/6/17  2. Benign pigmented nevi   3. Monitoring macule on the R dorsal toe  4. Aks   5. Seborrheic keratoses  6. Tanning bed use    CHIEF COMPLAINT:  Melanoma skin check.       HISTORY OF PRESENT ILLNESS:  Mr. Ingram is a 57-year-old male presenting to Dermatology for a melanoma skin check. Last seen on 2/6/18.  He has ongoing itch to a mole on the central back, otherwise unchanged. He has a brown spot on the R forearm that is new. Not painful. Notes that he wears sunscreen and a hat when outdoors. This has been a big change for him.             Patient Active Problem List   Diagnosis     Mixed hyperlipidemia     Labral tear of shoulder- left; work related     CARDIOVASCULAR SCREENING; LDL GOAL LESS THAN 160     Pain in joint involving ankle and foot     Ankle joint stiffness     Right shoulder pain     Grief     AK (actinic keratosis)     Solar lentigo     Seborrheic keratosis     Melanoma of left upper arm (H)     SK (seborrheic keratosis)               Allergies   Allergen Reactions     Dust Mites         Nasal cavity tingles                 Current Outpatient Prescriptions   Medication     fluticasone (FLONASE) 50 MCG/ACT spray     Melatonin 10 MG TABS tablet     fish oil-omega-3 fatty acids (OMEGA 3) 1000 MG capsule     Multiple Vitamins-Minerals (ONE-A-DAY ENERGY) TABS     mometasone (ELOCON) 0.1 % lotion      No current facility-administered medications for this visit.              SOCIAL HISTORY:  The patient is .       FAMILY HISTORY:  No family history of skin cancer.       REVIEW OF SYSTEMS:  Feels well without additional skin concerns. No weight loss, lumps/bumps.        PHYSICAL EXAMINATION:   GENERAL:  The patient is a healthy-appearing 57-year-old male in no distress.   HEENT:  Conjunctivae are clear.   PULMONARY:  Breathing comfortably on room air.   ABDOMEN:   No abdominal distention.   SKIN:  Examination today included the scalp, face, neck, chest, abdomen, back, arms, legs, hands, feet, buttocks.  Skin exam was normal except for as follows:   -Extensive tan, flat-topped, waxy papules on the central back, anterior chest, bilateral dorsal arms.   -R forearm with flat waxy 1 cm plaque  -Light brown macules on the superior shoulders, arms, back.   -Scattered 3-4 mm medium brown macules on the arms, legs, feet, with reticular pigment network.   -1.5 mm dark brown macule on the left great toe.   -Left upper back with fleshy 4 mm pink papule.   -Smaller fleshy papule on the left lower back.   -No cervical, inguinal, axillary lymphad  -Distal thickening of the L great toenail plate      ASSESSMENT AND PLAN:   1.  Seborrheic keratoses, benign hyperkeratotic papules.  No treatment advised.      2.  Benign pigmented nevi.  No lesions of concern.  Darkest nevus on foot. Unchanged. Photos on 2/16/18     3.  History of malignant melanoma.  No signs of local recurrence.  No lymphadenopathy on physical exam today.  We will need skin checks every 3 months until 06/2019. Excellent use of sun protection!      4.  Irritated dermal nevus on mid back.  No features of concern today.  We will continue to monitor.       The patient to return in 3-4 months' time for repeat skin check.      Rosa Jaquez MD  Dermatology Staff         cc:   Karen Robbins MD   [facility]   [address] [place] [address]

## 2018-06-19 NOTE — MR AVS SNAPSHOT
After Visit Summary   6/19/2018    Ronald Ingram    MRN: [id number]           Patient Information     Date Of Birth          1960        Visit Information        Provider Department      6/19/2018 2:15 PM Rosa Jaquez MD [facility]        Today's Diagnoses     Solar lentigo    -  1    Seborrheic keratosis        Melanoma of left upper arm (H)          Care Instructions                    Pediatric Dermatology  [facility]  [address]  1st [place]  [address]  Phone: [phone]    Pediatric & Adult Dermatology  [facility] [employer]  [address]  2nd Floor  [facility] [address]  [phone]                  General information: Dr. Rosa Jaquez is a board-certified dermatologist with subspecialty certification in pediatric dermatology.     Scheduling and Nurse Triage: Dr. Jaquez sees pediatric patients on Mondays in [place] and adult and pediatric patients on Tuesdays in [place]. The remainder of the week she practices at the [facility]. Please call the above phone numbers to schedule or to talk to a nurse.     -For scheduling at the [place] or [place] locations, or to talk to the triage nurse please call the above phone number at the clinic where you were seen.     -For medication refills, please call your pharmacy.                     Follow-ups after your visit        Who to contact     If you have questions or need follow up information about today's clinic visit or your schedule please contact [facility] directly at [phone].  Normal or non-critical lab and imaging results will be communicated to you by MyChart, letter or phone within 4 business days after the clinic has received the results. If you do not hear from us within 7 days, please contact the clinic through MyChart or phone. If you have a critical or abnormal  lab result, we will notify you by phone as soon as possible.  Submit refill requests through [employer] or call your pharmacy and they will forward the refill request to us. Please allow 3 business days for your refill to be completed.          Additional Information About Your Visit        "[employer]hart Information     [employer] gives you secure access to your electronic health record. If you see a primary care provider, you can also send messages to your care team and make appointments. If you have questions, please call your primary care clinic.  If you do not have a primary care provider, please call [phone] and they will assist you.        Care EveryWhere ID     This is your Care EveryWhere ID. This could be used by other organizations to access your [place] medical records  [id number]         Blood Pressure from Last 3 Encounters:   04/05/18 114/72   02/21/18 108/66   01/24/18 112/76    Weight from Last 3 Encounters:   04/05/18 253 lb 11.2 oz (115.1 kg)   02/21/18 259 lb (117.5 kg)   01/24/18 259 lb 14.4 oz (117.9 kg)              Today, you had the following     No orders found for display       Primary Care Provider Office Phone # Fax #    Karen Robbins [phone]       [address]  [facility] [address]        Equal Access to Services     [facility]: Hadii aad ku hadasho Soomaali, waaxda luqadaha, qaybta kaalmada adeegyada, waxay idiin hayaan ingrid alejandro . So [facility] [phone].    ATENCIÓN: Si habla español, tiene a song disposición servicios gratuitos de asistencia lingüística. Llame al [phone].    We comply with applicable federal civil rights laws and [place] laws. We do not discriminate on the basis of race, color, national origin, age, disability, sex, sexual orientation, or gender identity.            Thank you!     Thank you for choosing [facility]  for your care. Our goal is always to provide you with excellent care. Hearing back from our  patients is one way we can continue to improve our services. Please take a few minutes to complete the written survey that you may receive in the mail after your visit with us. Thank you!             Your Updated Medication List - Protect others around you: Learn how to safely use, store and throw away your medicines at www.disposemymeds.org.          This list is accurate as of 6/19/18  4:11 PM.  Always use your most recent med list.                   Brand Name Dispense Instructions for use Diagnosis    fenofibrate 160 MG tablet     30 tablet    Take 1 tablet (160 mg) by mouth daily    Elevated cholesterol       fish oil-omega-3 fatty acids 1000 MG capsule     90 capsule    Take 1 capsule by mouth daily.        fluticasone 50 MCG/ACT spray    FLONASE    16 g    Spray 1-2 sprays into both nostrils daily    Post-nasal drainage       Melatonin 10 MG Tabs tablet      Take 10 mg by mouth At Bedtime        mometasone 0.1 % solution (lotion)    ELOCON    60 mL    Nightly to scalp rash as needed.    Dermatitis, seborrheic       neomycin-polymyxin-hydrocortisone 3.5-38302-3 otic suspension    CORTISPORIN    10 mL    Place 4 drops into both ears 3 times daily    Otalgia, bilateral       ONE-A-DAY ENERGY Tabs      Take  by mouth.        traZODone 50 MG tablet    DESYREL    30 tablet    Take 0.5-1 tablets (25-50 mg) by mouth nightly as needed for sleep    Primary insomnia

## 2018-06-19 NOTE — LETTER
6/19/2018      RE: Ronald Ingram  [address] [facility] [address]       Dermatology Problem List:  1. Melanoma, L upper arm Breslow at least 2.6 mm, >1 mitosis, + ulceration. [place] node negative. Diagnosis 6/6/17  2. Benign pigmented nevi   3. Monitoring macule on the R dorsal toe  4. Aks   5. Seborrheic keratoses  6. Tanning bed use    CHIEF COMPLAINT:  Melanoma skin check.       HISTORY OF PRESENT ILLNESS:  Mr. Ingram is a 57-year-old male presenting to Dermatology for a melanoma skin check. Last seen on 2/6/18.  He has ongoing itch to a mole on the central back, otherwise unchanged. He has a brown spot on the R forearm that is new. Not painful. Notes that he wears sunscreen and a hat when outdoors. This has been a big change for him.             Patient Active Problem List   Diagnosis     Mixed hyperlipidemia     Labral tear of shoulder- left; work related     CARDIOVASCULAR SCREENING; LDL GOAL LESS THAN 160     Pain in joint involving ankle and foot     Ankle joint stiffness     Right shoulder pain     Grief     AK (actinic keratosis)     Solar lentigo     Seborrheic keratosis     Melanoma of left upper arm (H)     SK (seborrheic keratosis)               Allergies   Allergen Reactions     Dust Mites         Nasal cavity tingles                 Current Outpatient Prescriptions   Medication     fluticasone (FLONASE) 50 MCG/ACT spray     Melatonin 10 MG TABS tablet     fish oil-omega-3 fatty acids (OMEGA 3) 1000 MG capsule     Multiple Vitamins-Minerals (ONE-A-DAY ENERGY) TABS     mometasone (ELOCON) 0.1 % lotion      No current facility-administered medications for this visit.              SOCIAL HISTORY:  The patient is .       FAMILY HISTORY:  No family history of skin cancer.       REVIEW OF SYSTEMS:  Feels well without additional skin concerns. No weight loss, lumps/bumps.        PHYSICAL EXAMINATION:   GENERAL:  The patient is a healthy-appearing 57-year-old male in no distress.   HEENT:   Conjunctivae are clear.   PULMONARY:  Breathing comfortably on room air.   ABDOMEN:  No abdominal distention.   SKIN:  Examination today included the scalp, face, neck, chest, abdomen, back, arms, legs, hands, feet, buttocks.  Skin exam was normal except for as follows:   -Extensive tan, flat-topped, waxy papules on the central back, anterior chest, bilateral dorsal arms.   -R forearm with flat waxy 1 cm plaque  -Light brown macules on the superior shoulders, arms, back.   -Scattered 3-4 mm medium brown macules on the arms, legs, feet, with reticular pigment network.   -1.5 mm dark brown macule on the left great toe.   -Left upper back with fleshy 4 mm pink papule.   -Smaller fleshy papule on the left lower back.   -No cervical, inguinal, axillary lymphad  -Distal thickening of the L great toenail plate      ASSESSMENT AND PLAN:   1.  Seborrheic keratoses, benign hyperkeratotic papules.  No treatment advised.      2.  Benign pigmented nevi.  No lesions of concern.  Darkest nevus on foot. Unchanged. Photos on 2/16/18     3.  History of malignant melanoma.  No signs of local recurrence.  No lymphadenopathy on physical exam today.  We will need skin checks every 3 months until 06/2019. Excellent use of sun protection!      4.  Irritated dermal nevus on mid back.  No features of concern today.  We will continue to monitor.       The patient to return in 3-4 months' time for repeat skin check.      Rosa Jaquez MD  Dermatology Staff         cc:   Karen Robbins MD   [facility]   [address] [place] [address]

## 2018-10-09 ENCOUNTER — OFFICE VISIT (OUTPATIENT)
Dept: FAMILY MEDICINE | Facility: CLINIC | Age: 58
End: 2018-10-09
Payer: COMMERCIAL

## 2018-10-09 VITALS
HEIGHT: 74 IN | HEART RATE: 81 BPM | RESPIRATION RATE: 14 BRPM | SYSTOLIC BLOOD PRESSURE: 121 MMHG | BODY MASS INDEX: 31.08 KG/M2 | WEIGHT: 242.2 LBS | OXYGEN SATURATION: 99 % | TEMPERATURE: 97 F | DIASTOLIC BLOOD PRESSURE: 70 MMHG

## 2018-10-09 DIAGNOSIS — F51.01 PRIMARY INSOMNIA: ICD-10-CM

## 2018-10-09 DIAGNOSIS — R20.0 NUMBNESS AND TINGLING OF FOOT: Primary | ICD-10-CM

## 2018-10-09 DIAGNOSIS — R20.2 NUMBNESS AND TINGLING OF FOOT: Primary | ICD-10-CM

## 2018-10-09 PROCEDURE — 99213 OFFICE O/P EST LOW 20 MIN: CPT | Performed by: PHYSICIAN ASSISTANT

## 2018-10-09 RX ORDER — TRAZODONE HYDROCHLORIDE 50 MG/1
25-50 TABLET, FILM COATED ORAL
Qty: 30 TABLET | Refills: 1 | Status: SHIPPED | OUTPATIENT
Start: 2018-10-09 | End: 2019-01-29

## 2018-10-09 NOTE — MR AVS SNAPSHOT
After Visit Summary   10/9/2018    Ronald Ingram    MRN: [id number]           Patient Information     Date Of Birth          1960        Visit Information        Provider Department      10/9/2018 2:00 PM Todd Chaudhary PA-C [facility]        Today's Diagnoses     Numbness and tingling of foot    -  1    Primary insomnia          Care Instructions    Try some ibuprofen a few times daily for the next week.  Do some gentle back stretching as below.           Lie on your back with your legs bent. You can do this by placing a pillow under your knees. Or you may lie on the floor and rest your lower legs on the seat of a chair.       Lie on your side with your knees bent, and place a pillow between your knees.       Lie on your stomach over pillows.            Follow-ups after your visit        Your next 10 appointments already scheduled     Nov 06, 2018  2:00 PM CST   Return Visit with Rosa Jaquez MD   [facility] ([facility]3])    [address] [place] [address] [address]  [facility] [address] [phone]              Who to contact     If you have questions or need follow up information about today's clinic visit or your schedule please contact [facility] directly at [phone].  Normal or non-critical lab and imaging results will be communicated to you by MyChart, letter or phone within 4 business days after the clinic has received the results. If you do not hear from us within 7 days, please contact the clinic through [employer] or phone. If you have a critical or abnormal lab result, we will notify you by phone as soon as possible.  Submit refill requests through [employer] or call your pharmacy and they will forward the refill request to us. Please allow 3 business days for your refill to be completed.          Additional Information About Your Visit        [employer] Information     [employer] gives you secure access to your  "electronic health record. If you see a primary care provider, you can also send messages to your care team and make appointments. If you have questions, please call your primary care clinic.  If you do not have a primary care provider, please call [phone] and they will assist you.        Care EveryWhere ID     This is your Care EveryWhere ID. This could be used by other organizations to access your [place] medical records  [id number]        Your Vitals Were     Pulse Temperature Respirations Height Pulse Oximetry BMI (Body Mass Index)    81 97  F (36.1  C) (Tympanic) 14 6' 2\" (1.88 m) 99% 31.1 kg/m2       Blood Pressure from Last 3 Encounters:   10/09/18 121/70   04/05/18 114/72   02/21/18 108/66    Weight from Last 3 Encounters:   10/09/18 242 lb 3.2 oz (109.9 kg)   04/05/18 253 lb 11.2 oz (115.1 kg)   02/21/18 259 lb (117.5 kg)              Today, you had the following     No orders found for display         Where to get your medicines      These medications were sent to [id number] - [place], [address] [address]     Phone:  [phone]     traZODone 50 MG tablet          Primary Care Provider Office Phone # Fax #    Karen Robbins [phone] [address]  [facility] [address]        Equal Access to Services     [facility]: Hadii aad ku hadasho Soomaali, waaxda luqadaha, qaybta kaalmada saqibegyacynthia, blair alejandro . So [facility] [phone].    ATENCIÓN: Si habla español, tiene a song disposición servicios gratuitos de asistencia lingüística. Llame al [phone].    We comply with applicable federal civil rights laws and [place] laws. We do not discriminate on the basis of race, color, national origin, age, disability, sex, sexual orientation, or gender identity.            Thank you!     Thank you for choosing [facility]  for your care. Our goal is always to provide you with " excellent care. Hearing back from our patients is one way we can continue to improve our services. Please take a few minutes to complete the written survey that you may receive in the mail after your visit with us. Thank you!             Your Updated Medication List - Protect others around you: Learn how to safely use, store and throw away your medicines at www.disposemymeds.org.          This list is accurate as of 10/9/18  2:38 PM.  Always use your most recent med list.                   Brand Name Dispense Instructions for use Diagnosis    fenofibrate 160 MG tablet     30 tablet    Take 1 tablet (160 mg) by mouth daily    Elevated cholesterol       fish oil-omega-3 fatty acids 1000 MG capsule     90 capsule    Take 1 capsule by mouth daily.        fluticasone 50 MCG/ACT spray    FLONASE    16 g    Spray 1-2 sprays into both nostrils daily    Post-nasal drainage       Melatonin 10 MG Tabs tablet      Take 10 mg by mouth At Bedtime        mometasone 0.1 % solution (lotion)    ELOCON    60 mL    Nightly to scalp rash as needed.    Dermatitis, seborrheic       neomycin-polymyxin-hydrocortisone 3.5-03525-8 otic suspension    CORTISPORIN    10 mL    Place 4 drops into both ears 3 times daily    Otalgia, bilateral       ONE-A-DAY ENERGY Tabs      Take  by mouth.        traZODone 50 MG tablet    DESYREL    30 tablet    Take 0.5-1 tablets (25-50 mg) by mouth nightly as needed for sleep    Primary insomnia

## 2018-10-09 NOTE — PATIENT INSTRUCTIONS
Try some ibuprofen a few times daily for the next week.  Do some gentle back stretching as below.           Lie on your back with your legs bent. You can do this by placing a pillow under your knees. Or you may lie on the floor and rest your lower legs on the seat of a chair.       Lie on your side with your knees bent, and place a pillow between your knees.       Lie on your stomach over pillows.

## 2018-10-09 NOTE — PROGRESS NOTES
"  SUBJECTIVE:   Ronald Ingram is a 57 year old male who presents to clinic today for the following health issues:    Musculoskeletal problem/pain      Duration: 10 days     Description  Location: left foot/toes    Intensity:  6-7/10 at the worst     Accompanying signs and symptoms: tingling in toes and pain on top of foot     History  Previous similar problem: YES, last year for one week   Previous evaluation:  none    Precipitating or alleviating factors:  Trauma or overuse: no   Aggravating factors include: none    Therapies tried and outcome: nothing    -Patient is a 58yo male who presents with toe tingling in the left foot and some \"hot water pain\" on the top of his foot  -Occurs only when he lays down at night, mostly on his side  -he denies any gross back pain  -no tingling anywhere else in the extremity  -cannot identify any causal etiology   -did stop riding his bike around that time  -no hx of injury to that extremity  -no difficulty walking     Problem list and histories reviewed & adjusted, as indicated.  Additional history: as documented    Patient Active Problem List   Diagnosis     Mixed hyperlipidemia     Labral tear of shoulder- left; work related     CARDIOVASCULAR SCREENING; LDL GOAL LESS THAN 160     Pain in joint involving ankle and foot     Ankle joint stiffness     Right shoulder pain     Grief     AK (actinic keratosis)     Solar lentigo     Seborrheic keratosis     Melanoma of left upper arm (H)     SK (seborrheic keratosis)     Past Surgical History:   Procedure Laterality Date     APPENDECTOMY      age 16     BIOPSY NODE SENTINEL Left 7/18/2017    Procedure: BIOPSY NODE SENTINEL;  Wide Local Excision of Left Arm Melanoma and [place] Lymph Node Biopsy;  Surgeon: Lele Hoyos MD;  Location: [facility] OR     ORTHOPEDIC SURGERY      left ankle       Social History   Substance Use Topics     Smoking status: Never Smoker     Smokeless tobacco: Never Used     Alcohol use Yes      Comment: rarely     " "Family History   Problem Relation Age of Onset     Cardiovascular Father      Diabetes Father      HEART DISEASE Father      Alcohol/Drug Brother      Alcohol/Drug Brother            Reviewed and updated as needed this visit by clinical staff       Reviewed and updated as needed this visit by Provider         ROS:  Constitutional, HEENT, cardiovascular, pulmonary, gi and gu systems are negative, except as otherwise noted.    OBJECTIVE:     /70  Pulse 81  Temp 97  F (36.1  C) (Tympanic)  Resp 14  Ht 6' 2\" (1.88 m)  Wt 242 lb 3.2 oz (109.9 kg)  SpO2 99%  BMI 31.1 kg/m2  Body mass index is 31.1 kg/(m^2).  GENERAL: healthy, alert and no distress  MS: extremities normal- no gross deformities noted; there is no noted abnormality to the left foot or toe. His DP are intact. Strength and ROM are wnl. Laying flat in clinic today did not reproduce symptoms. He has normal sensation to mono.  BACK: no paralumbar tenderness; negative SLR.    Diagnostic Test Results:  none     ASSESSMENT/PLAN:   1. Numbness and tingling of foot  Unclear etiology for these symptoms. Given that these were primarily felt only when laying in bed at night, on his side, consider some aspect of nerve irritation. His back exam was grossly negative. Consider anti-inflammatories and stretching. To follow up if continuing, worsening or changing.    2. Primary insomnia  Refilling  - traZODone (DESYREL) 50 MG tablet; Take 0.5-1 tablets (25-50 mg) by mouth nightly as needed for sleep  Dispense: 30 tablet; Refill: 1    Todd Chaudhary PA-C  [facility]  "

## 2018-10-09 NOTE — PROGRESS NOTES
"  SUBJECTIVE:   Ronald Ingram is a 57 year old male who presents to clinic today for the following health issues:    Gout  Duration: ***  Description   Location: {GOUT LOCATION:089835} - {LEFT/RIGHT:679508}  Joint Swelling: { :156374}  Redness: { :172126}  Pain intensity:  {mild,moderate,severe:613406}  Accompanying signs and symptoms: {NONE DEFAULTED:015820::\"None\"}  History  Previous history of gout: { :529184}   Trauma to the area: { :977207}  Precipitating factors:   Alcohol usage: {ALCOHOL:261619}  Diuretic use: { :117193}  Recent illness: { :484997}  Therapies tried and outcome: {NONE DEFAULTED:551856::\"None\"}      {additional problems for provider to add:677829}    Problem list and histories reviewed & adjusted, as indicated.  Additional history: {NONE - AS DOCUMENTED:742782::\"as documented\"}    {HIST REVIEW/ LINKS 2:815023}    Reviewed and updated as needed this visit by clinical staff       Reviewed and updated as needed this visit by Provider         {PROVIDER CHARTING PREFERENCE:370150}  "

## 2018-11-06 ENCOUNTER — OFFICE VISIT (OUTPATIENT)
Dept: DERMATOLOGY | Facility: CLINIC | Age: 58
End: 2018-11-06
Payer: COMMERCIAL

## 2018-11-06 DIAGNOSIS — L82.1 SEBORRHEIC KERATOSIS: ICD-10-CM

## 2018-11-06 DIAGNOSIS — L81.4 SOLAR LENTIGO: ICD-10-CM

## 2018-11-06 DIAGNOSIS — C43.62 MELANOMA OF LEFT UPPER ARM (H): Primary | ICD-10-CM

## 2018-11-06 PROCEDURE — 99214 OFFICE O/P EST MOD 30 MIN: CPT | Performed by: DERMATOLOGY

## 2018-11-06 NOTE — LETTER
11/6/2018      RE: Ronald Ingram  [address] [facility] [address]       Dermatology Problem List:  1. Melanoma, L upper arm Breslow at least 2.6 mm, >1 mitosis, + ulceration. [place] node negative. Diagnosis 6/6/17  2. Benign pigmented nevi   3. Monitoring macule on the R dorsal toe  4. Aks   5. Seborrheic keratoses  6. Tanning bed use    CHIEF COMPLAINT:  Melanoma skin check.       HISTORY OF PRESENT ILLNESS:  Mr. Ingram is a 58-year-old male presenting to Dermatology for a melanoma skin check. Last seen on 6/19/18.   No new or changing lesions. No recurrence of coloration on the L arm.             Patient Active Problem List   Diagnosis     Mixed hyperlipidemia     Labral tear of shoulder- left; work related     CARDIOVASCULAR SCREENING; LDL GOAL LESS THAN 160     Pain in joint involving ankle and foot     Ankle joint stiffness     Right shoulder pain     Grief     AK (actinic keratosis)     Solar lentigo     Seborrheic keratosis     Melanoma of left upper arm (H)     SK (seborrheic keratosis)               Allergies   Allergen Reactions     Dust Mites         Nasal cavity tingles                 Current Outpatient Prescriptions   Medication     fluticasone (FLONASE) 50 MCG/ACT spray     Melatonin 10 MG TABS tablet     fish oil-omega-3 fatty acids (OMEGA 3) 1000 MG capsule     Multiple Vitamins-Minerals (ONE-A-DAY ENERGY) TABS     mometasone (ELOCON) 0.1 % lotion      No current facility-administered medications for this visit.              SOCIAL HISTORY:  The patient is .       FAMILY HISTORY:  No family history of skin cancer.       REVIEW OF SYSTEMS:  Feels well without additional skin concerns. No weight loss, lumps/bumps.        PHYSICAL EXAMINATION:   GENERAL:  The patient is a healthy-appearing 58-year-old male in no distress.   HEENT:  Conjunctivae are clear.   PULMONARY:  Breathing comfortably on room air.   ABDOMEN:  No abdominal distention.   SKIN:  Examination today included the scalp,  face, neck, chest, abdomen, back, arms, legs, hands, feet, buttocks.  Skin exam was normal except for as follows:   -Extensive tan, flat-topped, waxy papules on the central back, anterior chest, bilateral dorsal arms.   -R forearm with flat waxy 1 cm plaque  -Light brown macules on the superior shoulders, arms, back.   -Scattered 3-4 mm medium brown macules on the arms, legs, feet, with reticular pigment network.   -1.5 mm dark brown macule on the left great toe.   -Left upper back with fleshy 4 mm pink papule.  -Linear scar on the L shoulder. No nodularity or pigmentation.   -No cervical, inguinal, axillary lymphad        ASSESSMENT AND PLAN:   1.  Seborrheic keratoses, benign hyperkeratotic papules.  No treatment advised.      2.  Benign pigmented nevi.  No lesions of concern.  Darkest nevus on foot. Unchanged. Photos on 2/16/18     3.  History of malignant melanoma.  No signs of local recurrence.  No lymphadenopathy on physical exam today.  We will need skin checks every 3 months until 06/2019.            The patient to return in 3-4 months' time for repeat skin check.      Rosa Jaquez MD  Dermatology Staff         cc:   Karen Robbins MD   [facility]   [address] [place] [address]

## 2018-11-06 NOTE — MR AVS SNAPSHOT
After Visit Summary   11/6/2018    Ronald Ingram    MRN: [id number]           Patient Information     Date Of Birth          1960        Visit Information        Provider Department      11/6/2018 2:00 PM Rosa Jaquez MD [facility]        Today's Diagnoses     Melanoma of left upper arm (H)    -  1    Seborrheic keratosis        Solar lentigo           Follow-ups after your visit        Your next 10 appointments already scheduled     Mar 05, 2019  1:15 PM CST   Return Visit with Rosa Jaquez MD   [facility] ([facility])    [address] [place] [address]  [address]  [facility] [address]   [phone]              Who to contact     If you have questions or need follow up information about today's clinic visit or your schedule please contact [facility] directly at [phone].  Normal or non-critical lab and imaging results will be communicated to you by MyChart, letter or phone within 4 business days after the clinic has received the results. If you do not hear from us within 7 days, please contact the clinic through [facility] or phone. If you have a critical or abnormal lab result, we will notify you by phone as soon as possible.  Submit refill requests through [employer] [facility] or call your pharmacy and they will forward the refill request to us. Please allow 3 business days for your refill to be completed.          Additional Information About Your Visit        MyChart Information     [employer] gives you secure access to your electronic health record. If you see a primary care provider, you can also send messages to your care team and make appointments. If you have questions, please call your primary care clinic.  If you do not have a primary care provider, please call [phone] and they will assist you.        Care EveryWhere ID     This is your Care EveryWhere ID. This could be used by other organizations to access your [facility]  records  [id number]         Blood Pressure from Last 3 Encounters:   10/09/18 121/70   04/05/18 114/72   02/21/18 108/66    Weight from Last 3 Encounters:   10/09/18 242 lb 3.2 oz (109.9 kg)   04/05/18 253 lb 11.2 oz (115.1 kg)   02/21/18 259 lb (117.5 kg)              Today, you had the following     No orders found for display       Primary Care Provider Office Phone # Fax #    Karen Kal Robbins [phone] [address]  [facility] [address]        Equal Access to Services     [facility] : Hadii aubrie wang hadasho Soradha, waaxda luqadaha, qaybta kaalmada aderituyada, blair alejandro . So [facility] [phone].    ATENCIÓN: Si habla español, tiene a song disposición servicios gratuitos de asistencia lingüística. [facility] [phone].    We comply with applicable federal civil rights laws and [place] laws. We do not discriminate on the basis of race, color, national origin, age, disability, sex, sexual orientation, or gender identity.            Thank you!     Thank you for choosing [facility]  for your care. Our goal is always to provide you with excellent care. Hearing back from our patients is one way we can continue to improve our services. Please take a few minutes to complete the written survey that you may receive in the mail after your visit with us. Thank you!             Your Updated Medication List - Protect others around you: Learn how to safely use, store and throw away your medicines at www.disposemymeds.org.          This list is accurate as of 11/6/18 11:59 PM.  Always use your most recent med list.                   Brand Name Dispense Instructions for use Diagnosis    fenofibrate 160 MG tablet     30 tablet    Take 1 tablet (160 mg) by mouth daily    Elevated cholesterol       fish oil-omega-3 fatty acids 1000 MG capsule     90 capsule    Take 1 capsule by mouth daily.        fluticasone 50 MCG/ACT spray    FLONASE    16 g    Spray 1-2  sprays into both nostrils daily    Post-nasal drainage       Melatonin 10 MG Tabs tablet      Take 10 mg by mouth At Bedtime        mometasone 0.1 % solution (lotion)    ELOCON    60 mL    Nightly to scalp rash as needed.    Dermatitis, seborrheic       ONE-A-DAY ENERGY Tabs      Take  by mouth.        traZODone 50 MG tablet    DESYREL    30 tablet    Take 0.5-1 tablets (25-50 mg) by mouth nightly as needed for sleep    Primary insomnia

## 2018-11-07 NOTE — PROGRESS NOTES
Dermatology Problem List:  1. Melanoma, L upper arm Breslow at least 2.6 mm, >1 mitosis, + ulceration. [place] node negative. Diagnosis 6/6/17  2. Benign pigmented nevi   3. Monitoring macule on the R dorsal toe  4. Aks   5. Seborrheic keratoses  6. Tanning bed use    CHIEF COMPLAINT:  Melanoma skin check.       HISTORY OF PRESENT ILLNESS:  Mr. Ingram is a 58-year-old male presenting to Dermatology for a melanoma skin check. Last seen on 6/19/18.  No new or changing lesions. No recurrence of coloration on the L arm.             Patient Active Problem List   Diagnosis     Mixed hyperlipidemia     Labral tear of shoulder- left; work related     CARDIOVASCULAR SCREENING; LDL GOAL LESS THAN 160     Pain in joint involving ankle and foot     Ankle joint stiffness     Right shoulder pain     Grief     AK (actinic keratosis)     Solar lentigo     Seborrheic keratosis     Melanoma of left upper arm (H)     SK (seborrheic keratosis)               Allergies   Allergen Reactions     Dust Mites         Nasal cavity tingles                 Current Outpatient Prescriptions   Medication     fluticasone (FLONASE) 50 MCG/ACT spray     Melatonin 10 MG TABS tablet     fish oil-omega-3 fatty acids (OMEGA 3) 1000 MG capsule     Multiple Vitamins-Minerals (ONE-A-DAY ENERGY) TABS     mometasone (ELOCON) 0.1 % lotion      No current facility-administered medications for this visit.              SOCIAL HISTORY:  The patient is .       FAMILY HISTORY:  No family history of skin cancer.       REVIEW OF SYSTEMS:  Feels well without additional skin concerns. No weight loss, lumps/bumps.        PHYSICAL EXAMINATION:   GENERAL:  The patient is a healthy-appearing 58-year-old male in no distress.   HEENT:  Conjunctivae are clear.   PULMONARY:  Breathing comfortably on room air.   ABDOMEN:  No abdominal distention.   SKIN:  Examination today included the scalp, face, neck, chest, abdomen, back, arms, legs, hands, feet, buttocks.  Skin exam  was normal except for as follows:   -Extensive tan, flat-topped, waxy papules on the central back, anterior chest, bilateral dorsal arms.   -R forearm with flat waxy 1 cm plaque  -Light brown macules on the superior shoulders, arms, back.   -Scattered 3-4 mm medium brown macules on the arms, legs, feet, with reticular pigment network.   -1.5 mm dark brown macule on the left great toe.   -Left upper back with fleshy 4 mm pink papule.  -Linear scar on the L shoulder. No nodularity or pigmentation.   -No cervical, inguinal, axillary lymphad        ASSESSMENT AND PLAN:   1.  Seborrheic keratoses, benign hyperkeratotic papules.  No treatment advised.      2.  Benign pigmented nevi.  No lesions of concern.  Darkest nevus on foot. Unchanged. Photos on 2/16/18     3.  History of malignant melanoma.  No signs of local recurrence.  No lymphadenopathy on physical exam today.  We will need skin checks every 3 months until 06/2019.            The patient to return in 3-4 months' time for repeat skin check.      Rosa Jaquez MD  Dermatology Staff         cc:   Karen Robbins MD   [facility]   [address] [place] [address]

## 2019-01-29 ENCOUNTER — OFFICE VISIT (OUTPATIENT)
Dept: FAMILY MEDICINE | Facility: CLINIC | Age: 59
End: 2019-01-29
Payer: COMMERCIAL

## 2019-01-29 VITALS
HEART RATE: 77 BPM | HEIGHT: 74 IN | OXYGEN SATURATION: 97 % | BODY MASS INDEX: 32.08 KG/M2 | SYSTOLIC BLOOD PRESSURE: 110 MMHG | DIASTOLIC BLOOD PRESSURE: 60 MMHG | RESPIRATION RATE: 17 BRPM | TEMPERATURE: 98 F | WEIGHT: 250 LBS

## 2019-01-29 DIAGNOSIS — M65.30 TRIGGER FINGER, ACQUIRED: ICD-10-CM

## 2019-01-29 DIAGNOSIS — Z00.00 ROUTINE HISTORY AND PHYSICAL EXAMINATION OF ADULT: Primary | ICD-10-CM

## 2019-01-29 DIAGNOSIS — F51.01 PRIMARY INSOMNIA: ICD-10-CM

## 2019-01-29 DIAGNOSIS — E78.00 ELEVATED CHOLESTEROL: ICD-10-CM

## 2019-01-29 PROCEDURE — 99396 PREV VISIT EST AGE 40-64: CPT | Performed by: INTERNAL MEDICINE

## 2019-01-29 PROCEDURE — 99213 OFFICE O/P EST LOW 20 MIN: CPT | Mod: 25 | Performed by: INTERNAL MEDICINE

## 2019-01-29 RX ORDER — FENOFIBRATE 160 MG/1
160 TABLET ORAL DAILY
Qty: 90 TABLET | Refills: 3 | Status: SHIPPED | OUTPATIENT
Start: 2019-01-29 | End: 2019-12-05

## 2019-01-29 RX ORDER — TRAZODONE HYDROCHLORIDE 50 MG/1
25-50 TABLET, FILM COATED ORAL
Qty: 90 TABLET | Refills: 3 | Status: SHIPPED | OUTPATIENT
Start: 2019-01-29 | End: 2019-12-05

## 2019-01-29 ASSESSMENT — ENCOUNTER SYMPTOMS
ARTHRALGIAS: 0
SHORTNESS OF BREATH: 0
FEVER: 0
PALPITATIONS: 0
FREQUENCY: 0
NAUSEA: 0
ABDOMINAL PAIN: 0
EYE PAIN: 1
CONSTIPATION: 0
DYSURIA: 0
DIZZINESS: 0
CHILLS: 0
HEMATURIA: 0
SORE THROAT: 0
JOINT SWELLING: 0
HEMATOCHEZIA: 0
WEAKNESS: 0
HEARTBURN: 1
DIARRHEA: 0
COUGH: 0
NERVOUS/ANXIOUS: 0
MYALGIAS: 0
PARESTHESIAS: 0

## 2019-01-29 ASSESSMENT — MIFFLIN-ST. JEOR: SCORE: 2023.74

## 2019-01-29 NOTE — PROGRESS NOTES
Chief Complaint   Patient presents with     Physical         SUBJECTIVE:   CC: Ronald Ingram is an 58 year old male who presents for preventative health visit.     Physical   Annual:     Getting at least 3 servings of Calcium per day:  Yes    Bi-annual eye exam:  Yes    Dental care twice a year:  Yes    Sleep apnea or symptoms of sleep apnea:  None    Diet:  Regular (no restrictions)    Frequency of exercise:  2-3 days/week    Duration of exercise:  45-60 minutes    Taking medications regularly:  Yes    Medication side effects:  None    Additional concerns today:  No    PHQ-2 Total Score: 1      Hyperlipidemia Follow-Up      Rate your low fat/cholesterol diet?: good    Taking statin?  No    Other lipid medications/supplements?:  Fenofibrate, without side effects      Today's PHQ-2 Score:   PHQ-2 ( 1999 Pf[employer]) 1/29/2019   Q1: Little interest or pleasure in doing things 0   Q2: Feeling down, depressed or hopeless 1   PHQ-2 Score 1   Q1: Little interest or pleasure in doing things Not at all   Q2: Feeling down, depressed or hopeless Several days   PHQ-2 Score 1       Abuse: Current or Past(Physical, Sexual or Emotional)- No  Do you feel safe in your environment? Yes    Social History     Tobacco Use     Smoking status: Never Smoker     Smokeless tobacco: Never Used   Substance Use Topics     Alcohol use: Yes     Comment: rarely     Alcohol Use 1/29/2019   If you drink alcohol do you typically have greater than 3 drinks per day OR greater than 7 drinks per week? No       Last PSA:   PSA   Date Value Ref Range Status   02/15/2012 0.37 0 - 4 ug/L Final       Reviewed orders with patient. Reviewed health maintenance and updated orders accordingly - Yes  Labs reviewed in EPIC  BP Readings from Last 3 Encounters:   01/29/19 110/60   10/09/18 121/70   04/05/18 114/72    Wt Readings from Last 3 Encounters:   01/29/19 113.4 kg (250 lb)   10/09/18 109.9 kg (242 lb 3.2 oz)   04/05/18 115.1 kg (253 lb 11.2 oz)                   Patient Active Problem List   Diagnosis     Mixed hyperlipidemia     Labral tear of shoulder- left; work related     CARDIOVASCULAR SCREENING; LDL GOAL LESS THAN 160     Pain in joint involving ankle and foot     Ankle joint stiffness     Right shoulder pain     Grief     AK (actinic keratosis)     Solar lentigo     Seborrheic keratosis     Melanoma of left upper arm (H)     SK (seborrheic keratosis)     Past Surgical History:   Procedure Laterality Date     APPENDECTOMY      age 16     BIOPSY NODE SENTINEL Left 7/18/2017    Procedure: BIOPSY NODE SENTINEL;  Wide Local Excision of Left Arm Melanoma and [place] Lymph Node Biopsy;  Surgeon: Lele Hoyos MD;  Location:  OR     ORTHOPEDIC SURGERY      left ankle       Social History     Tobacco Use     Smoking status: Never Smoker     Smokeless tobacco: Never Used   Substance Use Topics     Alcohol use: Yes     Comment: rarely     Family History   Problem Relation Age of Onset     Cardiovascular Father      Diabetes Father      Heart Disease Father      Alcohol/Drug Brother      Alcohol/Drug Brother          Current Outpatient Medications   Medication Sig Dispense Refill     fenofibrate (TRIGLIDE/LOFIBRA) 160 MG tablet Take 1 tablet (160 mg) by mouth daily 90 tablet 3     fish oil-omega-3 fatty acids (OMEGA 3) 1000 MG capsule Take 1 capsule by mouth daily. 90 capsule 3     fluticasone (FLONASE) 50 MCG/ACT spray Spray 1-2 sprays into both nostrils daily 16 g 2     Melatonin 10 MG TABS tablet Take 10 mg by mouth At Bedtime       mometasone (ELOCON) 0.1 % lotion Nightly to scalp rash as needed. 60 mL 5     Multiple Vitamins-Minerals (ONE-A-DAY ENERGY) TABS Take  by mouth.       traZODone (DESYREL) 50 MG tablet Take 0.5-1 tablets (25-50 mg) by mouth nightly as needed for sleep 90 tablet 3     Allergies   Allergen Reactions     Dust Mites      Nasal cavity tingles        Reviewed and updated as needed this visit by clinical staff  Tobacco  Allergies  Meds   "Problems  Med Hx  Surg Hx  Fam Hx  Soc Hx          Reviewed and updated as needed this visit by Provider  Tobacco  Allergies  Meds  Problems  Med Hx  Surg Hx  Fam Hx        Past Medical History:   Diagnosis Date     Hyperlipidemia       Past Surgical History:   Procedure Laterality Date     APPENDECTOMY      age 16     BIOPSY NODE SENTINEL Left 7/18/2017    Procedure: BIOPSY NODE SENTINEL;  Wide Local Excision of Left Arm Melanoma and [place] Lymph Node Biopsy;  Surgeon: Lele Hoyos MD;  Location:  OR     ORTHOPEDIC SURGERY      left ankle       Review of Systems   Constitutional: Negative for chills and fever.   HENT: Positive for congestion (minimal cough and congestion). Negative for ear pain and sore throat.    Eyes: Positive for pain. Negative for visual disturbance.   Respiratory: Negative for cough and shortness of breath.    Cardiovascular: Positive for chest pain (notedwith heartburn). Negative for palpitations and peripheral edema.   Gastrointestinal: Positive for heartburn (reviewed triggers.  sodas). Negative for abdominal pain, constipation, diarrhea, hematochezia and nausea.   Genitourinary: Negative for discharge, dysuria, frequency, genital sores, hematuria, impotence and urgency.   Musculoskeletal: Negative for arthralgias, joint swelling and myalgias.        Right pinky finger intermittent trigger finger, no injury.   Skin: Negative for rash.   Neurological: Negative for dizziness, weakness and paresthesias.   Psychiatric/Behavioral: Negative for mood changes. The patient is not nervous/anxious.        Lab Results   Component Value Date    LDL  01/25/2018     Cannot estimate LDL when triglyceride exceeds 400 mg/dL    LDL 86 01/25/2018        OBJECTIVE:   /60   Pulse 77   Temp 98  F (36.7  C) (Oral)   Resp 17   Ht 1.88 m (6' 2\")   Wt 113.4 kg (250 lb)   SpO2 97%   BMI 32.10 kg/m      Physical Exam  GENERAL: healthy, alert and no distress  EYES: Eyes grossly normal to " "inspection  HENT: ear canals and TM's normal, nose and mouth without ulcers or lesions  NECK: no adenopathy, no asymmetry, masses, or scars and thyroid normal to palpation  RESP: lungs clear to auscultation - no rales, rhonchi or wheezes  CV: regular rates and rhythm, normal S1 S2, no S3 or S4, peripheral pulses strong and no peripheral edema  ABDOMEN: soft, nontender, no hepatosplenomegaly, no masses and bowel sounds normal  MS: extremities normal- no gross deformities noted- normal range of motion of right fingers, especially fifth finger  SKIN: no suspicious lesions or rashes  NEURO: Normal strength and tone, mentation intact and speech normal  PSYCH: mentation appears normal, affect normal/bright      ASSESSMENT/PLAN:   (Z00.00) Routine history and physical examination of adult  (primary encounter diagnosis)  Comment: HEALTH CARE MAINTENANCE reviewed;   Plan: immunizations reviewed.     (E78.00) Elevated cholesterol  Comment: TG have been in 900 range; healthy diet and regular exercise.contienu Fenofibrate for TG   Plan: fenofibrate (TRIGLIDE/LOFIBRA) 160 MG tablet,         Comprehensive metabolic panel, Lipid panel         reflex to direct LDL Fasting          (F51.01) Primary insomnia  Comment: discussed Trazodone and Advil PM; has not used either regualrly; discussed Trazodone on a nightly basis may result in better sleep overall.   Plan: traZODone (DESYREL) 50 MG tablet          (M65.30) Trigger finger, acquired  Comment: right 5th finger; intermittent; no pain; monitor of more bothersome, then consider Ortho eval  Plan: maintain full range of motion     COUNSELING:   Reviewed preventive health counseling, as reflected in patient instructions       Regular exercise       Healthy diet/nutrition    BP Readings from Last 1 Encounters:   01/29/19 110/60     Estimated body mass index is 32.1 kg/m  as calculated from the following:    Height as of this encounter: 1.88 m (6' 2\").    Weight as of this encounter: " 113.4 kg (250 lb).      Weight management plan: Discussed healthy diet and exercise guidelines     reports that  has never smoked. he has never used smokeless tobacco.      Counseling Resources:  ATP IV Guidelines  Pooled Cohorts Equation Calculator  FRAX Risk Assessment  ICSI Preventive Guidelines  Dietary Guidelines for Americans, 2010  USDA's MyPlate  ASA Prophylaxis  Lung CA Screening    Karen Robbins MD  Internal Medicine   [facility] [place]  15 minutes in addition to HEALTH CARE MAINTENANCE are spent with patient, over 50% of that time spent providing counselling, discussing and reviewing medical conditions/concerns, meds and potential side effects.

## 2019-01-30 DIAGNOSIS — E78.00 ELEVATED CHOLESTEROL: ICD-10-CM

## 2019-01-30 LAB
ALBUMIN SERPL-MCNC: 4.1 G/DL (ref 3.4–5)
ALP SERPL-CCNC: 40 U/L (ref 40–150)
ALT SERPL W P-5'-P-CCNC: 35 U/L (ref 0–70)
ANION GAP SERPL CALCULATED.3IONS-SCNC: 3 MMOL/L (ref 3–14)
AST SERPL W P-5'-P-CCNC: 24 U/L (ref 0–45)
BILIRUB SERPL-MCNC: 0.5 MG/DL (ref 0.2–1.3)
BUN SERPL-MCNC: 17 MG/DL (ref 7–30)
CALCIUM SERPL-MCNC: 9.2 MG/DL (ref 8.5–10.1)
CHLORIDE SERPL-SCNC: 105 MMOL/L (ref 94–109)
CHOLEST SERPL-MCNC: 229 MG/DL
CO2 SERPL-SCNC: 28 MMOL/L (ref 20–32)
CREAT SERPL-MCNC: 1.25 MG/DL (ref 0.66–1.25)
GFR SERPL CREATININE-BSD FRML MDRD: 63 ML/MIN/{1.73_M2}
GLUCOSE SERPL-MCNC: 100 MG/DL (ref 70–99)
HDLC SERPL-MCNC: 28 MG/DL
LDLC SERPL CALC-MCNC: 137 MG/DL
NONHDLC SERPL-MCNC: 201 MG/DL
POTASSIUM SERPL-SCNC: 3.9 MMOL/L (ref 3.4–5.3)
PROT SERPL-MCNC: 7.9 G/DL (ref 6.8–8.8)
SODIUM SERPL-SCNC: 136 MMOL/L (ref 133–144)
TRIGL SERPL-MCNC: 320 MG/DL

## 2019-01-30 PROCEDURE — 80061 LIPID PANEL: CPT | Performed by: INTERNAL MEDICINE

## 2019-01-30 PROCEDURE — 36415 COLL VENOUS BLD VENIPUNCTURE: CPT | Performed by: INTERNAL MEDICINE

## 2019-01-30 PROCEDURE — 80053 COMPREHEN METABOLIC PANEL: CPT | Performed by: INTERNAL MEDICINE

## 2019-04-30 ENCOUNTER — OFFICE VISIT (OUTPATIENT)
Dept: DERMATOLOGY | Facility: CLINIC | Age: 59
End: 2019-04-30
Payer: COMMERCIAL

## 2019-04-30 DIAGNOSIS — B07.8 OTHER VIRAL WARTS: ICD-10-CM

## 2019-04-30 DIAGNOSIS — C43.62 MELANOMA OF LEFT UPPER ARM (H): Primary | ICD-10-CM

## 2019-04-30 DIAGNOSIS — L57.0 AK (ACTINIC KERATOSIS): ICD-10-CM

## 2019-04-30 DIAGNOSIS — L82.1 SK (SEBORRHEIC KERATOSIS): ICD-10-CM

## 2019-04-30 PROCEDURE — 17000 DESTRUCT PREMALG LESION: CPT | Mod: 59 | Performed by: DERMATOLOGY

## 2019-04-30 PROCEDURE — 17003 DESTRUCT PREMALG LES 2-14: CPT | Mod: 59 | Performed by: DERMATOLOGY

## 2019-04-30 PROCEDURE — 17110 DESTRUCTION B9 LES UP TO 14: CPT | Performed by: DERMATOLOGY

## 2019-04-30 PROCEDURE — 99214 OFFICE O/P EST MOD 30 MIN: CPT | Mod: 25 | Performed by: DERMATOLOGY

## 2019-04-30 NOTE — LETTER
4/30/2019      RE: Ronald Ingram  [address] [facility] [address]       Dermatology Problem List:  1. Melanoma, L upper arm Breslow at least 2.6 mm, >1 mitosis, + ulceration. [place] node negative. Diagnosis 6/6/17  2. Benign pigmented nevi   3. Monitoring macule on the R dorsal toe  4. Aks - liquid nitrogen but 5FU recommended  5. Seborrheic keratoses  6. Tanning bed use    CHIEF COMPLAINT:  Melanoma skin check.       HISTORY OF PRESENT ILLNESS:  Mr. Ingram is a 58-year-old male presenting to Dermatology for a melanoma skin check. Last seen in 11/18.  No new or changing lesions. Notes facial redness when in [place], not related to sunburn. Wearing long sleeved shirt and spf when outdoors.             Patient Active Problem List   Diagnosis     Mixed hyperlipidemia     Labral tear of shoulder- left; work related     CARDIOVASCULAR SCREENING; LDL GOAL LESS THAN 160     Pain in joint involving ankle and foot     Ankle joint stiffness     Right shoulder pain     Grief     AK (actinic keratosis)     Solar lentigo     Seborrheic keratosis     Melanoma of left upper arm (H)     SK (seborrheic keratosis)               Allergies   Allergen Reactions     Dust Mites         Nasal cavity tingles                 Current Outpatient Prescriptions   Medication     fluticasone (FLONASE) 50 MCG/ACT spray     Melatonin 10 MG TABS tablet     fish oil-omega-3 fatty acids (OMEGA 3) 1000 MG capsule     Multiple Vitamins-Minerals (ONE-A-DAY ENERGY) TABS     mometasone (ELOCON) 0.1 % lotion      No current facility-administered medications for this visit.              SOCIAL HISTORY:  The patient is .       FAMILY HISTORY:  No family history of skin cancer.       REVIEW OF SYSTEMS:  Feels well without additional skin concerns. No weight loss, lumps/bumps.        PHYSICAL EXAMINATION:   GENERAL:  The patient is a healthy-appearing 58-year-old male in no distress.   HEENT:  Conjunctivae are clear.   PULMONARY:  Breathing  comfortably on room air.   ABDOMEN:  No abdominal distention.   SKIN:  Examination today included the scalp, face, neck, chest, abdomen, back, arms, legs, hands, feet, buttocks.  Skin exam was normal except for as follows:   -Extensive tan, flat-topped, waxy papules on the central back, anterior chest, bilateral dorsal arms.   -Verrucous papule on the 2nd finger of the R hand  -Light brown macules on the superior shoulders, arms, back.   -Scattered 3-4 mm medium brown macules on the arms, legs, feet, with reticular pigment network.   -1.5 mm dark brown macule on the left great toe.   -Left upper back with fleshy 4 mm pink papule.  -Linear scar on the L shoulder. No nodularity or pigmentation.   -No cervical, inguinal, axillary lymphad        ASSESSMENT AND PLAN:   1.  Seborrheic keratoses, benign hyperkeratotic papules.  No treatment advised.      2.  Benign pigmented nevi.  No lesions of concern.  Darkest nevus on foot. Unchanged. Photos on 2/16/18     3.  History of malignant melanoma.  No signs of local recurrence.  No lymphadenopathy on physical exam today.  We will need skin checks every 3 months until 06/2019.      4. Aks : 8 lesions on the face treated with liquid nitrogen. Efudex suggested, patient declines.     5. Solar lentigines: Marker of past sun injury. Ongoing sun protection recommended    6. Facial erythema- likely related to telangiectasias.     7. Viral wart on the  R 2nd finger. Treated with liquid nitrogen x 2 10 sec freeze cycles.      The patient to return in 3-4 months' time for repeat skin check.      Rosa Jaquez MD  Dermatology Staff         cc:   Karen Robbins MD   [facility]   [address] [place] [address]

## 2019-04-30 NOTE — PROGRESS NOTES
Dermatology Problem List:  1. Melanoma, L upper arm Breslow at least 2.6 mm, >1 mitosis, + ulceration. [place] node negative. Diagnosis 6/6/17  2. Benign pigmented nevi   3. Monitoring macule on the R dorsal toe  4. Aks - liquid nitrogen but 5FU recommended  5. Seborrheic keratoses  6. Tanning bed use    CHIEF COMPLAINT:  Melanoma skin check.       HISTORY OF PRESENT ILLNESS:  Mr. Ingram is a 58-year-old male presenting to Dermatology for a melanoma skin check. Last seen in 11/18.  No new or changing lesions. Notes facial redness when in HI, not related to sunburn. Wearing long sleeved shirt and spf when outdoors.             Patient Active Problem List   Diagnosis     Mixed hyperlipidemia     Labral tear of shoulder- left; work related     CARDIOVASCULAR SCREENING; LDL GOAL LESS THAN 160     Pain in joint involving ankle and foot     Ankle joint stiffness     Right shoulder pain     Grief     AK (actinic keratosis)     Solar lentigo     Seborrheic keratosis     Melanoma of left upper arm (H)     SK (seborrheic keratosis)               Allergies   Allergen Reactions     Dust Mites         Nasal cavity tingles                 Current Outpatient Prescriptions   Medication     fluticasone (FLONASE) 50 MCG/ACT spray     Melatonin 10 MG TABS tablet     fish oil-omega-3 fatty acids (OMEGA 3) 1000 MG capsule     Multiple Vitamins-Minerals (ONE-A-DAY ENERGY) TABS     mometasone (ELOCON) 0.1 % lotion      No current facility-administered medications for this visit.              SOCIAL HISTORY:  The patient is .       FAMILY HISTORY:  No family history of skin cancer.       REVIEW OF SYSTEMS:  Feels well without additional skin concerns. No weight loss, lumps/bumps.        PHYSICAL EXAMINATION:   GENERAL:  The patient is a healthy-appearing 58-year-old male in no distress.   HEENT:  Conjunctivae are clear.   PULMONARY:  Breathing comfortably on room air.   ABDOMEN:  No abdominal distention.   SKIN:  Examination today  included the scalp, face, neck, chest, abdomen, back, arms, legs, hands, feet, buttocks.  Skin exam was normal except for as follows:   -Extensive tan, flat-topped, waxy papules on the central back, anterior chest, bilateral dorsal arms.   -Verrucous papule on the 2nd finger of the R hand  -Light brown macules on the superior shoulders, arms, back.   -Scattered 3-4 mm medium brown macules on the arms, legs, feet, with reticular pigment network.   -1.5 mm dark brown macule on the left great toe.   -Left upper back with fleshy 4 mm pink papule.  -Linear scar on the L shoulder. No nodularity or pigmentation.   -No cervical, inguinal, axillary lymphad        ASSESSMENT AND PLAN:   1.  Seborrheic keratoses, benign hyperkeratotic papules.  No treatment advised.      2.  Benign pigmented nevi.  No lesions of concern.  Darkest nevus on foot. Unchanged. Photos on 2/16/18     3.  History of malignant melanoma.  No signs of local recurrence.  No lymphadenopathy on physical exam today.  We will need skin checks every 3 months until 06/2019.      4. Aks : 8 lesions on the face treated with liquid nitrogen. Efudex suggested, patient declines.     5. Solar lentigines: Marker of past sun injury. Ongoing sun protection recommended    6. Facial erythema- likely related to telangiectasias.     7. Viral wart on the  R 2nd finger. Treated with liquid nitrogen x 2 10 sec freeze cycles.      The patient to return in 3-4 months' time for repeat skin check.      Rosa Jaquez MD  Dermatology Staff         cc:   Karen Robbins MD   [facility]   [address] [place] [address]

## 2019-07-23 ENCOUNTER — OFFICE VISIT (OUTPATIENT)
Dept: DERMATOLOGY | Facility: CLINIC | Age: 59
End: 2019-07-23
Payer: COMMERCIAL

## 2019-07-23 DIAGNOSIS — L57.0 AK (ACTINIC KERATOSIS): Primary | ICD-10-CM

## 2019-07-23 PROCEDURE — 17000 DESTRUCT PREMALG LESION: CPT | Performed by: DERMATOLOGY

## 2019-07-23 PROCEDURE — 17003 DESTRUCT PREMALG LES 2-14: CPT | Performed by: DERMATOLOGY

## 2019-07-23 PROCEDURE — 99214 OFFICE O/P EST MOD 30 MIN: CPT | Mod: 25 | Performed by: DERMATOLOGY

## 2019-07-23 NOTE — LETTER
7/23/2019      RE: Ronald Ingram  [address] [facility] [address]       Dermatology Problem List:  1. Melanoma, L upper arm Breslow at least 2.6 mm, >1 mitosis, + ulceration. [place] node negative. Diagnosis 6/6/17  2. Benign pigmented nevi   3. Monitoring macule on the R dorsal toe  4. Aks - liquid nitrogen but 5FU recommended  5. Seborrheic keratoses  6. Tanning bed use    CHIEF COMPLAINT:  Melanoma skin check.       HISTORY OF PRESENT ILLNESS:  Mr. Ingram is a 58-year-old male presenting to Dermatology for a melanoma skin check. Last seen in 4/19.  No new or changing lesions. No painful or bleeding areas. Otherwise feeling well. Wearing sun protection 30+ when outdoors.            Patient Active Problem List   Diagnosis     Mixed hyperlipidemia     Labral tear of shoulder- left; work related     CARDIOVASCULAR SCREENING; LDL GOAL LESS THAN 160     Pain in joint involving ankle and foot     Ankle joint stiffness     Right shoulder pain     Grief     AK (actinic keratosis)     Solar lentigo     Seborrheic keratosis     Melanoma of left upper arm (H)     SK (seborrheic keratosis)               Allergies   Allergen Reactions     Dust Mites         Nasal cavity tingles                 Current Outpatient Prescriptions   Medication     fluticasone (FLONASE) 50 MCG/ACT spray     Melatonin 10 MG TABS tablet     fish oil-omega-3 fatty acids (OMEGA 3) 1000 MG capsule     Multiple Vitamins-Minerals (ONE-A-DAY ENERGY) TABS     mometasone (ELOCON) 0.1 % lotion      No current facility-administered medications for this visit.              SOCIAL HISTORY:  The patient is .       FAMILY HISTORY:  No family history of skin cancer.       REVIEW OF SYSTEMS:  Feels well without additional skin concerns. No weight loss, lumps/bumps.        PHYSICAL EXAMINATION:   GENERAL:  The patient is a healthy-appearing 58-year-old male in no distress.   HEENT:  Conjunctivae are clear.   PULMONARY:  Breathing comfortably on room  air.   ABDOMEN:  No abdominal distention.   SKIN:  Examination today included the scalp, face, neck, chest, abdomen, back, arms, legs, hands, feet, buttocks.  Skin exam was normal except for as follows:   -Extensive tan, flat-topped, waxy papules on the central back, anterior chest, bilateral dorsal arms.   -Light brown macules on the superior shoulders, arms, back.   -Scattered 3-4 mm medium brown macules on the arms, legs, feet, with reticular pigment network.   -1.5 mm dark brown macule on the left great toe.   -Left upper back with fleshy 4 mm pink papule.  -Linear scar on the L shoulder. No nodularity or pigmentation.   -No cervical, inguinal, axillary lymphad  -Gritty papules on the bilateral central cheeks, zygomas, temples, lateral cheeks        ASSESSMENT AND PLAN:   1.  Seborrheic keratoses, benign hyperkeratotic papules.  No treatment advised.      2.  Benign pigmented nevi.  No lesions of concern.  Darkest nevus on foot. Unchanged. Photos on 2/16/18     3.  History of malignant melanoma.  No signs of local recurrence.  No lymphadenopathy on physical exam today.  We will need skin checks every 6 months until 06/2022.      4. Aks : 12 lesions on the face treated with liquid nitrogen. Efudex suggested, patient declines.     5. Solar lentigines: Marker of past sun injury. Ongoing sun protection recommended         The patient to return in 6 months' time for repeat skin check.      Rosa Jaquez MD  Dermatology Staff         cc:   Karen Robbins MD   [facility]   [address] [place] [address]

## 2019-07-23 NOTE — PROGRESS NOTES
Dermatology Problem List:  1. Melanoma, L upper arm Breslow at least 2.6 mm, >1 mitosis, + ulceration. [place] node negative. Diagnosis 6/6/17  2. Benign pigmented nevi   3. Monitoring macule on the R dorsal toe  4. Aks - liquid nitrogen but 5FU recommended  5. Seborrheic keratoses  6. Tanning bed use    CHIEF COMPLAINT:  Melanoma skin check.       HISTORY OF PRESENT ILLNESS:  Mr. Ingram is a 58-year-old male presenting to Dermatology for a melanoma skin check. Last seen in 4/19.  No new or changing lesions. No painful or bleeding areas. Otherwise feeling well. Wearing sun protection 30+ when outdoors.            Patient Active Problem List   Diagnosis     Mixed hyperlipidemia     Labral tear of shoulder- left; work related     CARDIOVASCULAR SCREENING; LDL GOAL LESS THAN 160     Pain in joint involving ankle and foot     Ankle joint stiffness     Right shoulder pain     Grief     AK (actinic keratosis)     Solar lentigo     Seborrheic keratosis     Melanoma of left upper arm (H)     SK (seborrheic keratosis)               Allergies   Allergen Reactions     Dust Mites         Nasal cavity tingles                 Current Outpatient Prescriptions   Medication     fluticasone (FLONASE) 50 MCG/ACT spray     Melatonin 10 MG TABS tablet     fish oil-omega-3 fatty acids (OMEGA 3) 1000 MG capsule     Multiple Vitamins-Minerals (ONE-A-DAY ENERGY) TABS     mometasone (ELOCON) 0.1 % lotion      No current facility-administered medications for this visit.              SOCIAL HISTORY:  The patient is .       FAMILY HISTORY:  No family history of skin cancer.       REVIEW OF SYSTEMS:  Feels well without additional skin concerns. No weight loss, lumps/bumps.        PHYSICAL EXAMINATION:   GENERAL:  The patient is a healthy-appearing 58-year-old male in no distress.   HEENT:  Conjunctivae are clear.   PULMONARY:  Breathing comfortably on room air.   ABDOMEN:  No abdominal distention.   SKIN:  Examination today included the  scalp, face, neck, chest, abdomen, back, arms, legs, hands, feet, buttocks.  Skin exam was normal except for as follows:   -Extensive tan, flat-topped, waxy papules on the central back, anterior chest, bilateral dorsal arms.   -Light brown macules on the superior shoulders, arms, back.   -Scattered 3-4 mm medium brown macules on the arms, legs, feet, with reticular pigment network.   -1.5 mm dark brown macule on the left great toe.   -Left upper back with fleshy 4 mm pink papule.  -Linear scar on the L shoulder. No nodularity or pigmentation.   -No cervical, inguinal, axillary lymphad  -Gritty papules on the bilateral central cheeks, zygomas, temples, lateral cheeks        ASSESSMENT AND PLAN:   1.  Seborrheic keratoses, benign hyperkeratotic papules.  No treatment advised.      2.  Benign pigmented nevi.  No lesions of concern.  Darkest nevus on foot. Unchanged. Photos on 2/16/18     3.  History of malignant melanoma.  No signs of local recurrence.  No lymphadenopathy on physical exam today.  We will need skin checks every 6 months until 06/2022.      4. Aks : 12 lesions on the face treated with liquid nitrogen. Efudex suggested, patient declines.     5. Solar lentigines: Marker of past sun injury. Ongoing sun protection recommended         The patient to return in 6 months' time for repeat skin check.      Rosa Jaquez MD  Dermatology Staff         cc:   Karen Robbins MD   [facility]   [address] [place] [address]

## 2019-09-29 ENCOUNTER — HEALTH MAINTENANCE LETTER (OUTPATIENT)
Age: 59
End: 2019-09-29

## 2019-11-07 ENCOUNTER — TELEPHONE (OUTPATIENT)
Dept: FAMILY MEDICINE | Facility: CLINIC | Age: 59
End: 2019-11-07

## 2019-11-07 DIAGNOSIS — Z11.4 SCREENING FOR HIV (HUMAN IMMUNODEFICIENCY VIRUS): ICD-10-CM

## 2019-11-07 DIAGNOSIS — E78.2 MIXED HYPERLIPIDEMIA: Primary | ICD-10-CM

## 2019-11-07 NOTE — TELEPHONE ENCOUNTER
Reason for Call:  Other appointment    Detailed comments: Patient called to schedule annual physical.  He got soonest appointment available with Mark Chaudhary 12/5.  He would like to request that lab orders be put in ahead of time as he wants to schedule a lab appointment prior to his physical so he can go over the results in appointment on 12/5.  Please review and add in orders if able to do so.  Call patient to confirm if/when this is done so he can set up appointment with lab.     Phone Number Patient can be reached at: Home number on file [phone] (home)    Best Time: any    Can we leave a detailed message on this number? YES    Call taken on 11/7/2019 at 2:07 PM by Selene Silva

## 2019-11-08 NOTE — TELEPHONE ENCOUNTER
Patient requesting lab orders prior to appointment scheduled.    Next 5 appointments (look out 90 days)        Dec 05, 2019  1:20 PM CST  PHYSICAL with Todd Chaudhary PA-C  [facility] ([facility] [address] [facility] [address] [phone]            Future lab orders pended for provider to review and approve if appropriate.  Will notify patient once placed to assist in scheduling.  Ivonne DANGELO - Registered Nurse  [facility]  Acute and Diagnostic Services

## 2019-11-12 ENCOUNTER — OFFICE VISIT (OUTPATIENT)
Dept: DERMATOLOGY | Facility: CLINIC | Age: 59
End: 2019-11-12
Payer: COMMERCIAL

## 2019-11-12 DIAGNOSIS — L82.1 SK (SEBORRHEIC KERATOSIS): ICD-10-CM

## 2019-11-12 DIAGNOSIS — C43.62 MELANOMA OF LEFT UPPER ARM (H): ICD-10-CM

## 2019-11-12 DIAGNOSIS — L57.0 AK (ACTINIC KERATOSIS): Primary | ICD-10-CM

## 2019-11-12 PROCEDURE — 17003 DESTRUCT PREMALG LES 2-14: CPT | Performed by: DERMATOLOGY

## 2019-11-12 PROCEDURE — 17000 DESTRUCT PREMALG LESION: CPT | Performed by: DERMATOLOGY

## 2019-11-12 PROCEDURE — 99214 OFFICE O/P EST MOD 30 MIN: CPT | Mod: 25 | Performed by: DERMATOLOGY

## 2019-11-12 NOTE — PROGRESS NOTES
Dermatology Problem List:  1. Melanoma, L upper arm Breslow at least 2.6 mm, >1 mitosis, + ulceration. [place] node negative. Diagnosis 6/6/17  2. Benign pigmented nevi   3. Monitoring macule on the R dorsal toe  4. Aks - liquid nitrogen but 5FU recommended  5. Seborrheic keratoses  6. Tanning bed use    CHIEF COMPLAINT:  Melanoma skin check.       HISTORY OF PRESENT ILLNESS:  Mr. Ingram is a 59-year-old male presenting to Dermatology for a melanoma skin check. Last seen in 7/19.  No new or changing lesions. Was not due to be seen until 1/20, but patient was worried based on sun exposure this summer. No specific areas of concern.     Has toenails the curve under on the R 2nd toe. Interested in treatment.             Patient Active Problem List   Diagnosis     Mixed hyperlipidemia     Labral tear of shoulder- left; work related     CARDIOVASCULAR SCREENING; LDL GOAL LESS THAN 160     Pain in joint involving ankle and foot     Ankle joint stiffness     Right shoulder pain     Grief     AK (actinic keratosis)     Solar lentigo     Seborrheic keratosis     Melanoma of left upper arm (H)     SK (seborrheic keratosis)               Allergies   Allergen Reactions     Dust Mites         Nasal cavity tingles                 Current Outpatient Prescriptions   Medication     fluticasone (FLONASE) 50 MCG/ACT spray     Melatonin 10 MG TABS tablet     fish oil-omega-3 fatty acids (OMEGA 3) 1000 MG capsule     Multiple Vitamins-Minerals (ONE-A-DAY ENERGY) TABS     mometasone (ELOCON) 0.1 % lotion      No current facility-administered medications for this visit.              SOCIAL HISTORY:  The patient is .       FAMILY HISTORY:  No family history of skin cancer.       REVIEW OF SYSTEMS:  Feels well without additional skin concerns. No weight loss, lumps/bumps.        PHYSICAL EXAMINATION:   GENERAL:  The patient is a healthy-appearing 59-year-old male in no distress.   HEENT:  Conjunctivae are clear.   PULMONARY:  Breathing  comfortably on room air.   ABDOMEN:  No abdominal distention.   SKIN:  Examination today included the scalp, face, neck, chest, abdomen, back, arms, legs, hands, feet, buttocks, genital area.  Skin exam was normal except for as follows:   -Extensive tan, flat-topped, waxy papules on the central back, anterior chest, bilateral dorsal arms.   -Light brown macules on the superior shoulders, arms, back.   -Scattered 3-4 mm medium brown macules on the arms, legs, feet, with reticular pigment network.   -1.5 mm dark brown macule on the left great toe.   -Left upper back with fleshy 4 mm pink papule.  -Linear scar on the L shoulder. No nodularity or pigmentation.   -No cervical, inguinal, axillary lymphad  -Gritty papules on the bilateral central cheeks, R zygoma, R forehead  -Bilateral 2nd toenails overlying the distal tip of the toes without increase in the size of the nail plates.         ASSESSMENT AND PLAN:   1.  Seborrheic keratoses, benign hyperkeratotic papules.  No treatment advised.      2.  Benign pigmented nevi.  No lesions of concern.  Darkest nevus on foot. Unchanged. Photos on 2/16/18     3.  History of malignant melanoma.  No signs of local recurrence.  No lymphadenopathy on physical exam today.  We will need skin checks every 6 months until 06/2022.      4. Aks : 7 lesions on the face treated with liquid nitrogen. Efudex suggested, patient declines.     5. Solar lentigines: Marker of past sun injury. Ongoing sun protection recommended    6. Nail problems - anterior rotation of the bilateral 2nd toes with resultant toenails at the distal toe tip. Referral to podiatry to discuss.          The patient to return in 6 months' time for repeat skin check.      Rosa Jaquez MD  Dermatology Staff         cc:   Karen Robbins MD   [facility]   [address] [place] [address]

## 2019-11-12 NOTE — LETTER
11/12/2019      RE: Ronald Ingram  [address] [facility] [address]       Dermatology Problem List:  1. Melanoma, L upper arm Breslow at least 2.6 mm, >1 mitosis, + ulceration. [place] node negative. Diagnosis 6/6/17  2. Benign pigmented nevi   3. Monitoring macule on the R dorsal toe  4. Aks - liquid nitrogen but 5FU recommended  5. Seborrheic keratoses  6. Tanning bed use    CHIEF COMPLAINT:  Melanoma skin check.       HISTORY OF PRESENT ILLNESS:  Mr. Ingram is a 59-year-old male presenting to Dermatology for a melanoma skin check. Last seen in 7/19.  No new or changing lesions. Was not due to be seen until 1/20, but patient was worried based on sun exposure this summer. No specific areas of concern.     Has toenails the curve under on the R 2nd toe. Interested in treatment.             Patient Active Problem List   Diagnosis     Mixed hyperlipidemia     Labral tear of shoulder- left; work related     CARDIOVASCULAR SCREENING; LDL GOAL LESS THAN 160     Pain in joint involving ankle and foot     Ankle joint stiffness     Right shoulder pain     Grief     AK (actinic keratosis)     Solar lentigo     Seborrheic keratosis     Melanoma of left upper arm (H)     SK (seborrheic keratosis)               Allergies   Allergen Reactions     Dust Mites         Nasal cavity tingles                 Current Outpatient Prescriptions   Medication     fluticasone (FLONASE) 50 MCG/ACT spray     Melatonin 10 MG TABS tablet     fish oil-omega-3 fatty acids (OMEGA 3) 1000 MG capsule     Multiple Vitamins-Minerals (ONE-A-DAY ENERGY) TABS     mometasone (ELOCON) 0.1 % lotion      No current facility-administered medications for this visit.              SOCIAL HISTORY:  The patient is .       FAMILY HISTORY:  No family history of skin cancer.       REVIEW OF SYSTEMS:  Feels well without additional skin concerns. No weight loss, lumps/bumps.        PHYSICAL EXAMINATION:   GENERAL:  The patient is a healthy-appearing  59-year-old male in no distress.   HEENT:  Conjunctivae are clear.   PULMONARY:  Breathing comfortably on room air.   ABDOMEN:  No abdominal distention.   SKIN:  Examination today included the scalp, face, neck, chest, abdomen, back, arms, legs, hands, feet, buttocks, genital area.  Skin exam was normal except for as follows:   -Extensive tan, flat-topped, waxy papules on the central back, anterior chest, bilateral dorsal arms.   -Light brown macules on the superior shoulders, arms, back.   -Scattered 3-4 mm medium brown macules on the arms, legs, feet, with reticular pigment network.   -1.5 mm dark brown macule on the left great toe.   -Left upper back with fleshy 4 mm pink papule.  -Linear scar on the L shoulder. No nodularity or pigmentation.   -No cervical, inguinal, axillary lymphad  -Gritty papules on the bilateral central cheeks, R zygoma, R forehead  -Bilateral 2nd toenails overlying the distal tip of the toes without increase in the size of the nail plates.         ASSESSMENT AND PLAN:   1.  Seborrheic keratoses, benign hyperkeratotic papules.  No treatment advised.      2.  Benign pigmented nevi.  No lesions of concern.  Darkest nevus on foot. Unchanged. Photos on 2/16/18     3.  History of malignant melanoma.  No signs of local recurrence.  No lymphadenopathy on physical exam today.  We will need skin checks every 6 months until 06/2022.      4. Aks : 7 lesions on the face treated with liquid nitrogen. Efudex suggested, patient declines.     5. Solar lentigines: Marker of past sun injury. Ongoing sun protection recommended    6. Nail problems - anterior rotation of the bilateral 2nd toes with resultant toenails at the distal toe tip. Referral to podiatry to discuss.          The patient to return in 6 months' time for repeat skin check.      Rosa Jaquez MD  Dermatology Staff         cc:   Karen Robbins MD   [facility]   [address] [place] [address]

## 2019-12-03 NOTE — PATIENT INSTRUCTIONS
Preventive Health Recommendations  Male Ages 50 - 64    Yearly exam:             See your health care provider every year in order to  o   Review health changes.   o   Discuss preventive care.    o   Review your medicines if your doctor has prescribed any.     Have a cholesterol test every 5 years, or more frequently if you are at risk for high cholesterol/heart disease.     Have a diabetes test (fasting glucose) every three years. If you are at risk for diabetes, you should have this test more often.     Have a colonoscopy at age 50, or have a yearly FIT test (stool test). These exams will check for colon cancer.      Talk with your health care provider about whether or not a prostate cancer screening test (PSA) is right for you.    You should be tested each year for STDs (sexually transmitted diseases), if you re at risk.     Shots: Get a flu shot each year. Get a tetanus shot every 10 years.     Nutrition:    Eat at least 5 servings of fruits and vegetables daily.     Eat whole-grain bread, whole-wheat pasta and brown rice instead of white grains and rice.     Get adequate Calcium and Vitamin D.     Lifestyle    Exercise for at least 150 minutes a week (30 minutes a day, 5 days a week). This will help you control your weight and prevent disease.     Limit alcohol to one drink per day.     No smoking.     Wear sunscreen to prevent skin cancer.     See your dentist every six months for an exam and cleaning.     See your eye doctor every 1 to 2 years.

## 2019-12-03 NOTE — PROGRESS NOTES
"SUBJECTIVE:   CC: Ronald Ingram is an 59 year old male who presents for preventative health visit.     Healthy Habits:     Getting at least 3 servings of Calcium per day:  Yes    Bi-annual eye exam:  NO    Dental care twice a year:  Yes    Sleep apnea or symptoms of sleep apnea:  None    Diet:  Regular (no restrictions)    Frequency of exercise:  2-3 days/week    Duration of exercise:  15-30 minutes    Taking medications regularly:  Yes    Medication side effects:  None    PHQ-2 Total Score: 1    Additional concerns today:  Yes (toe/foot/finger problem, med discussion )    Ronald Ingram is a 59 year old male who presents today for annual physical  LABS yesterday; to review and update  He has a list of symptoms to discuss today    1. Viagra - getting from a company in [place]; No problems from this or side effects; it is effective; wondering if safe  2. Wondering about adding another \"advanced fish oil pill\"  3. Pinky - it seems to lock into place; often when he wakes up; irritating to reduce  4. 2nd digit on right foot can be painful at times in certain shoes, more when at the club  5. Left foot tingling off and on; will happen 3 days in a row; denies any low back pain; never sharp and shooting from buttock/lumbar back        Today's PHQ-2 Score:   PHQ-2 ( 1999 Pf[employer]) 12/5/2019   Q1: Little interest or pleasure in doing things 0   Q2: Feeling down, depressed or hopeless 1   PHQ-2 Score 1   Q1: Little interest or pleasure in doing things Not at all   Q2: Feeling down, depressed or hopeless Several days   PHQ-2 Score 1       Abuse: Current or Past(Physical, Sexual or Emotional)- No  Do you feel safe in your environment? Yes      Social History     Tobacco Use     Smoking status: Never Smoker     Smokeless tobacco: Never Used   Substance Use Topics     Alcohol use: Yes     Comment: rarely       Alcohol Use 12/5/2019   Prescreen: >3 drinks/day or >7 drinks/week? No   Prescreen: >3 drinks/day or >7 drinks/week? -     Last " "PSA:   PSA   Date Value Ref Range Status   02/15/2012 0.37 0 - 4 ug/L Final       Reviewed orders with patient. Reviewed health maintenance and updated orders accordingly - Yes  Lab work is in process    Reviewed and updated as needed this visit by clinical staff  Tobacco  Allergies  Meds  Med Hx  Surg Hx  Fam Hx  Soc Hx        Reviewed and updated as needed this visit by Provider            Review of Systems   Constitutional: Negative for chills and fever.   HENT: Negative for congestion, ear pain and sore throat. Hearing loss: not severe; mostly in restaraunt.    Eyes: Positive for visual disturbance. Negative for pain.   Respiratory: Positive for shortness of breath (rare; not during exercise; sometimes worse in the winter time). Negative for cough.    Cardiovascular: Negative for chest pain, palpitations and peripheral edema.   Gastrointestinal: Positive for constipation (off/on) and heartburn. Negative for abdominal pain, diarrhea, hematochezia and nausea.   Genitourinary: Negative for discharge, dysuria, frequency, genital sores, hematuria, impotence and urgency.   Musculoskeletal: Negative for arthralgias, joint swelling and myalgias.   Skin: Negative for rash.   Neurological: Negative for dizziness, weakness, headaches and paresthesias.   Psychiatric/Behavioral: Positive for mood changes (not completely interfering with daily life). The patient is nervous/anxious (stable).        OBJECTIVE:   /62   Pulse 74   Temp 97  F (36.1  C) (Tympanic)   Resp 16   Ht 1.88 m (6' 2\")   Wt 104.9 kg (231 lb 3.2 oz)   SpO2 97%   BMI 29.68 kg/m      Physical Exam  GENERAL: healthy, alert and no distress  EYES: Eyes grossly normal to inspection, PERRL and conjunctivae and sclerae normal  HENT: ear canals and TM's normal, nose and mouth without ulcers or lesions  NECK: no adenopathy and thyroid normal to palpation  RESP: lungs clear to auscultation - no rales, rhonchi or wheezes  CV: regular rates and rhythm " without ectopy  ABDOMEN: soft, nontender, no hepatosplenomegaly, no masses and bowel sounds normal   (male): normal male genitalia without lesions or urethral discharge, no hernia  MS: there is no overt irritation of the #2 right toe distally. No gross nail change. No swelling or erythema. The right 5th hand digit does not lock on  today. I do not feel a palpable cord over the dorsal aspect of the digit or palmar space. There is no low back tenderness. Neg SLR. Distal reflex wnl.  SKIN: seeing derm  NEURO: Normal strength and tone, mentation intact and speech normal  PSYCH: mentation appears normal, affect normal/bright    Diagnostic Test Results:  Labs reviewed in Epic  Will update today    ASSESSMENT/PLAN:   1. Routine general medical examination at a health care facility  Reviewed personal and family history. Reviewed age appropriate screenings. Recommended any needed vaccinations.  Reviewed his cholesterol labs - he prefers to work more on exercise and reconsider statin down the road. Reviewed risks of elevated cholesterol. REviewed that his medication from [place] I cannot be certain about as to the safety. He will continue at his own risk.   - HIV Antigen Antibody Combo    2. Screening for prostate cancer  We will update today. He is asymptomatic. Not screened since 2012  - Prostate spec antigen screen    3. Melanoma of left upper arm (H)  4. Metastatic malignant melanoma (H)  Continue follow up with derm. No new concerns    5. Pain of toe of right foot  I suspect this is from simple irritation/abrasion. Recommend wider box shoes/toe box. Can see podiatry as needed    6. Trigger finger, acquired  This is only locking in the mornings at this time. Little pain. If symptoms worsen or become more regular, will have him see ortho  - ORTHO  REFERRAL    7. Numbness and tingling of foot  He did mention this previously. It improved for sometime but has returned. He denies weakness. SLR was negative. There  "is some loss of space and arthritic change to spine but no acute fracture. Await radiology interpretation. May recommend initially trying physical therapy and if not improving sending to ortho  - XR Lumbar Spine 2/3 Views; Future    8. Elevated cholesterol  Continue for now. He will reconsider statin after exercise  The 10-year ASCVD risk score (Beckie VIEIRA Jr., et al., 2013) is: 9.1%    Values used to calculate the score:      Age: 59 years      Sex: Male      Is Non- : No      Diabetic: No      Tobacco smoker: No      Systolic Blood Pressure: 110 mmHg      Is BP treated: No      HDL Cholesterol: 33 mg/dL      Total Cholesterol: 218 mg/dL  - fenofibrate (TRIGLIDE/LOFIBRA) 160 MG tablet; Take 1 tablet (160 mg) by mouth daily  Dispense: 90 tablet; Refill: 3    9. Primary insomnia  Continue present management.   - traZODone (DESYREL) 50 MG tablet; Take 0.5-1 tablets (25-50 mg) by mouth nightly as needed for sleep  Dispense: 90 tablet; Refill: 3    COUNSELING:   Reviewed preventive health counseling, as reflected in patient instructions    Estimated body mass index is 29.68 kg/m  as calculated from the following:    Height as of this encounter: 1.88 m (6' 2\").    Weight as of this encounter: 104.9 kg (231 lb 3.2 oz).     Weight management plan: Discussed healthy diet and exercise guidelines     reports that he has never smoked. He has never used smokeless tobacco.      Counseling Resources:  ATP IV Guidelines  Pooled Cohorts Equation Calculator  FRAX Risk Assessment  ICSI Preventive Guidelines  Dietary Guidelines for Americans, 2010  USDA's MyPlate  ASA Prophylaxis  Lung CA Screening    Todd Chaudhary PA-C  [facility]  "

## 2019-12-04 DIAGNOSIS — E78.2 MIXED HYPERLIPIDEMIA: ICD-10-CM

## 2019-12-04 PROCEDURE — G0103 PSA SCREENING: HCPCS | Performed by: PHYSICIAN ASSISTANT

## 2019-12-04 PROCEDURE — 87389 HIV-1 AG W/HIV-1&-2 AB AG IA: CPT | Performed by: PHYSICIAN ASSISTANT

## 2019-12-04 PROCEDURE — 80053 COMPREHEN METABOLIC PANEL: CPT | Performed by: INTERNAL MEDICINE

## 2019-12-04 PROCEDURE — 80061 LIPID PANEL: CPT | Performed by: INTERNAL MEDICINE

## 2019-12-04 PROCEDURE — 36415 COLL VENOUS BLD VENIPUNCTURE: CPT | Performed by: INTERNAL MEDICINE

## 2019-12-05 ENCOUNTER — ANCILLARY PROCEDURE (OUTPATIENT)
Dept: GENERAL RADIOLOGY | Facility: CLINIC | Age: 59
End: 2019-12-05
Attending: PHYSICIAN ASSISTANT
Payer: COMMERCIAL

## 2019-12-05 ENCOUNTER — OFFICE VISIT (OUTPATIENT)
Dept: FAMILY MEDICINE | Facility: CLINIC | Age: 59
End: 2019-12-05
Payer: COMMERCIAL

## 2019-12-05 VITALS
SYSTOLIC BLOOD PRESSURE: 110 MMHG | RESPIRATION RATE: 16 BRPM | BODY MASS INDEX: 29.67 KG/M2 | DIASTOLIC BLOOD PRESSURE: 62 MMHG | OXYGEN SATURATION: 97 % | HEART RATE: 74 BPM | WEIGHT: 231.2 LBS | TEMPERATURE: 97 F | HEIGHT: 74 IN

## 2019-12-05 DIAGNOSIS — C43.62 MELANOMA OF LEFT UPPER ARM (H): ICD-10-CM

## 2019-12-05 DIAGNOSIS — R20.0 NUMBNESS AND TINGLING OF FOOT: ICD-10-CM

## 2019-12-05 DIAGNOSIS — Z00.00 ROUTINE GENERAL MEDICAL EXAMINATION AT A HEALTH CARE FACILITY: Primary | ICD-10-CM

## 2019-12-05 DIAGNOSIS — F51.01 PRIMARY INSOMNIA: ICD-10-CM

## 2019-12-05 DIAGNOSIS — C43.9 METASTATIC MALIGNANT MELANOMA (H): ICD-10-CM

## 2019-12-05 DIAGNOSIS — R20.2 NUMBNESS AND TINGLING OF FOOT: ICD-10-CM

## 2019-12-05 DIAGNOSIS — Z12.5 SCREENING FOR PROSTATE CANCER: ICD-10-CM

## 2019-12-05 DIAGNOSIS — M79.674 PAIN OF TOE OF RIGHT FOOT: ICD-10-CM

## 2019-12-05 DIAGNOSIS — E78.00 ELEVATED CHOLESTEROL: ICD-10-CM

## 2019-12-05 DIAGNOSIS — M65.30 TRIGGER FINGER, ACQUIRED: ICD-10-CM

## 2019-12-05 LAB
ALBUMIN SERPL-MCNC: 4.1 G/DL (ref 3.4–5)
ALP SERPL-CCNC: 46 U/L (ref 40–150)
ALT SERPL W P-5'-P-CCNC: 31 U/L (ref 0–70)
ANION GAP SERPL CALCULATED.3IONS-SCNC: 6 MMOL/L (ref 3–14)
AST SERPL W P-5'-P-CCNC: 19 U/L (ref 0–45)
BILIRUB SERPL-MCNC: 0.6 MG/DL (ref 0.2–1.3)
BUN SERPL-MCNC: 18 MG/DL (ref 7–30)
CALCIUM SERPL-MCNC: 9.2 MG/DL (ref 8.5–10.1)
CHLORIDE SERPL-SCNC: 105 MMOL/L (ref 94–109)
CHOLEST SERPL-MCNC: 218 MG/DL
CO2 SERPL-SCNC: 26 MMOL/L (ref 20–32)
CREAT SERPL-MCNC: 1.21 MG/DL (ref 0.66–1.25)
GFR SERPL CREATININE-BSD FRML MDRD: 65 ML/MIN/{1.73_M2}
GLUCOSE SERPL-MCNC: 93 MG/DL (ref 70–99)
HDLC SERPL-MCNC: 33 MG/DL
LDLC SERPL CALC-MCNC: 138 MG/DL
NONHDLC SERPL-MCNC: 185 MG/DL
POTASSIUM SERPL-SCNC: 4.2 MMOL/L (ref 3.4–5.3)
PROT SERPL-MCNC: 7.6 G/DL (ref 6.8–8.8)
PSA SERPL-ACNC: 0.31 UG/L (ref 0–4)
SODIUM SERPL-SCNC: 137 MMOL/L (ref 133–144)
TRIGL SERPL-MCNC: 234 MG/DL

## 2019-12-05 PROCEDURE — 99396 PREV VISIT EST AGE 40-64: CPT | Performed by: PHYSICIAN ASSISTANT

## 2019-12-05 PROCEDURE — 72100 X-RAY EXAM L-S SPINE 2/3 VWS: CPT | Mod: FY

## 2019-12-05 PROCEDURE — 99214 OFFICE O/P EST MOD 30 MIN: CPT | Mod: 25 | Performed by: PHYSICIAN ASSISTANT

## 2019-12-05 RX ORDER — TRAZODONE HYDROCHLORIDE 50 MG/1
25-50 TABLET, FILM COATED ORAL
Qty: 90 TABLET | Refills: 3 | Status: SHIPPED | OUTPATIENT
Start: 2019-12-05 | End: 2021-01-11

## 2019-12-05 RX ORDER — FENOFIBRATE 160 MG/1
160 TABLET ORAL DAILY
Qty: 90 TABLET | Refills: 3 | Status: SHIPPED | OUTPATIENT
Start: 2019-12-05 | End: 2021-03-22

## 2019-12-05 ASSESSMENT — ENCOUNTER SYMPTOMS
PALPITATIONS: 0
HEMATURIA: 0
HEARTBURN: 1
CHILLS: 0
HEMATOCHEZIA: 0
SHORTNESS OF BREATH: 1
JOINT SWELLING: 0
NAUSEA: 0
WEAKNESS: 0
EYE PAIN: 0
HEADACHES: 0
ABDOMINAL PAIN: 0
NERVOUS/ANXIOUS: 1
DIZZINESS: 0
ARTHRALGIAS: 0
FEVER: 0
COUGH: 0
PARESTHESIAS: 0
DIARRHEA: 0
CONSTIPATION: 1
MYALGIAS: 0
FREQUENCY: 0
DYSURIA: 0
SORE THROAT: 0

## 2019-12-05 ASSESSMENT — MIFFLIN-ST. JEOR: SCORE: 1933.47

## 2019-12-06 PROBLEM — C43.9 METASTATIC MALIGNANT MELANOMA (H): Status: ACTIVE | Noted: 2019-12-06

## 2019-12-06 LAB — HIV 1+2 AB+HIV1 P24 AG SERPL QL IA: NONREACTIVE

## 2020-07-28 ENCOUNTER — OFFICE VISIT (OUTPATIENT)
Dept: DERMATOLOGY | Facility: CLINIC | Age: 60
End: 2020-07-28
Payer: COMMERCIAL

## 2020-07-28 DIAGNOSIS — L82.1 VERRUCOUS KERATOSIS: ICD-10-CM

## 2020-07-28 DIAGNOSIS — L57.0 AK (ACTINIC KERATOSIS): Primary | ICD-10-CM

## 2020-07-28 DIAGNOSIS — Z20.822 SUSPECTED 2019 NOVEL CORONAVIRUS INFECTION: ICD-10-CM

## 2020-07-28 DIAGNOSIS — D22.9 MULTIPLE NEVI: ICD-10-CM

## 2020-07-28 DIAGNOSIS — C43.62 MELANOMA OF LEFT UPPER ARM (H): ICD-10-CM

## 2020-07-28 DIAGNOSIS — L82.1 SEBORRHEIC KERATOSIS: ICD-10-CM

## 2020-07-28 PROCEDURE — 17003 DESTRUCT PREMALG LES 2-14: CPT | Mod: 59 | Performed by: DERMATOLOGY

## 2020-07-28 PROCEDURE — 99214 OFFICE O/P EST MOD 30 MIN: CPT | Mod: 25 | Performed by: DERMATOLOGY

## 2020-07-28 PROCEDURE — 17000 DESTRUCT PREMALG LESION: CPT | Mod: 59 | Performed by: DERMATOLOGY

## 2020-07-28 PROCEDURE — 17110 DESTRUCTION B9 LES UP TO 14: CPT | Performed by: DERMATOLOGY

## 2020-07-28 RX ORDER — LIDOCAINE HYDROCHLORIDE AND EPINEPHRINE 10; 10 MG/ML; UG/ML
3 INJECTION, SOLUTION INFILTRATION; PERINEURAL ONCE
Status: ACTIVE | OUTPATIENT
Start: 2020-07-28

## 2020-07-28 NOTE — PROGRESS NOTES
cDermatology Problem List:  1. Melanoma, L upper arm Breslow at least 2.6 mm, >1 mitosis, + ulceration. [place] node negative. Diagnosis 6/6/17  2. Benign pigmented nevi   3. Monitoring macule on the R dorsal toe  4. Aks - liquid nitrogen but 5FU recommended  5. Seborrheic keratoses  6. Tanning bed use    CHIEF COMPLAINT:  Melanoma skin check.       HISTORY OF PRESENT ILLNESS:  Mr. Ingram is a 59-year-old male presenting to Dermatology for a melanoma skin check. Last seen in 11/19.  No new or changing lesions. He notes scaling on the face and a new growth on the R mid cheek. No tenderness or bleeding.     Notes that he had symptoms of fever, cough, myalgias in March and asks if he can have COVID ab testing.           Patient Active Problem List   Diagnosis     Mixed hyperlipidemia     Labral tear of shoulder- left; work related     CARDIOVASCULAR SCREENING; LDL GOAL LESS THAN 160     Pain in joint involving ankle and foot     Ankle joint stiffness     Right shoulder pain     Grief     AK (actinic keratosis)     Solar lentigo     Seborrheic keratosis     Melanoma of left upper arm (H)     SK (seborrheic keratosis)               Allergies   Allergen Reactions     Dust Mites         Nasal cavity tingles                 Current Outpatient Prescriptions   Medication     fluticasone (FLONASE) 50 MCG/ACT spray     Melatonin 10 MG TABS tablet     fish oil-omega-3 fatty acids (OMEGA 3) 1000 MG capsule     Multiple Vitamins-Minerals (ONE-A-DAY ENERGY) TABS     mometasone (ELOCON) 0.1 % lotion      No current facility-administered medications for this visit.              SOCIAL HISTORY:  The patient is .       FAMILY HISTORY:  No family history of skin cancer.       REVIEW OF SYSTEMS:  Feels well without additional skin concerns. No weight loss, lumps/bumps.  No current fever, cough, rhinorrhea, GI distress.       PHYSICAL EXAMINATION:   GENERAL:  The patient is a healthy-appearing 59-year-old male in no distress.    HEENT:  Conjunctivae are clear.   PULMONARY:  Breathing comfortably on room air.   ABDOMEN:  No abdominal distention.   SKIN:  Examination today included the scalp, face, neck, chest, abdomen, back, arms, legs, hands, feet, buttocks, genital area.  Skin exam was normal except for as follows:   -Extensive tan, flat-topped, waxy papules on the central back, anterior chest, bilateral dorsal arms.   -Light brown macules on the superior shoulders, arms, back.   -Scattered 3-4 mm medium brown macules on the arms, legs, feet, with reticular pigment network.   -1.5 mm dark brown macule on the left great toe.   -Left upper back with fleshy 4 mm pink papule.  -Linear scar on the L shoulder. No nodularity or pigmentation.   -No cervical, inguinal, axillary lymphad  -Gritty papules on the bilateral central cheeks, R zygoma, R forehead, helices, temples for 9 lesions  -Waxy verrucous papule on the R mid cheek  -Pink papule on the R thigh with overlying erosion      ASSESSMENT AND PLAN:   1.  Seborrheic keratoses, benign hyperkeratotic papules.  No treatment advised.      2.  Benign pigmented nevi.  No lesions of concern.  Darkest nevus on foot. Unchanged. Photos on 2/16/18     3.  History of malignant melanoma.  No signs of local recurrence.  No lymphadenopathy on physical exam today.  We will need skin checks every 6 months until 06/2022.      4. Aks : 9 lesions on the face treated with liquid nitrogen. Efudex suggested, patient declines.     5. Solar lentigines: Marker of past sun injury. Ongoing sun protection recommended    6. Suspected verrucous keratosis on the R cheek. Treated with liquid nitrogen. Would biopsy if lesion fails to resolve in 2 weeks.       The patient to return in 6 months' time for repeat skin check.      Rosa Jaquez MD  Dermatology Staff         cc:   Karen Robbins MD   [facility]   [address] [place] [address]

## 2020-07-28 NOTE — PATIENT INSTRUCTIONS
Pediatric Dermatology  [facility]  [address]  1st [place]  [address]  Phone: [phone]    Pediatric & Adult Dermatology  [facility]  [address] [facility]   2nd Floor  [facility] [address]  [phone]                  General information: Dr. Rosa Jaquez is a board-certified dermatologist with subspecialty certification in pediatric dermatology.     Scheduling and Nurse Triage: Dr. Jaquez sees pediatric patients on Mondays in [place] and adult and pediatric patients on Tuesdays in [place]. The remainder of the week she practices at the [facility]. Please call the above phone numbers to schedule or to talk to a nurse.     -For scheduling at the [place] or [place] locations, or to talk to the triage nurse please call the above phone number at the clinic where you were seen.     -For medication refills, please call your pharmacy.

## 2020-07-28 NOTE — LETTER
7/28/2020      RE: Ronald Ingram  [address]  [place] [id number]       cDermatology Problem List:  1. Melanoma, L upper arm Breslow at least 2.6 mm, >1 mitosis, + ulceration. [place] node negative. Diagnosis 6/6/17  2. Benign pigmented nevi   3. Monitoring macule on the R dorsal toe  4. Aks - liquid nitrogen but 5FU recommended  5. Seborrheic keratoses  6. Tanning bed use    CHIEF COMPLAINT:  Melanoma skin check.       HISTORY OF PRESENT ILLNESS:  Mr. Ingrma is a 59-year-old male presenting to Dermatology for a melanoma skin check. Last seen in 11/19.  No new or changing lesions. He notes scaling on the face and a new growth on the R mid cheek. No tenderness or bleeding.     Notes that he had symptoms of fever, cough, myalgias in March and asks if he can have COVID ab testing.           Patient Active Problem List   Diagnosis     Mixed hyperlipidemia     Labral tear of shoulder- left; work related     CARDIOVASCULAR SCREENING; LDL GOAL LESS THAN 160     Pain in joint involving ankle and foot     Ankle joint stiffness     Right shoulder pain     Grief     AK (actinic keratosis)     Solar lentigo     Seborrheic keratosis     Melanoma of left upper arm (H)     SK (seborrheic keratosis)               Allergies   Allergen Reactions     Dust Mites         Nasal cavity tingles                 Current Outpatient Prescriptions   Medication     fluticasone (FLONASE) 50 MCG/ACT spray     Melatonin 10 MG TABS tablet     fish oil-omega-3 fatty acids (OMEGA 3) 1000 MG capsule     Multiple Vitamins-Minerals (ONE-A-DAY ENERGY) TABS     mometasone (ELOCON) 0.1 % lotion      No current facility-administered medications for this visit.              SOCIAL HISTORY:  The patient is .       FAMILY HISTORY:  No family history of skin cancer.       REVIEW OF SYSTEMS:  Feels well without additional skin concerns. No weight loss, lumps/bumps.  No current fever, cough, rhinorrhea, GI distress.       PHYSICAL EXAMINATION:    GENERAL:  The patient is a healthy-appearing 59-year-old male in no distress.   HEENT:  Conjunctivae are clear.   PULMONARY:  Breathing comfortably on room air.   ABDOMEN:  No abdominal distention.   SKIN:  Examination today included the scalp, face, neck, chest, abdomen, back, arms, legs, hands, feet, buttocks, genital area.  Skin exam was normal except for as follows:   -Extensive tan, flat-topped, waxy papules on the central back, anterior chest, bilateral dorsal arms.   -Light brown macules on the superior shoulders, arms, back.   -Scattered 3-4 mm medium brown macules on the arms, legs, feet, with reticular pigment network.   -1.5 mm dark brown macule on the left great toe.   -Left upper back with fleshy 4 mm pink papule.  -Linear scar on the L shoulder. No nodularity or pigmentation.   -No cervical, inguinal, axillary lymphad  -Gritty papules on the bilateral central cheeks, R zygoma, R forehead, helices, temples for 9 lesions  -Waxy verrucous papule on the R mid cheek  -Pink papule on the R thigh with overlying erosion      ASSESSMENT AND PLAN:   1.  Seborrheic keratoses, benign hyperkeratotic papules.  No treatment advised.      2.  Benign pigmented nevi.  No lesions of concern.  Darkest nevus on foot. Unchanged. Photos on 2/16/18     3.  History of malignant melanoma.  No signs of local recurrence.  No lymphadenopathy on physical exam today.  We will need skin checks every 6 months until 06/2022.      4. Aks : 9 lesions on the face treated with liquid nitrogen. Efudex suggested, patient declines.     5. Solar lentigines: Marker of past sun injury. Ongoing sun protection recommended    6. Suspected verrucous keratosis on the R cheek. Treated with liquid nitrogen. Would biopsy if lesion fails to resolve in 2 weeks.       The patient to return in 6 months' time for repeat skin check.      Rosa Jaquez MD  Dermatology Staff         cc:   Karen Robbins MD   [facility]   [address] [place] [address]       Rosa Jaquez MD

## 2020-09-28 ENCOUNTER — VIRTUAL VISIT (OUTPATIENT)
Dept: FAMILY MEDICINE | Facility: CLINIC | Age: 60
End: 2020-09-28
Payer: COMMERCIAL

## 2020-09-28 DIAGNOSIS — E78.2 MIXED HYPERLIPIDEMIA: ICD-10-CM

## 2020-09-28 DIAGNOSIS — R10.30 LOWER ABDOMINAL PAIN: ICD-10-CM

## 2020-09-28 DIAGNOSIS — R07.0 THROAT DISCOMFORT: ICD-10-CM

## 2020-09-28 DIAGNOSIS — N52.9 ERECTILE DYSFUNCTION, UNSPECIFIED ERECTILE DYSFUNCTION TYPE: ICD-10-CM

## 2020-09-28 DIAGNOSIS — R20.0 NUMBNESS AND TINGLING OF FOOT: Primary | ICD-10-CM

## 2020-09-28 DIAGNOSIS — R20.2 NUMBNESS AND TINGLING OF FOOT: Primary | ICD-10-CM

## 2020-09-28 DIAGNOSIS — K59.00 CONSTIPATION, UNSPECIFIED CONSTIPATION TYPE: ICD-10-CM

## 2020-09-28 PROCEDURE — 99214 OFFICE O/P EST MOD 30 MIN: CPT | Mod: 95 | Performed by: PHYSICIAN ASSISTANT

## 2020-09-28 RX ORDER — SILDENAFIL 100 MG/1
50-100 TABLET, FILM COATED ORAL DAILY PRN
Qty: 12 TABLET | Refills: 1 | Status: SHIPPED | OUTPATIENT
Start: 2020-09-28

## 2020-09-28 NOTE — PATIENT INSTRUCTIONS
Come to clinic for lab appointment.    For ongoing foot numbness, will check labs. If labs normal, consider follow-up with podiatry or ortho.    For throat discomfort, recommend trial of prilosec (omeprazole) 20 mg daily for 2 weeks to see if this helps. Follow-up if no improvement or worsening.    For abdominal discomfort and constipation, recommend:  1. Consume at least 8 glasses of water per day.  2. Exercise for at least 30 minutes every day. Regular exercise helps to keep your digestive system active and and healthy and may help with constipation.   3. Increase dietary fiber. Goal of 25 grams per day for women, 35 grams per day for men. If unable to consume 25-35 grams through diet alone consider OTC supplements such as Benefiber, FiberCon, Metamucil, or Citrucel.   4. Try taking Miralax (17 grams = 1 scoop). Take once daily, can mix with anything. If you experience increasing bowel movements and diarrhea, decrease to every other day or every 3rd day. Miralax is an osmotic laxative that increases the amount of water secreted by the intestines resulting in softer and easier to pass stools. You can also try using less than 1 scoop on a daily basis.  5. You can also try stimulant laxatives such as Senna, Ex Lax or Dulcolax. Stimulant laxatives speed up the colonic motility of your gut helping to induce a bowel movement. Take as needed at bedtime.   6. If you are still having difficulties with constipation may also add a stool softener to your bowel regimen such as Docusate.

## 2020-09-28 NOTE — PROGRESS NOTES
"Ronald Ingram is a 59 year old male who is being evaluated via a billable video visit.      The patient has been notified of following:     \"This video visit will be conducted via a call between you and your physician/provider. We have found that certain health care needs can be provided without the need for an in-person physical exam.  This service lets us provide the care you need with a video conversation.  If a prescription is necessary we can send it directly to your pharmacy.  If lab work is needed we can place an order for that and you can then stop by our lab to have the test done at a later time.    Video visits are billed at different rates depending on your insurance coverage.  Please reach out to your insurance provider with any questions.    If during the course of the call the physician/provider feels a video visit is not appropriate, you will not be charged for this service.\"    Patient has given verbal consent for Video visit? Yes  How would you like to obtain your AVS? MyChart  If you are dropped from the video visit, the video invite should be resent to: Send to e-mail at: [email or web address]  Will anyone else be joining your video visit? No    Subjective     Ronald Ingram is a 59 year old male who presents today via video visit for the following health issues:    HPI    Recheck, f/u on labs and burning sensation in intestines.    Numbness/tingling in both feet. Ongoing for quite some time but worse over past few months. Notices numbness on bottom of both feet, constant. Nothing makes better or worse. Also has tingling on sides of feet and into the top of his feet intermittently. Reports pain on the outside of his left foot but no other foot pain. No foot swelling, sores, weakness. No back pain or leg pain. No new shoes or changes to activity. Dad with history of diabetes. No personal history of diabetes. He mentioned this concern at his physical in 12/2019, lumbar spine x-ray showed some loss of " space and arthritic changes but no acute fracture.    Requesting prescription of viagra. History of erectile dysfunction. Has used viagra in the past. Previously prescribed but was very expensive so has been getting through online source. Uses once per week on average. Sometimes has flushing in his face but no other side effects with this. Does not take nitrates or alpha-blocking antihypertensive agents.    Also has concerns about a mild burning sensation in the low abdomen below the belly button for the past month. States discomfort has been persistent though denies pain currently. Nothing specifically makes better or worse. No nausea, vomiting. No fever but sometimes feels more cold than normal. Has been constipated over the past month, averaging about 1 bowel movement every 3 days which is unusual for him. No blood or black stools. Drinks 3 glasses of water daily (glass appears to be maybe 12-16 oz). Eats some fruits and vegetables. No history of constipation. No urinary symptoms. He has also been feeling fatigued. Normal appetite. Colonoscopy in 2012 showed diverticulosis, follow-up for screening in 10 years recommended.    Also has had a mild sore throat for the past month. Notices more on the left side of his throat. Pain seems to get worse throughout the day but still mild. No pain with swallowing or difficulty with swallowing. Eating and drinking normally. No swollen glands on his neck. Back of throat maybe looks a little red but no white patches. Cough drops have helped some. No nasal congestion, post nasal drainage, cough. Does report having some heartburn symptoms and takes prilosec maybe once every 3 or 4 days.    Video Start Time: 1:00 PM      Review of Systems   Constitutional, HEENT, cardiovascular, pulmonary, gi and gu systems are negative, except as otherwise noted.      Objective           Vitals:  No vitals were obtained today due to virtual visit.    Physical Exam     GENERAL: Healthy, alert and  no distress  EYES: Eyes grossly normal to inspection.  No discharge or erythema, or obvious scleral/conjunctival abnormalities.  RESP: No audible wheeze, cough, or visible cyanosis.  No visible retractions or increased work of breathing.    SKIN: Visible skin clear. No significant rash, abnormal pigmentation or lesions.  NEURO: Cranial nerves grossly intact.  Mentation and speech appropriate for age.  PSYCH: Mentation appears normal, affect normal/bright, judgement and insight intact, normal speech and appearance well-groomed.          Assessment & Plan     Numbness and tingling of foot  Ongoing issue but seems more persistent over past few months in both feet. No history of diabetes. No back pain. Had lumbar spine x-ray in 12/2019 which showed arthritic change but no fracture. Will check labs. Discussed if normal but persistent symptoms, consider follow-up with podiatry or ortho.  - **A1C FUTURE anytime; Future  - **Comprehensive metabolic panel FUTURE anytime; Future  - CBC with platelets; Future  - Vitamin B12; Future  - Folate; Future    Mixed hyperlipidemia  Chronic issue, will recheck  - Lipid panel reflex to direct LDL Fasting; Future    Throat discomfort  Mild left sided throat discomfort for past month. No pain or difficulty with swallowing. Eating and drinking normally. No swollen glands. No fever. No nasal congestion, postnasal drainage, allergy symptoms. Has had some heartburn. Recommend trial of prilosec 20 mg daily for 2 weeks to see if this helps with throat discomfort. If no improvement or worsening, follow-up in clinic.    Lower abdominal pain  Mild burning in low abdomen for past month. No nausea, vomiting, fever. Has been constipated over this timeframe as well. No blood in stool. Recommend increased fluids, fiber. Miralax. Follow-up if no improvement or worsening.  - **Comprehensive metabolic panel FUTURE anytime; Future    Constipation, unspecified constipation type  See above. Follow-up if no  "improvement or worsening. Colonoscopy in 2012 showed diverticulosis, follow-up for screening in 10 years recommended.  - **TSH with free T4 reflex FUTURE anytime; Future    Erectile dysfunction, unspecified erectile dysfunction type  Chronic issue. Did have previous prescription for viagra but was very expensive so he has been using viagra 100 mg from online source that he reports went out of business. Will fill. He is not taking any nitrates, no contraindications.  - sildenafil (VIAGRA) 100 MG tablet; Take 0.5-1 tablets ( mg) by mouth daily as needed (erectile dysfunction)       BMI:   Estimated body mass index is 29.68 kg/m  as calculated from the following:    Height as of 12/5/19: 1.88 m (6' 2\").    Weight as of 12/5/19: 104.9 kg (231 lb 3.2 oz).       Return in about 1 week (around 10/5/2020) for If worsening or no improvement.    Laina Martines PA-C  [facility]      Video-Visit Details    Type of service:  Video Visit    Video End Time: 1:25 PM    Originating Location (pt. Location): Home    Distant Location (provider location):  [facility]     Platform used for Video Visit: [place]        "

## 2020-11-04 DIAGNOSIS — E78.2 MIXED HYPERLIPIDEMIA: ICD-10-CM

## 2020-11-04 DIAGNOSIS — K59.00 CONSTIPATION, UNSPECIFIED CONSTIPATION TYPE: ICD-10-CM

## 2020-11-04 DIAGNOSIS — R10.30 LOWER ABDOMINAL PAIN: ICD-10-CM

## 2020-11-04 DIAGNOSIS — R20.0 NUMBNESS AND TINGLING OF FOOT: ICD-10-CM

## 2020-11-04 DIAGNOSIS — R20.2 NUMBNESS AND TINGLING OF FOOT: ICD-10-CM

## 2020-11-04 LAB
ERYTHROCYTE [DISTWIDTH] IN BLOOD BY AUTOMATED COUNT: 11.7 % (ref 10–15)
FOLATE SERPL-MCNC: 18.8 NG/ML
HBA1C MFR BLD: 5.1 % (ref 0–5.6)
HCT VFR BLD AUTO: 43.3 % (ref 40–53)
HGB BLD-MCNC: 14.4 G/DL (ref 13.3–17.7)
MCH RBC QN AUTO: 32.2 PG (ref 26.5–33)
MCHC RBC AUTO-ENTMCNC: 33.3 G/DL (ref 31.5–36.5)
MCV RBC AUTO: 97 FL (ref 78–100)
PLATELET # BLD AUTO: 186 10E9/L (ref 150–450)
RBC # BLD AUTO: 4.47 10E12/L (ref 4.4–5.9)
VIT B12 SERPL-MCNC: 465 PG/ML (ref 193–986)
WBC # BLD AUTO: 6.2 10E9/L (ref 4–11)

## 2020-11-04 PROCEDURE — 83036 HEMOGLOBIN GLYCOSYLATED A1C: CPT | Performed by: PHYSICIAN ASSISTANT

## 2020-11-04 PROCEDURE — 80053 COMPREHEN METABOLIC PANEL: CPT | Performed by: PHYSICIAN ASSISTANT

## 2020-11-04 PROCEDURE — 82607 VITAMIN B-12: CPT | Performed by: PHYSICIAN ASSISTANT

## 2020-11-04 PROCEDURE — 36415 COLL VENOUS BLD VENIPUNCTURE: CPT | Performed by: PHYSICIAN ASSISTANT

## 2020-11-04 PROCEDURE — 85027 COMPLETE CBC AUTOMATED: CPT | Performed by: PHYSICIAN ASSISTANT

## 2020-11-04 PROCEDURE — 84443 ASSAY THYROID STIM HORMONE: CPT | Performed by: PHYSICIAN ASSISTANT

## 2020-11-04 PROCEDURE — 82746 ASSAY OF FOLIC ACID SERUM: CPT | Performed by: PHYSICIAN ASSISTANT

## 2020-11-04 PROCEDURE — 80061 LIPID PANEL: CPT | Performed by: PHYSICIAN ASSISTANT

## 2020-11-05 LAB
ALBUMIN SERPL-MCNC: 3.9 G/DL (ref 3.4–5)
ALP SERPL-CCNC: 39 U/L (ref 40–150)
ALT SERPL W P-5'-P-CCNC: 27 U/L (ref 0–70)
ANION GAP SERPL CALCULATED.3IONS-SCNC: 5 MMOL/L (ref 3–14)
AST SERPL W P-5'-P-CCNC: 16 U/L (ref 0–45)
BILIRUB SERPL-MCNC: 0.5 MG/DL (ref 0.2–1.3)
BUN SERPL-MCNC: 20 MG/DL (ref 7–30)
CALCIUM SERPL-MCNC: 9.4 MG/DL (ref 8.5–10.1)
CHLORIDE SERPL-SCNC: 109 MMOL/L (ref 94–109)
CHOLEST SERPL-MCNC: 213 MG/DL
CO2 SERPL-SCNC: 25 MMOL/L (ref 20–32)
CREAT SERPL-MCNC: 1.19 MG/DL (ref 0.66–1.25)
GFR SERPL CREATININE-BSD FRML MDRD: 66 ML/MIN/{1.73_M2}
GLUCOSE SERPL-MCNC: 91 MG/DL (ref 70–99)
HDLC SERPL-MCNC: 37 MG/DL
LDLC SERPL CALC-MCNC: 141 MG/DL
NONHDLC SERPL-MCNC: 176 MG/DL
POTASSIUM SERPL-SCNC: 4.3 MMOL/L (ref 3.4–5.3)
PROT SERPL-MCNC: 7.8 G/DL (ref 6.8–8.8)
SODIUM SERPL-SCNC: 139 MMOL/L (ref 133–144)
TRIGL SERPL-MCNC: 173 MG/DL
TSH SERPL DL<=0.005 MIU/L-ACNC: 1.29 MU/L (ref 0.4–4)

## 2020-11-30 ENCOUNTER — THERAPY VISIT (OUTPATIENT)
Dept: PHYSICAL THERAPY | Facility: CLINIC | Age: 60
End: 2020-11-30
Payer: COMMERCIAL

## 2020-11-30 DIAGNOSIS — G89.29 CHRONIC LEFT SHOULDER PAIN: ICD-10-CM

## 2020-11-30 DIAGNOSIS — M25.512 CHRONIC LEFT SHOULDER PAIN: ICD-10-CM

## 2020-11-30 PROCEDURE — 97110 THERAPEUTIC EXERCISES: CPT | Mod: GP | Performed by: PHYSICAL THERAPIST

## 2020-11-30 PROCEDURE — 97161 PT EVAL LOW COMPLEX 20 MIN: CPT | Mod: GP | Performed by: PHYSICAL THERAPIST

## 2020-11-30 NOTE — PROGRESS NOTES
[facility] Initial Evaluation  Subjective:  The history is provided by the patient. No  was used.   Patient Health History  Ronald Ingram being seen for L shoulder pain.     Date of Onset: 1.5 months ago.   HPI: Documentation: Unknown but suspects may be from lifting leaves.   Pain is reported as 4/10 on pain scale.  General health as reported by patient is good.  Pertinent medical history includes: none.   Red flags:  None as reported by patient.   Other medical allergies details: See EPIC.   Surgeries include:  Orthopedic surgery. Other surgery history details: L labral repair in 2014.    Current medications:  Sleep medication.    Current occupation is none given.                     Therapist Generated HPI Evaluation  Problem details: Pt. complains of L shoulder pain that has been present for 1.5 months.  No known trauma.  PT order dated 11/23/2020.      .         Type of problem:  Left shoulder.    This is a chronic condition.  Condition occurred with:  Unknown cause.  Where condition occurred: for unknown reasons.  Patient reports pain:  In the joint and upper arm.  Pain is described as aching and is intermittent.  Pain radiates to:  Shoulder. Pain is worse during the day.  Since onset symptoms are unchanged.  Associated symptoms:  Loss of strength. Symptoms are exacerbated by using arm at shoulder level, using arm overhead and using arm behind back  and relieved by rest.  Special tests included:  X-ray (negative per patient).                            Objective:  Standing Alignment:      Shoulder/UE:  Rounded shoulders                                       Shoulder Evaluation:  ROM:  AROM:  normal                            PROM:  normal                            Pain: ERP wieth full flexion and combined ext/IR on left  Endfeel: normal  Strength:    Flexion: Left:4/5   Pain:    Right: 4/5     Pain:   Extension:  Left: 4/5    Pain:    Right: 4/5    Pain:  Abduction:   Left: 4/5  Pain:    Right: 4/5     Pain:    Internal Rotation:  Left:4/5     Pain:    Right: 4/5     Pain:  External Rotation:   Left:4/5     Pain:   Right:4/5     Pain:            Stability Testing:  normal      Special Tests:    Left shoulder positive for the following special tests:  Impingement  Left shoulder negative for the following special tests:  Rotator cuff tear and Acromioclavicular    Right shoulder negative for the following special tests:Impingement; Rotator cuff tear and Acrimioclavicular  Palpation:  normal  Left shoulder tenderness present at:  Biceps and Bicipital Groove  Left shoulder tenderness not present at: Acrimioclavicular; Supraspinatus; Levator; Rhomboids or Upper Trap    Right shoulder tenderness not present at:Acrimioclavicular; Biceps; Supraspinatus; Levator; Rhomboids; Upper Trap or Bicipital Groove  Mobility Tests:  normal                                                 General     ROS    Assessment/Plan:    Patient is a 60 year old male with left side shoulder complaints.    Patient has the following significant findings with corresponding treatment plan.                Diagnosis 1:  L shoulder pain  Pain -  hot/cold therapy, self management, education, directional preference exercise and home program  Decreased ROM/flexibility - manual therapy and therapeutic exercise  Decreased strength - therapeutic exercise and therapeutic activities  Impaired muscle performance - neuro re-education  Decreased function - therapeutic activities    Therapy Evaluation Codes:   1) Clinical presentation characteristics are:   Stable/Uncomplicated.  2) Decision-Making    Low complexity using standardized patient assessment instrument and/or measureable assessment of functional outcome.  Cumulative Therapy Evaluation is: Low complexity.    Previous and current functional limitations:  (See Goal Flow Sheet for this information)    Short term and Long term goals: (See Goal Flow Sheet for this  information)     Communication ability:  Patient appears to be able to clearly communicate and understand verbal and written communication and follow directions correctly.  Treatment Explanation - The following has been discussed with the patient:   RX ordered/plan of care  Anticipated outcomes  Possible risks and side effects  This patient would benefit from PT intervention to resume normal activities.   Rehab potential is good.    Frequency:  1 X week, once daily  Duration:  for 6 weeks  Discharge Plan:  Achieve all LTG.  Independent in home treatment program.  Reach maximal therapeutic benefit.    Please refer to the daily flowsheet for treatment today, total treatment time and time spent performing 1:1 timed codes.

## 2020-12-10 ENCOUNTER — THERAPY VISIT (OUTPATIENT)
Dept: PHYSICAL THERAPY | Facility: CLINIC | Age: 60
End: 2020-12-10
Payer: COMMERCIAL

## 2020-12-10 DIAGNOSIS — M25.512 CHRONIC LEFT SHOULDER PAIN: ICD-10-CM

## 2020-12-10 DIAGNOSIS — G89.29 CHRONIC LEFT SHOULDER PAIN: ICD-10-CM

## 2020-12-10 PROCEDURE — 97110 THERAPEUTIC EXERCISES: CPT | Mod: GP | Performed by: PHYSICAL THERAPIST

## 2021-01-05 ENCOUNTER — OFFICE VISIT (OUTPATIENT)
Dept: DERMATOLOGY | Facility: CLINIC | Age: 61
End: 2021-01-05
Payer: COMMERCIAL

## 2021-01-05 DIAGNOSIS — L57.0 AK (ACTINIC KERATOSIS): Primary | ICD-10-CM

## 2021-01-05 PROCEDURE — 17000 DESTRUCT PREMALG LESION: CPT | Performed by: DERMATOLOGY

## 2021-01-05 PROCEDURE — 99213 OFFICE O/P EST LOW 20 MIN: CPT | Mod: 25 | Performed by: DERMATOLOGY

## 2021-01-05 PROCEDURE — 17003 DESTRUCT PREMALG LES 2-14: CPT | Performed by: DERMATOLOGY

## 2021-01-05 NOTE — LETTER
1/5/2021      RE: Ronald Ingram  [address]  [facility] [address]       Dermatology Problem List:  1. Melanoma, L upper arm B[place] at least 2.6 mm, >1 mitosis, + ulceration. [place] node negative. Diagnosis 6/6/17  2. Benign pigmented nevi   3. Monitoring macule on the R dorsal toe  4. Aks - liquid nitrogen but 5FU recommended  5. Seborrheic keratoses  6. Tanning bed use    Service Date: 01/05/2021      CHIEF COMPLAINT:  Skin check.      HISTORY OF PRESENT ILLNESS:  Mr. Ingram is a 60-year-old male returning to Dermatology for a 6-month skin check in his history of melanoma.  He was last seen on 07/28/2020.  Since that time, he notes ongoing scaling areas on the face.  No new other skin lesions.  No painful or tender areas.      Patient Active Problem List   Diagnosis     Mixed hyperlipidemia     Labral tear of shoulder- left; work related     CARDIOVASCULAR SCREENING; LDL GOAL LESS THAN 160     Pain in joint involving ankle and foot     Ankle joint stiffness     Right shoulder pain     Grief     AK (actinic keratosis)     Solar lentigo     Seborrheic keratosis     Melanoma of left upper arm (H)     SK (seborrheic keratosis)     Metastatic malignant melanoma (H)     Chronic left shoulder pain       Allergies   Allergen Reactions     Dust Mites      Nasal cavity tingles          Current Outpatient Medications   Medication     fenofibrate (TRIGLIDE/LOFIBRA) 160 MG tablet     fish oil-omega-3 fatty acids (OMEGA 3) 1000 MG capsule     Melatonin 10 MG TABS tablet     sildenafil (VIAGRA) 100 MG tablet     traZODone (DESYREL) 50 MG tablet     Multiple Vitamins-Minerals (ONE-A-DAY ENERGY) TABS     Current Facility-Administered Medications   Medication     lidocaine 1% with EPINEPHrine 1:100,000 injection 3 mL            SOCIAL HISTORY:  The patient is expecting his first grandchild in May.  He has 2 sons.      PHYSICAL EXAMINATION:   GENERAL:  The patient is a healthy-appearing 60-year-old male in no distress.    HEENT:  Conjunctivae clear.   PULMONARY:  Breathing comfortably on room air.   ABDOMEN:  No abdominal distention.   SKIN:  Exam included the scalp, face, neck, chest, abdomen, back, arms, legs, hands, buttocks and genital area.  Skin exam normal except for as follows:   - No lymphadenopathy in the cervical, axillary or inguinal chains.   - Extensive flat-topped, waxy papules on central back, anterior chest and dorsal arms.   - Scattered light brown macules on chest, abdomen and back.   - Hypopigmented macules on abdomen, upper and lower back.   - 1.5 mm dark-brown macule on the left great toe.   - Linear scar in the left shoulder without nodularity or pigmentation.   - Gritty papules on the bilateral temples and bilateral lower cheeks for a total of 8 lesions.      ASSESSMENT AND PLAN:   1.  Seborrheic keratoses; benign hyperkeratotic papules.  No treatment advised.   2.  Benign pigmented nevi; no lesions of concern today.  We will continue to monitor.   3.  History of malignant melanoma, status post wide local excision without signs of recurrence.  Continue every 6-month skin checks until 06/2022.   4.  Actinic keratoses.  Eight lesions treated with 10-second freeze-thaw cycle today.   5.  Solar lentigines, chronic.  No treatment advised.      Rosa Jaquez MD   of Dermatology  [facility]

## 2021-01-06 NOTE — PROGRESS NOTES
Dermatology Problem List:  1. Melanoma, L upper arm Breslow at least 2.6 mm, >1 mitosis, + ulceration. [place] node negative. Diagnosis 6/6/17  2. Benign pigmented nevi   3. Monitoring macule on the R dorsal toe  4. Aks - liquid nitrogen but 5FU recommended  5. Seborrheic keratoses  6. Tanning bed use    Service Date: 01/05/2021      CHIEF COMPLAINT:  Skin check.      HISTORY OF PRESENT ILLNESS:  Mr. Ingram is a 60-year-old male returning to Dermatology for a 6-month skin check in his history of melanoma.  He was last seen on 07/28/2020.  Since that time, he notes ongoing scaling areas on the face.  No new other skin lesions.  No painful or tender areas.      Patient Active Problem List   Diagnosis     Mixed hyperlipidemia     Labral tear of shoulder- left; work related     CARDIOVASCULAR SCREENING; LDL GOAL LESS THAN 160     Pain in joint involving ankle and foot     Ankle joint stiffness     Right shoulder pain     Grief     AK (actinic keratosis)     Solar lentigo     Seborrheic keratosis     Melanoma of left upper arm (H)     SK (seborrheic keratosis)     Metastatic malignant melanoma (H)     Chronic left shoulder pain       Allergies   Allergen Reactions     Dust Mites      Nasal cavity tingles          Current Outpatient Medications   Medication     fenofibrate (TRIGLIDE/LOFIBRA) 160 MG tablet     fish oil-omega-3 fatty acids (OMEGA 3) 1000 MG capsule     Melatonin 10 MG TABS tablet     sildenafil (VIAGRA) 100 MG tablet     traZODone (DESYREL) 50 MG tablet     Multiple Vitamins-Minerals (ONE-A-DAY ENERGY) TABS     Current Facility-Administered Medications   Medication     lidocaine 1% with EPINEPHrine 1:100,000 injection 3 mL            SOCIAL HISTORY:  The patient is expecting his first grandchild in May.  He has 2 sons.      PHYSICAL EXAMINATION:   GENERAL:  The patient is a healthy-appearing 60-year-old male in no distress.   HEENT:  Conjunctivae clear.   PULMONARY:  Breathing comfortably on room air.    ABDOMEN:  No abdominal distention.   SKIN:  Exam included the scalp, face, neck, chest, abdomen, back, arms, legs, hands, buttocks and genital area.  Skin exam normal except for as follows:   - No lymphadenopathy in the cervical, axillary or inguinal chains.   - Extensive flat-topped, waxy papules on central back, anterior chest and dorsal arms.   - Scattered light brown macules on chest, abdomen and back.   - Hypopigmented macules on abdomen, upper and lower back.   - 1.5 mm dark-brown macule on the left great toe.   - Linear scar in the left shoulder without nodularity or pigmentation.   - Gritty papules on the bilateral temples and bilateral lower cheeks for a total of 8 lesions.      ASSESSMENT AND PLAN:   1.  Seborrheic keratoses; benign hyperkeratotic papules.  No treatment advised.   2.  Benign pigmented nevi; no lesions of concern today.  We will continue to monitor.   3.  History of malignant melanoma, status post wide local excision without signs of recurrence.  Continue every 6-month skin checks until 06/2022.   4.  Actinic keratoses.  Eight lesions treated with 10-second freeze-thaw cycle today.   5.  Solar lentigines, chronic.  No treatment advised.      Rosa Jaquez MD   of Dermatology  [facility]

## 2021-01-07 ENCOUNTER — THERAPY VISIT (OUTPATIENT)
Dept: PHYSICAL THERAPY | Facility: CLINIC | Age: 61
End: 2021-01-07
Payer: COMMERCIAL

## 2021-01-07 DIAGNOSIS — M25.512 CHRONIC LEFT SHOULDER PAIN: ICD-10-CM

## 2021-01-07 DIAGNOSIS — G89.29 CHRONIC LEFT SHOULDER PAIN: ICD-10-CM

## 2021-01-07 PROCEDURE — 97110 THERAPEUTIC EXERCISES: CPT | Mod: GP | Performed by: PHYSICAL THERAPIST

## 2021-01-14 ENCOUNTER — HEALTH MAINTENANCE LETTER (OUTPATIENT)
Age: 61
End: 2021-01-14

## 2021-01-26 ENCOUNTER — THERAPY VISIT (OUTPATIENT)
Dept: PHYSICAL THERAPY | Facility: CLINIC | Age: 61
End: 2021-01-26
Payer: COMMERCIAL

## 2021-01-26 DIAGNOSIS — M25.512 CHRONIC LEFT SHOULDER PAIN: ICD-10-CM

## 2021-01-26 DIAGNOSIS — G89.29 CHRONIC LEFT SHOULDER PAIN: ICD-10-CM

## 2021-01-26 PROCEDURE — 97110 THERAPEUTIC EXERCISES: CPT | Mod: GP | Performed by: PHYSICAL THERAPIST

## 2021-01-26 NOTE — LETTER
[facility]  [address] [place] [address]  [phone]    2021    Re: Ronald MONTERO Juan Jose   :   1960  MRN:  [id number]   REFERRING PHYSICIAN:   Pancho Lisa  [facility]  Date of Initial Evaluation:  2020  Visits:  Rxs Used: 4  Reason for Referral:  Chronic left shoulder pain    DISCHARGE REPORT  Progress reporting period is from eval to 21.       SUBJECTIVE  Subjective changes noted by patient:  .  Subjective: Functioning at 90% of where he wants to be.  No sx's in past 1-2 weeks    Current pain level is  Current Pain level: 0/10.     Previous pain level was   Initial Pain level: 4/10.   Changes in function:  Yes (See Goal flowsheet attached for changes in current functional level)  Adverse reaction to treatment or activity: None    OBJECTIVE  Changes noted in objective findings:  Yes,   Objective: AROM WNL Strength 4+/5 generally on left.     ASSESSMENT/PLAN  Updated problem list and treatment plan: Diagnosis 1:  L shoulder pain  Decreased strength - therapeutic exercise and therapeutic activities  Impaired muscle performance - neuro re-education  STG/LTGs have been met or progress has been made towards goals:  Yes (See Goal flow sheet completed today.)  Assessment of Progress: The patient's condition is improving.  The patient's condition has potential to improve.  The patient has met all of their long term goals.  Self Management Plans:  Patient has been instructed in a home treatment program.  Patient is independent in a home treatment program.  I have re-evaluated this patient and find that the nature, scope, duration and intensity of the therapy is appropriate for the medical condition of the patient.      Recommendations:  This patient is ready to be discharged from therapy and continue their home treatment program.          Re: Ronald WINSTON Juan Jose   :   1960      Thank you for your  referral.    INQUIRIES  Therapist: Krishna Joy PT  [facility]  [address] [place] [address]  Phone: [phone]  Fax: [phone]

## 2021-03-22 DIAGNOSIS — E78.00 ELEVATED CHOLESTEROL: ICD-10-CM

## 2021-03-22 RX ORDER — FENOFIBRATE 160 MG/1
160 TABLET ORAL DAILY
Qty: 90 TABLET | Refills: 0 | Status: SHIPPED | OUTPATIENT
Start: 2021-03-22 | End: 2021-07-20

## 2021-04-22 ENCOUNTER — OFFICE VISIT (OUTPATIENT)
Dept: FAMILY MEDICINE | Facility: CLINIC | Age: 61
End: 2021-04-22
Payer: COMMERCIAL

## 2021-04-22 VITALS
OXYGEN SATURATION: 97 % | TEMPERATURE: 97.9 F | HEIGHT: 74 IN | BODY MASS INDEX: 30.67 KG/M2 | DIASTOLIC BLOOD PRESSURE: 62 MMHG | HEART RATE: 76 BPM | RESPIRATION RATE: 16 BRPM | WEIGHT: 239 LBS | SYSTOLIC BLOOD PRESSURE: 110 MMHG

## 2021-04-22 DIAGNOSIS — F51.01 PRIMARY INSOMNIA: ICD-10-CM

## 2021-04-22 DIAGNOSIS — E78.00 ELEVATED CHOLESTEROL: ICD-10-CM

## 2021-04-22 DIAGNOSIS — R20.0 NUMBNESS AND TINGLING OF FOOT: Primary | ICD-10-CM

## 2021-04-22 DIAGNOSIS — R20.2 NUMBNESS AND TINGLING OF FOOT: Primary | ICD-10-CM

## 2021-04-22 PROCEDURE — 99214 OFFICE O/P EST MOD 30 MIN: CPT | Performed by: INTERNAL MEDICINE

## 2021-04-22 RX ORDER — CHOLECALCIFEROL (VITAMIN D3) 50 MCG
1 TABLET ORAL DAILY
COMMUNITY
Start: 2021-04-22

## 2021-04-22 RX ORDER — LANOLIN ALCOHOL/MO/W.PET/CERES
1000 CREAM (GRAM) TOPICAL DAILY
COMMUNITY
Start: 2021-04-22

## 2021-04-22 RX ORDER — TRAZODONE HYDROCHLORIDE 50 MG/1
50 TABLET, FILM COATED ORAL AT BEDTIME
Qty: 90 TABLET | Refills: 1 | Status: SHIPPED | OUTPATIENT
Start: 2021-04-22 | End: 2021-07-27

## 2021-04-22 ASSESSMENT — MIFFLIN-ST. JEOR: SCORE: 1963.85

## 2021-04-22 NOTE — PATIENT INSTRUCTIONS
The 10-year ASCVD risk score (Beckie VIEIRA Jr., et al., 2013) is: 8.8%    Values used to calculate the score:      Age: 60 years      Sex: Male      Is Non- : No      Diabetic: No      Tobacco smoker: No      Systolic Blood Pressure: 110 mmHg      Is BP treated: No      HDL Cholesterol: 37 mg/dL      Total Cholesterol: 213 mg/dL

## 2021-04-22 NOTE — PROGRESS NOTES
"    Assessment & Plan     (R20.0,  R20.2) Numbness and tingling of foot  (primary encounter diagnosis)  Comment: noting some symptoms, he is most concerned about possibility of diabetes. no personal hx.  Plan: Vitamin B12, cyanocobalamin (VITAMIN B-12) 1000        MCG tablet, vitamin D3 (CHOLECALCIFEROL) 50 mcg        (2000 units) tablet          (F51.01) Primary insomnia  Comment: discussed Trazodone and Advil PM; has not used either regualrly; discussed Trazodone on a nightly basis may result in better sleep overall.   Plan: traZODone (DESYREL) 50 MG tablet          (E78.00) Elevated cholesterol  Comment: reviewed past results  Plan: Lipid panel reflex to direct LDL Fasting,         Comprehensive metabolic panel (BMP + Alb, Alk         Phos, ALT, AST, Total. Bili, TP)            Review of the result(s) of each unique test - labs ordered;   30 minutes spent on the date of the encounter doing chart review, history and exam, documentation and further activities per the note       BMI:   Estimated body mass index is 30.69 kg/m  as calculated from the following:    Height as of this encounter: 1.88 m (6' 2\").    Weight as of this encounter: 108.4 kg (239 lb).   Weight management plan: Discussed healthy diet and exercise guidelines      No follow-ups on file.    Karen Robbins MD  Internal Medicine   [facility] ROLAND Cardenas is a 60 year old who presents for the following health issues     HPI     Musculoskeletal problem/pain  Onset/Duration: couple years  Description  Location: foot - bilateral,  L>R  Joint Swelling: YES- at times, this seems to comes and goes  Redness: no  Pain: YES- irritating, wakes him up in the middle of the night  Warmth: clamy  Intensity:  moderate  Progression of Symptoms:  same and intermittent  Accompanying signs and symptoms:  Tongue looks yellow on top, can brush it off with tooth brush. Is concerned maybe related to Diabetes.  Fevers: " "no  Numbness/tingling/weakness: YES- tingling in left toes, and right foot numbness  History  Trauma to the area: no  Recent illness:  no  Previous similar problem: YES- see chart  Previous evaluation:  no  Precipitating or alleviating factors:  Aggravating factors include: none  Therapies tried and outcome: wiggling in the middle of the night to wake them        Review of Systems   CONSTITUTIONAL: NEGATIVE for fever, chills, change in weight  INTEGUMENTARY/SKIN: hx of melanoma; wide excision; improved; close monitoring.  ENT/MOUTH: NEGATIVE for ear, mouth and throat problems  RESP: NEGATIVE for significant cough or SOB  CV: NEGATIVE for chest pain, palpitations or peripheral edema  GI: NEGATIVE for nausea, abdominal pain, heartburn, or change in bowel habits  MUSCULOSKELETAL: NEGATIVE for significant arthralgias or myalgia  NEURO: numbness dn tingling  ENDOCRINE: no hx of diabetes; reviewed lipids;  concerns addressed.   PSYCHIATRIC: NEGATIVE for changes in mood or affect      Objective    /62 (BP Location: Right arm, Patient Position: Sitting, Cuff Size: Adult Large)   Pulse 76   Temp 97.9  F (36.6  C) (Oral)   Resp 16   Ht 1.88 m (6' 2\")   Wt 108.4 kg (239 lb)   SpO2 97%   BMI 30.69 kg/m    Body mass index is 30.69 kg/m .  Physical Exam   GENERAL: healthy, alert and no distress  NECK: no adenopathy, no asymmetry, masses, or scars and thyroid normal to palpation  RESP: lungs clear to auscultation - no rales, rhonchi or wheezes  CV: regular rates and rhythm, normal S1 S2, no S3 or S4 and no peripheral edema  ABDOMEN: soft, nontender and bowel sounds normal  MS: no gross musculoskeletal defects noted, no edema  NEURO: Normal strength and tone, mentation intact and speech normal; monofilament exam WNL  PSYCH: mentation appears normal, affect normal/bright            "

## 2021-05-25 ENCOUNTER — NURSE TRIAGE (OUTPATIENT)
Dept: FAMILY MEDICINE | Facility: CLINIC | Age: 61
End: 2021-05-25

## 2021-05-25 ENCOUNTER — OFFICE VISIT (OUTPATIENT)
Dept: URGENT CARE | Facility: URGENT CARE | Age: 61
End: 2021-05-25
Payer: COMMERCIAL

## 2021-05-25 VITALS
SYSTOLIC BLOOD PRESSURE: 130 MMHG | RESPIRATION RATE: 20 BRPM | HEART RATE: 78 BPM | TEMPERATURE: 98 F | OXYGEN SATURATION: 98 % | DIASTOLIC BLOOD PRESSURE: 78 MMHG

## 2021-05-25 DIAGNOSIS — H92.02 ACUTE EAR PAIN, LEFT: Primary | ICD-10-CM

## 2021-05-25 DIAGNOSIS — J02.9 SORE THROAT: ICD-10-CM

## 2021-05-25 DIAGNOSIS — Z20.822 PERSON UNDER INVESTIGATION FOR COVID-19: ICD-10-CM

## 2021-05-25 DIAGNOSIS — H60.502 ACUTE OTITIS EXTERNA OF LEFT EAR, UNSPECIFIED TYPE: ICD-10-CM

## 2021-05-25 LAB
DEPRECATED S PYO AG THROAT QL EIA: NEGATIVE
SPECIMEN SOURCE: NORMAL

## 2021-05-25 PROCEDURE — 99213 OFFICE O/P EST LOW 20 MIN: CPT | Performed by: PHYSICIAN ASSISTANT

## 2021-05-25 PROCEDURE — 87651 STREP A DNA AMP PROBE: CPT | Performed by: PHYSICIAN ASSISTANT

## 2021-05-25 PROCEDURE — U0005 INFEC AGEN DETEC AMPLI PROBE: HCPCS | Performed by: PHYSICIAN ASSISTANT

## 2021-05-25 PROCEDURE — U0003 INFECTIOUS AGENT DETECTION BY NUCLEIC ACID (DNA OR RNA); SEVERE ACUTE RESPIRATORY SYNDROME CORONAVIRUS 2 (SARS-COV-2) (CORONAVIRUS DISEASE [COVID-19]), AMPLIFIED PROBE TECHNIQUE, MAKING USE OF HIGH THROUGHPUT TECHNOLOGIES AS DESCRIBED BY CMS-2020-01-R: HCPCS | Performed by: PHYSICIAN ASSISTANT

## 2021-05-25 PROCEDURE — 99N1174 PR STATISTIC STREP A RAPID: Performed by: PHYSICIAN ASSISTANT

## 2021-05-25 RX ORDER — NEOMYCIN SULFATE, POLYMYXIN B SULFATE, HYDROCORTISONE 3.5; 10000; 1 MG/ML; [USP'U]/ML; MG/ML
3 SOLUTION/ DROPS AURICULAR (OTIC) 4 TIMES DAILY
Qty: 5 ML | Refills: 0 | Status: SHIPPED | OUTPATIENT
Start: 2021-05-25 | End: 2021-06-01

## 2021-05-25 NOTE — PROGRESS NOTES
SUBJECTIVE:  Ronald Ingram is a 60 year old male with a chief complaint of sore throat and sore left ear for 1 week.  Course of illness: intermittent episodes lasting. Current and Associated symptoms: none  Treatment measures tried include None tried.  Predisposing factors include None.    Past Medical History:   Diagnosis Date     Hyperlipidemia      Current Outpatient Medications   Medication Sig Dispense Refill     cyanocobalamin (VITAMIN B-12) 1000 MCG tablet Take 1 tablet (1,000 mcg) by mouth daily       fenofibrate (TRIGLIDE/LOFIBRA) 160 MG tablet Take 1 tablet (160 mg) by mouth daily 90 tablet 0     fish oil-omega-3 fatty acids (OMEGA 3) 1000 MG capsule Take 1 capsule by mouth daily. 90 capsule 3     Melatonin 10 MG TABS tablet Take 10 mg by mouth At Bedtime       Multiple Vitamins-Minerals (ONE-A-DAY ENERGY) TABS Take  by mouth.       sildenafil (VIAGRA) 100 MG tablet Take 0.5-1 tablets ( mg) by mouth daily as needed (erectile dysfunction) 12 tablet 1     traZODone (DESYREL) 50 MG tablet Take 1 tablet (50 mg) by mouth At Bedtime 90 tablet 1     vitamin D3 (CHOLECALCIFEROL) 50 mcg (2000 units) tablet Take 1 tablet (50 mcg) by mouth daily       Social History     Tobacco Use     Smoking status: Never Smoker     Smokeless tobacco: Never Used   Substance Use Topics     Alcohol use: Yes     Comment: rarely       ROS:  10 point ROS negative except as listed above      OBJECTIVE:   /78   Pulse 78   Temp 98  F (36.7  C)   Resp 20   SpO2 98%   GENERAL APPEARANCE: healthy, alert and no distress  EYES: EOMI,  PERRL, conjunctiva clear  HENT: TM's normal.  L canal erythematous and swollen. Nose normal.  Pharynx erythematous   NECK: supple, non-tender to palpation, no adenopathy noted  RESP: lungs clear to auscultation - no rales, rhonchi or wheezes  CV: regular rates and rhythm, normal S1 S2, no murmur noted  SKIN: no suspicious lesions or rashes    Results for orders placed or performed in visit on  05/25/21   Symptomatic COVID-19 Virus (Coronavirus) by PCR     Status: None    Specimen: Nasopharyngeal   Result Value Ref Range    COVID-19 Virus PCR to [facility] - Source Nasopharyngeal     COVID-19 Virus PCR to [facility] - Result       Test received-See reflex to IDDL test SARS CoV2 (COVID-19) Virus RT-PCR   Streptococcus A Rapid Scr w Reflx to PCR     Status: None    Specimen: Throat   Result Value Ref Range    Strep Specimen Description Throat     Streptococcus Group A Rapid Screen Negative NEG^Negative   Group A Streptococcus PCR Throat Swab     Status: None    Specimen: Throat   Result Value Ref Range    Specimen Description Throat     Strep Group A PCR Not Detected NDET^Not Detected         ASSESSMENT:  (H92.02) Acute ear pain, left  (primary encounter diagnosis)  Plan: OTOLARYNGOLOGY REFERRAL, SARS-CoV-2 COVID-19         Virus (Coronavirus) by PCR      (J02.9) Sore throat  (Z20.822) Person under investigation for COVID-19  Plan: Symptomatic COVID-19 Virus (Coronavirus) by         PCR, Streptococcus A Rapid Scr w Reflx to PCR,         Group A Streptococcus PCR Throat Swab      (H60.502) Acute otitis externa of left ear, unspecified type  Plan: neomycin-polymyxin-hydrocortisone (CORTISPORIN)        [id number] otic solution          Covid-19  Pt was evaluated during a global COVID-19 pandemic, which necessitated consideration that the patient might be at risk for infection with the SARS-CoV-2 virus that causes COVID-19.   Applicable protocols for evaluation were followed during the patient's care.   COVID-19 was considered as part of the patient's evaluation. The plan for testing is:  a test was ordered during this visit.    No severe headache, chest pain, shortness of breath  No additional infectious symptoms  Rest, isolate for 10 days, hydrate, test, follow up if worsening or new symptoms  HH members to isolate until test results, if positive isolate for 2 weeks and follow up for testing if symptoms occur  Red  flags and emergent follow up discussed, and understood by patient  Follow up with PCP if symptoms worsen or fail to improve    Surgical mask, gown, shield, hairnet, gloves worn by provider      Patient Instructions     Patient Education     External Ear Infection (Adult)    External otitis (also called  swimmer s ear ) is an infection in the ear canal. It's often caused by bacteria or fungus. It can occur a few days after water gets trapped in the ear canal (from swimming or bathing). It can also occur after cleaning too deeply in the ear canal with a cotton swab or other object. Sometimes, hair care products get into the ear canal and cause this problem.   Symptoms can include pain, fever, itching, redness, drainage, or swelling of the ear canal. Temporary hearing loss may also occur.   Home care    Don't try to clean the ear canal. This can push pus and bacteria deeper into the canal.    Use prescribed ear drops as directed. These help reduce swelling and fight the infection. If an ear wick was placed in the ear canal, apply drops right onto the end of the wick. The wick will draw the medicine into the ear canal even if it's swollen closed.    A cotton ball may be loosely placed in the outer ear to absorb any drainage.    You may use over-the-counter medicines to control pain as directed by the healthcare provider, unless another medicine was prescribed. Talk with your provider before using these medicines if you have chronic liver or kidney disease or ever had a stomach ulcer or digestive tract bleeding.    Don't allow water to get into your ear when bathing. Also don't swim until the infection has cleared.    Prevention    Keep your ears dry. This helps lower the risk of infection. Dry your ears with a towel or hair dryer after getting wet. Also, use ear plugs when swimming.    Don't stick any objects in the ear to remove wax.    Talk with your provider about using ear drops to prevent swimmer's ear in case you  feel water trapped in your ear canal. You can get these drops over the counter at most drugstores. They work by removing water from the ear canal.    Follow-up care  Follow up with your healthcare provider in 1 week, or as advised.   When to seek medical advice  Call your healthcare provider right away if any of these occur:     Ear pain becomes worse or doesn t improve after 3 days of treatment    Redness or swelling of the outer ear occurs or gets worse    Headache    Fever of 100.4 F (38 C) or higher, or as directed by your healthcare provider  Call 911  Call 911 or get immediate medical care if any of the following occur:     Seizure    Unusual drowsiness or confusion    Unusual painful or stiff neck    [employer] last reviewed this educational content on 8/1/2020 2000-2021 [employer]. All rights reserved. This information is not intended as a substitute for professional medical care. Always follow your healthcare professional's instructions.

## 2021-05-25 NOTE — PATIENT INSTRUCTIONS
Patient Education     External Ear Infection (Adult)    External otitis (also called  swimmer s ear ) is an infection in the ear canal. It's often caused by bacteria or fungus. It can occur a few days after water gets trapped in the ear canal (from swimming or bathing). It can also occur after cleaning too deeply in the ear canal with a cotton swab or other object. Sometimes, hair care products get into the ear canal and cause this problem.   Symptoms can include pain, fever, itching, redness, drainage, or swelling of the ear canal. Temporary hearing loss may also occur.   Home care    Don't try to clean the ear canal. This can push pus and bacteria deeper into the canal.    Use prescribed ear drops as directed. These help reduce swelling and fight the infection. If an ear wick was placed in the ear canal, apply drops right onto the end of the wick. The wick will draw the medicine into the ear canal even if it's swollen closed.    A cotton ball may be loosely placed in the outer ear to absorb any drainage.    You may use over-the-counter medicines to control pain as directed by the healthcare provider, unless another medicine was prescribed. Talk with your provider before using these medicines if you have chronic liver or kidney disease or ever had a stomach ulcer or digestive tract bleeding.    Don't allow water to get into your ear when bathing. Also don't swim until the infection has cleared.    Prevention    Keep your ears dry. This helps lower the risk of infection. Dry your ears with a towel or hair dryer after getting wet. Also, use ear plugs when swimming.    Don't stick any objects in the ear to remove wax.    Talk with your provider about using ear drops to prevent swimmer's ear in case you feel water trapped in your ear canal. You can get these drops over the counter at most drugstores. They work by removing water from the ear canal.    Follow-up care  Follow up with your healthcare provider in 1 week,  or as advised.   When to seek medical advice  Call your healthcare provider right away if any of these occur:     Ear pain becomes worse or doesn t improve after 3 days of treatment    Redness or swelling of the outer ear occurs or gets worse    Headache    Fever of 100.4 F (38 C) or higher, or as directed by your healthcare provider  Call 911  Call 911 or get immediate medical care if any of the following occur:     Seizure    Unusual drowsiness or confusion    Unusual painful or stiff neck    [employer] last reviewed this educational content on 8/1/2020 2000-2021 [employer]. All rights reserved. This information is not intended as a substitute for professional medical care. Always follow your healthcare professional's instructions.

## 2021-05-25 NOTE — TELEPHONE ENCOUNTER
"S-(situation): Patient calling in experiencing earache which shoots to throbbing pain (9/10) for 5 seconds and then subsides to dull 2/10 pain.     B-(background): Ear pain started 1 week ago. Did also hit back of the head at playground on 5/9/21. Reported no headache, no laceration, no LOC, - admits to lump on back of head.     A-(assessment): See below.     R-(recommendations): Per protocol with stiff neck (though patient CAN touch chin to chest), advised UC today. Patient agreed with plan, no further questions or concerns at this time.     Reason for Disposition    Severe earache pain    Additional Information    Negative: Pink or red swelling behind the ear    Negative: Stiff neck (can't touch chin to chest)    Negative: Patient sounds very sick or weak to the triager    Negative: Fever > 103 F (39.4 C)    Negative: Pointed object was inserted into the ear canal (e.g., a pencil, stick, or wire)    Negative: Moving the earlobe or touching the ear clearly increases the pain    Negative: Sounds like a life-threatening emergency to the triager    Answer Assessment - Initial Assessment Questions  1. LOCATION: \"Which ear is involved?\"      Left ear  2. ONSET: \"When did the ear start hurting\"       Started hurting 1 week ago  3. SEVERITY: \"How bad is the pain?\"  (Scale 1-10; mild, moderate or severe)    - MILD (1-3): doesn't interfere with normal activities     - MODERATE (4-7): interferes with normal activities or awakens from sleep     - SEVERE (8-10): excruciating pain, unable to do any normal activities       Most of the time is dull earache at 2/10 but then \"boom\" pain will shoot to throbbing pain in ear rated as 9/10 for 5 seconds.   4. URI SYMPTOMS: \"Do you have a runny nose or cough?\"      No runny nose. Did have cough yesterday.   5. FEVER: \"Do you have a fever?\" If so, ask: \"What is your temperature, how was it measured, and when did it start?\"      No fever.   6. CAUSE: \"Have you been swimming recently?\", " "\"How often do you use Q-TIPS?\", \"Have you had any recent air travel or scuba diving?\"      No.   7. OTHER SYMPTOMS: \"Do you have any other symptoms?\" (e.g., headache, stiff neck, dizziness, vomiting, runny nose, decreased hearing)      Stiff neck; also has three small lumps (one behind left ear, one under chin, one on left side of neck).   8. PREGNANCY: \"Is there any chance you are pregnant?\" \"When was your last menstrual period?\"      No    Protocols used: EARACHE-A-[place]    Robson BINGHAM RN      "

## 2021-05-26 LAB
LABORATORY COMMENT REPORT: NORMAL
SARS-COV-2 RNA RESP QL NAA+PROBE: NEGATIVE
SARS-COV-2 RNA RESP QL NAA+PROBE: NORMAL
SPECIMEN SOURCE: NORMAL
STREP GROUP A PCR: NOT DETECTED

## 2021-07-06 ENCOUNTER — OFFICE VISIT (OUTPATIENT)
Dept: DERMATOLOGY | Facility: CLINIC | Age: 61
End: 2021-07-06
Payer: COMMERCIAL

## 2021-07-06 DIAGNOSIS — L57.0 AK (ACTINIC KERATOSIS): Primary | ICD-10-CM

## 2021-07-06 DIAGNOSIS — D22.9 MULTIPLE NEVI: ICD-10-CM

## 2021-07-06 DIAGNOSIS — C43.62 MELANOMA OF LEFT UPPER ARM (H): ICD-10-CM

## 2021-07-06 DIAGNOSIS — L82.1 SEBORRHEIC KERATOSIS: ICD-10-CM

## 2021-07-06 PROCEDURE — 99213 OFFICE O/P EST LOW 20 MIN: CPT | Mod: 25 | Performed by: DERMATOLOGY

## 2021-07-06 PROCEDURE — 17000 DESTRUCT PREMALG LESION: CPT | Performed by: DERMATOLOGY

## 2021-07-06 PROCEDURE — 17003 DESTRUCT PREMALG LES 2-14: CPT | Performed by: DERMATOLOGY

## 2021-07-06 NOTE — LETTER
7/6/2021      RE: Ronald Ingram  [address]  [facility] [address]       Dermatology Problem List:  1. Melanoma, L upper arm B[place] at least 2.6 mm, >1 mitosis, + ulceration. [place] node negative. Diagnosis 6/6/17  2. Benign pigmented nevi   3. Monitoring macule on the R dorsal toe  4. Aks - liquid nitrogen but 5FU recommended  5. Seborrheic keratoses  6. Tanning bed use    Service Date: 01/05/2021      CHIEF COMPLAINT:  Skin check.      HISTORY OF PRESENT ILLNESS:  Mr. Ingram is a 60-year-old male returning to Dermatology for a 6-month skin check in his history of melanoma.  He was last seen in 1/2021.  Since that time, he notes ongoing scaling areas on the face over the L upper cheek. No pain or bleeding. No other new growths.      Patient Active Problem List   Diagnosis     Mixed hyperlipidemia     Labral tear of shoulder- left; work related     CARDIOVASCULAR SCREENING; LDL GOAL LESS THAN 160     Pain in joint involving ankle and foot     Ankle joint stiffness     Right shoulder pain     Grief     AK (actinic keratosis)     Solar lentigo     Seborrheic keratosis     Melanoma of left upper arm (H)     SK (seborrheic keratosis)     Metastatic malignant melanoma (H)       Allergies   Allergen Reactions     Dust Mites      Nasal cavity tingles          Current Outpatient Medications   Medication     cyanocobalamin (VITAMIN B-12) 1000 MCG tablet     fenofibrate (TRIGLIDE/LOFIBRA) 160 MG tablet     fish oil-omega-3 fatty acids (OMEGA 3) 1000 MG capsule     Melatonin 10 MG TABS tablet     Multiple Vitamins-Minerals (ONE-A-DAY ENERGY) TABS     sildenafil (VIAGRA) 100 MG tablet     traZODone (DESYREL) 50 MG tablet     vitamin D3 (CHOLECALCIFEROL) 50 mcg (2000 units) tablet     Current Facility-Administered Medications   Medication     lidocaine 1% with EPINEPHrine 1:100,000 injection 3 mL            SOCIAL HISTORY:  The patient is expecting his first grandchild in May.  He has 2 sons.      PHYSICAL EXAMINATION:    GENERAL:  The patient is a healthy-appearing 60-year-old male in no distress.   HEENT:  Conjunctivae clear.   PULMONARY:  Breathing comfortably on room air.   ABDOMEN:  No abdominal distention.   SKIN:  Exam included the scalp, face, neck, chest, abdomen, back, arms, legs, hands, buttocks and genital area.  Skin exam normal except for as follows:   - No lymphadenopathy in the cervical, axillary or inguinal chains.   - Extensive flat-topped, waxy papules on central back, anterior chest and dorsal arms.   - Scattered light brown macules on chest, abdomen and back.   - Hypopigmented macules on abdomen, upper and lower back.   - 1.5 mm dark-brown macule on the left great toe.   - Light brown macules on the bilateral plantar feet  - Linear scar in the left shoulder without nodularity or pigmentation.   - Gritty papules on the bilateral temples and bilateral lower cheeks for a total of 7 lesions     ASSESSMENT AND PLAN:   1.  Seborrheic keratoses; benign hyperkeratotic papules.  No treatment advised.   2.  Benign pigmented nevi; no lesions of concern today.  We will continue to monitor.   3.  History of malignant melanoma, status post wide local excision without signs of recurrence.  Continue every 6-month skin checks until 06/2022.   4.  Actinic keratoses.  Seven large lesions treated with 10-second freeze-thaw cycle today. Advised field treatment which patient defers for now  5.  Solar lentigines, chronic.  No treatment advised.      Rosa Jaquez MD   of Dermatology  [facility]

## 2021-07-06 NOTE — PROGRESS NOTES
Dermatology Problem List:  1. Melanoma, L upper arm [place] at least 2.6 mm, >1 mitosis, + ulceration. [place] node negative. Diagnosis 6/6/17  2. Benign pigmented nevi   3. Monitoring macule on the R dorsal toe  4. Aks - liquid nitrogen but 5FU recommended  5. Seborrheic keratoses  6. Tanning bed use    Service Date: 01/05/2021      CHIEF COMPLAINT:  Skin check.      HISTORY OF PRESENT ILLNESS:  Mr. Ingram is a 60-year-old male returning to Dermatology for a 6-month skin check in his history of melanoma.  He was last seen in 1/2021.  Since that time, he notes ongoing scaling areas on the face over the L upper cheek. No pain or bleeding. No other new growths.      Patient Active Problem List   Diagnosis     Mixed hyperlipidemia     Labral tear of shoulder- left; work related     CARDIOVASCULAR SCREENING; LDL GOAL LESS THAN 160     Pain in joint involving ankle and foot     Ankle joint stiffness     Right shoulder pain     Grief     AK (actinic keratosis)     Solar lentigo     Seborrheic keratosis     Melanoma of left upper arm (H)     SK (seborrheic keratosis)     Metastatic malignant melanoma (H)       Allergies   Allergen Reactions     Dust Mites      Nasal cavity tingles          Current Outpatient Medications   Medication     cyanocobalamin (VITAMIN B-12) 1000 MCG tablet     fenofibrate (TRIGLIDE/LOFIBRA) 160 MG tablet     fish oil-omega-3 fatty acids (OMEGA 3) 1000 MG capsule     Melatonin 10 MG TABS tablet     Multiple Vitamins-Minerals (ONE-A-DAY ENERGY) TABS     sildenafil (VIAGRA) 100 MG tablet     traZODone (DESYREL) 50 MG tablet     vitamin D3 (CHOLECALCIFEROL) 50 mcg (2000 units) tablet     Current Facility-Administered Medications   Medication     lidocaine 1% with EPINEPHrine 1:100,000 injection 3 mL            SOCIAL HISTORY:  The patient is expecting his first grandchild in May.  He has 2 sons.      PHYSICAL EXAMINATION:   GENERAL:  The patient is a healthy-appearing 60-year-old male in no distress.    HEENT:  Conjunctivae clear.   PULMONARY:  Breathing comfortably on room air.   ABDOMEN:  No abdominal distention.   SKIN:  Exam included the scalp, face, neck, chest, abdomen, back, arms, legs, hands, buttocks and genital area.  Skin exam normal except for as follows:   - No lymphadenopathy in the cervical, axillary or inguinal chains.   - Extensive flat-topped, waxy papules on central back, anterior chest and dorsal arms.   - Scattered light brown macules on chest, abdomen and back.   - Hypopigmented macules on abdomen, upper and lower back.   - 1.5 mm dark-brown macule on the left great toe.   - Light brown macules on the bilateral plantar feet  - Linear scar in the left shoulder without nodularity or pigmentation.   - Gritty papules on the bilateral temples and bilateral lower cheeks for a total of 7 lesions     ASSESSMENT AND PLAN:   1.  Seborrheic keratoses; benign hyperkeratotic papules.  No treatment advised.   2.  Benign pigmented nevi; no lesions of concern today.  We will continue to monitor.   3.  History of malignant melanoma, status post wide local excision without signs of recurrence.  Continue every 6-month skin checks until 06/2022.   4.  Actinic keratoses.  Seven large lesions treated with 10-second freeze-thaw cycle today. Advised field treatment which patient defers for now  5.  Solar lentigines, chronic.  No treatment advised.      Rosa Jaquez MD   of Dermatology  [facility]

## 2021-07-23 ENCOUNTER — LAB (OUTPATIENT)
Dept: LAB | Facility: CLINIC | Age: 61
End: 2021-07-23
Payer: COMMERCIAL

## 2021-07-23 DIAGNOSIS — R20.2 NUMBNESS AND TINGLING OF FOOT: ICD-10-CM

## 2021-07-23 DIAGNOSIS — E78.00 ELEVATED CHOLESTEROL: ICD-10-CM

## 2021-07-23 DIAGNOSIS — R20.0 NUMBNESS AND TINGLING OF FOOT: ICD-10-CM

## 2021-07-23 LAB — VIT B12 SERPL-MCNC: 626 PG/ML (ref 193–986)

## 2021-07-23 PROCEDURE — 80053 COMPREHEN METABOLIC PANEL: CPT

## 2021-07-23 PROCEDURE — 80061 LIPID PANEL: CPT

## 2021-07-23 PROCEDURE — 82607 VITAMIN B-12: CPT

## 2021-07-23 PROCEDURE — 36415 COLL VENOUS BLD VENIPUNCTURE: CPT

## 2021-07-24 LAB
ALBUMIN SERPL-MCNC: 4.1 G/DL (ref 3.4–5)
ALP SERPL-CCNC: 37 U/L (ref 40–150)
ALT SERPL W P-5'-P-CCNC: 32 U/L (ref 0–70)
ANION GAP SERPL CALCULATED.3IONS-SCNC: 5 MMOL/L (ref 3–14)
AST SERPL W P-5'-P-CCNC: 18 U/L (ref 0–45)
BILIRUB SERPL-MCNC: 0.5 MG/DL (ref 0.2–1.3)
BUN SERPL-MCNC: 17 MG/DL (ref 7–30)
CALCIUM SERPL-MCNC: 9.1 MG/DL (ref 8.5–10.1)
CHLORIDE BLD-SCNC: 108 MMOL/L (ref 94–109)
CHOLEST SERPL-MCNC: 201 MG/DL
CO2 SERPL-SCNC: 26 MMOL/L (ref 20–32)
CREAT SERPL-MCNC: 1.23 MG/DL (ref 0.66–1.25)
FASTING STATUS PATIENT QL REPORTED: YES
GFR SERPL CREATININE-BSD FRML MDRD: 63 ML/MIN/1.73M2
GLUCOSE BLD-MCNC: 90 MG/DL (ref 70–99)
HDLC SERPL-MCNC: 37 MG/DL
LDLC SERPL CALC-MCNC: 125 MG/DL
NONHDLC SERPL-MCNC: 164 MG/DL
POTASSIUM BLD-SCNC: 4 MMOL/L (ref 3.4–5.3)
PROT SERPL-MCNC: 7.4 G/DL (ref 6.8–8.8)
SODIUM SERPL-SCNC: 139 MMOL/L (ref 133–144)
TRIGL SERPL-MCNC: 197 MG/DL

## 2021-07-27 ENCOUNTER — OFFICE VISIT (OUTPATIENT)
Dept: FAMILY MEDICINE | Facility: CLINIC | Age: 61
End: 2021-07-27
Payer: COMMERCIAL

## 2021-07-27 VITALS
HEIGHT: 74 IN | SYSTOLIC BLOOD PRESSURE: 126 MMHG | WEIGHT: 230.1 LBS | OXYGEN SATURATION: 97 % | DIASTOLIC BLOOD PRESSURE: 64 MMHG | RESPIRATION RATE: 18 BRPM | HEART RATE: 75 BPM | TEMPERATURE: 98.1 F | BODY MASS INDEX: 29.53 KG/M2

## 2021-07-27 DIAGNOSIS — C43.9 METASTATIC MALIGNANT MELANOMA (H): ICD-10-CM

## 2021-07-27 DIAGNOSIS — E78.00 ELEVATED CHOLESTEROL: Primary | ICD-10-CM

## 2021-07-27 DIAGNOSIS — F51.01 PRIMARY INSOMNIA: ICD-10-CM

## 2021-07-27 DIAGNOSIS — K14.8 TONGUE DISCOLORATION: ICD-10-CM

## 2021-07-27 PROCEDURE — 99214 OFFICE O/P EST MOD 30 MIN: CPT | Performed by: INTERNAL MEDICINE

## 2021-07-27 RX ORDER — FENOFIBRATE 160 MG/1
160 TABLET ORAL DAILY
Qty: 90 TABLET | Refills: 4 | Status: SHIPPED | OUTPATIENT
Start: 2021-07-27 | End: 2022-09-01

## 2021-07-27 RX ORDER — TRAZODONE HYDROCHLORIDE 50 MG/1
50 TABLET, FILM COATED ORAL AT BEDTIME
Qty: 90 TABLET | Refills: 4 | Status: SHIPPED | OUTPATIENT
Start: 2021-07-27 | End: 2022-10-06

## 2021-07-27 ASSESSMENT — MIFFLIN-ST. JEOR: SCORE: 1923.48

## 2021-07-27 ASSESSMENT — PAIN SCALES - GENERAL: PAINLEVEL: NO PAIN (0)

## 2021-07-27 NOTE — PROGRESS NOTES
Assessment & Plan     (E78.00) Elevated cholesterol  (primary encounter diagnosis)  Comment: TG have been in 900 range without treatment; much improved.  Resume Fenofibrate.    Plan: fenofibrate (TRIGLIDE/LOFIBRA) 160 MG tablet        Resume med; ordered for a year    (F51.01) Primary insomnia  Comment: discussed Trazodone and Advil PM; using Trazodone nightly with good benefit,   Plan: traZODone (DESYREL) 50 MG tablet          (K14.8) Tongue discoloration  Comment: pt reporting need to brush tongue 2 times per day. He has brushed tongue today so normal appearing today. Reprinted ENT referral which he received at .   Plan:     (C79.9) Metastatic malignant melanoma (H)  Comment: following at regular intervals with derm. No recurrence.   Plan: follows with derm on a regular basis.     Review of the result(s) of each unique test - labs reviewed, print out provided.   Prescription drug management  30 minutes spent on the date of the encounter doing chart review, history and exam, documentation and further activities per the note       MEDICATIONS:   Orders Placed This Encounter   Medications     traZODone (DESYREL) 50 MG tablet     Sig: Take 1 tablet (50 mg) by mouth At Bedtime     Dispense:  90 tablet     Refill:  4     fenofibrate (TRIGLIDE/LOFIBRA) 160 MG tablet     Sig: Take 1 tablet (160 mg) by mouth daily     Dispense:  90 tablet     Refill:  4          - Continue other medications without change  Regular exercise    Return in about 1 year (around 7/27/2022) for cholesterol, sleep.    Karen Robbins MD  Internal Medicine   [facility] ROLAND Cardenas is a 60 year old who presents for the following health issues     HPI     Hyperlipidemia Follow-Up      Are you regularly taking any medication or supplement to lower your cholesterol?   Yes- Fenofibrate    Are you having muscle aches or other side effects that you think could be caused by your cholesterol  "lowering medication?  No      How many servings of fruits and vegetables do you eat daily?  2-3    On average, how many sweetened beverages do you drink each day (Examples: soda, juice, sweet tea, etc.  Do NOT count diet or artificially sweetened beverages)?   3    How many days per week do you exercise enough to make your heart beat faster? 3 or less    How many minutes a day do you exercise enough to make your heart beat faster? 20 - 29  How many days per week do you miss taking your medication? 1    What makes it hard for you to take your medications?  remembering to take      Review of Systems   CONSTITUTIONAL: NEGATIVE for fever, chills, change in weight  INTEGUMENTARY/SKIN: hx of melanoma; follows closely with derm; no recurrence.   ENT/MOUTH: NEGATIVE for ear, mouth and throat problems  RESP: NEGATIVE for significant cough or SOB  CV: NEGATIVE for chest pain, palpitations or peripheral edema  GI: NEGATIVE for nausea, abdominal pain, heartburn, or change in bowel habits  MUSCULOSKELETAL: NEGATIVE for significant arthralgias or myalgia  NEURO: NEGATIVE for weakness, dizziness or paresthesias  ENDOCRINE: reviewed lipids, Fenofibrate well tolerated; has been without med ~2 weeks;   PSYCHIATRIC: NEGATIVE for changes in mood or affect      Objective    /64 (BP Location: Right arm, Patient Position: Chair, Cuff Size: Adult Regular)   Pulse 75   Temp 98.1  F (36.7  C) (Oral)   Resp 18   Ht 1.88 m (6' 2\")   Wt 104.4 kg (230 lb 1.6 oz)   SpO2 97%   BMI 29.54 kg/m    Body mass index is 29.54 kg/m .  Physical Exam   GENERAL: healthy, alert and no distress  HENT: oropharynx clear, oral mucous membranes moist and tongue without discoloration; ( brushes it regularly)   NECK: no adenopathy, no asymmetry, masses, or scars and thyroid normal to palpation  RESP: lungs clear to auscultation - no rales, rhonchi or wheezes  CV: regular rate and rhythm, normal S1 S2, no S3 or S4, no murmur, click or rub, no peripheral " edema and peripheral pulses strong  ABDOMEN: soft, nontender, no hepatosplenomegaly, no masses and bowel sounds normal  MS: no gross musculoskeletal defects noted, no edema  NEURO: Normal strength and tone, mentation intact and speech normal  PSYCH: mentation appears normal, affect normal/bright

## 2021-08-02 ENCOUNTER — TELEPHONE (OUTPATIENT)
Dept: OTOLARYNGOLOGY | Facility: CLINIC | Age: 61
End: 2021-08-02

## 2021-08-02 NOTE — TELEPHONE ENCOUNTER
"Since the pt is not established with our clinic and not seen a provider we couldn't triage or go thru symptoms. Redirected to his PCP. He was told he needs to stop eating sweets and bread. He was calling to see \"if it was serious and if had a month to live\". Reiterated he needs to discuss this with his PCP until he is seen with our clinic.     Di Marie, LPN    "

## 2021-08-02 NOTE — TELEPHONE ENCOUNTER
[facility] Call Center    Phone Message    May a detailed message be left on voicemail: yes     Reason for Call: Symptoms or Concerns     If patient has red-flag symptoms, warm transfer to triage line    Current symptom or concern: ear Pain, Tongue is gold    Symptoms have been present for:1 month    Has patient previously been seen for this?Yes    By: Dr Robbins    Date: 7/27/2021     Are there any new or worsening symptoms? Yes      Action Taken: Message routed to:  [facility] ent ENT    Travel Screening: Not Applicable

## 2021-08-03 NOTE — TELEPHONE ENCOUNTER
FUTURE VISIT INFORMATION      FUTURE VISIT INFORMATION:    Date: 10/28/21    Time: 3 PM    Location: [facility]  REFERRAL INFORMATION:    Referring provider:  SHAMEKA Galvan    Referring providers clinic:  [facility]    Reason for visit/diagnosis Acute Ear Pain, gold tongue    RECORDS REQUESTED FROM:       Clinic name Comments Records Status Imaging Status   [facility] 7/27/21, 4/5/18 - [facility] OV with Dr. Rosendo Almonte 5/25/21 -  OV with [facility] 7/12/17 - MRI [facility] PACs

## 2021-09-03 ENCOUNTER — TELEPHONE (OUTPATIENT)
Dept: FAMILY MEDICINE | Facility: CLINIC | Age: 61
End: 2021-09-03

## 2021-09-03 NOTE — TELEPHONE ENCOUNTER
Called patient, advised that he will need a visit of some sort. States he will go to urgent care.

## 2021-09-03 NOTE — TELEPHONE ENCOUNTER
Reason for Call: Request for an order or referral:    Order or referral being requested: Covid-19 testing    Date needed: as soon as possible    Has the patient been seen by the PCP for this problem? NO    Additional comments: Pt states that he has not been feeling well for the last 4 days. Pt states that his symptoms are tightness in chest making it hard to breathe, chills, sore throat, runny nose, ocassional cough. Pt states that he feels he might have Covid-19 and would like to be tested so he knows if he can visit with people this weekend. Please give pt a call when order is placed.     Phone number Patient can be reached at:  Home number on file [phone] (home)    Best Time:  anytime    Can we leave a detailed message on this number?  YES    Call taken on 9/3/2021 at 12:59 PM by Candie Whaley

## 2021-10-21 NOTE — PATIENT INSTRUCTIONS
1. You were seen in the ENT Clinic today by Dr. Delatorre.  If you have any questions or concerns after your appointment, please call   - Option 1: ENT Clinic: [phone]   - Option 2: Di (Dr. Delatorre's Nurse): [phone]         Laina(Dr. Delatorre's Nurse): [phone]    2.   Referral to Maxillofacial clinic    3 Referral to ARIADNA Marie LPN  [facility] - Otolaryngology    The patient presents with a history of discoloration of the posterior mobile tongue. This appears to be the result of gastroesophageal reflux. He will be referred to the Oral Disorders Clinic with our Maxillofacial Specialists and he will be referred to the Gastroenterology specialists to assess and managed gastroesophageal reflux.

## 2021-10-24 ENCOUNTER — HEALTH MAINTENANCE LETTER (OUTPATIENT)
Age: 61
End: 2021-10-24

## 2021-10-28 ENCOUNTER — OFFICE VISIT (OUTPATIENT)
Dept: OTOLARYNGOLOGY | Facility: CLINIC | Age: 61
End: 2021-10-28
Attending: PHYSICIAN ASSISTANT
Payer: COMMERCIAL

## 2021-10-28 ENCOUNTER — PRE VISIT (OUTPATIENT)
Dept: OTOLARYNGOLOGY | Facility: CLINIC | Age: 61
End: 2021-10-28

## 2021-10-28 VITALS
BODY MASS INDEX: 29.52 KG/M2 | HEIGHT: 74 IN | WEIGHT: 230 LBS | HEART RATE: 74 BPM | OXYGEN SATURATION: 94 % | TEMPERATURE: 97.5 F

## 2021-10-28 DIAGNOSIS — K21.00 GASTROESOPHAGEAL REFLUX DISEASE WITH ESOPHAGITIS WITHOUT HEMORRHAGE: Primary | ICD-10-CM

## 2021-10-28 DIAGNOSIS — K14.8 DISCOLORATION OF TONGUE: ICD-10-CM

## 2021-10-28 PROCEDURE — 31575 DIAGNOSTIC LARYNGOSCOPY: CPT | Performed by: OTOLARYNGOLOGY

## 2021-10-28 PROCEDURE — 99243 OFF/OP CNSLTJ NEW/EST LOW 30: CPT | Mod: 25 | Performed by: OTOLARYNGOLOGY

## 2021-10-28 ASSESSMENT — MIFFLIN-ST. JEOR: SCORE: 1918.02

## 2021-10-28 ASSESSMENT — PAIN SCALES - GENERAL: PAINLEVEL: NO PAIN (0)

## 2021-10-28 NOTE — NURSING NOTE
"Chief Complaint   Patient presents with     Consult     acute ear pain . no pain at this time      Pulse 74, temperature 97.5  F (36.4  C), height 1.88 m (6' 2\"), weight 104.3 kg (230 lb), SpO2 94 %.    Sohan Shaw LPN    "

## 2021-10-28 NOTE — PROGRESS NOTES
The patient presents with a history of discoloration of the tongue. He reports that this is improved by brushing his tongue. He reports that his symptoms developed recently. He denies pain associated with these findings. The patient denies sinusitis, facial pain, nasal obstruction or purulent nasal discharge, but he reports mucous in the back of this throat. The patient denies chronic or recurrent tonsillitis, chronic or recurrent pharyngitis. The patient denies otalgia, otorrhea, eustachian tube dysfunction, ear infections, dizziness or tinnitus.       This patient is seen in consultation at the request of Andres Pepper PA-C.     All other systems were reviewed and they are either negative or they are not directly pertinent to this Otolaryngology examination.      Past Medical History:    Past Medical History:   Diagnosis Date     Hyperlipidemia        Past Surgical History:    Past Surgical History:   Procedure Laterality Date     APPENDECTOMY      age 16     BIOPSY NODE SENTINEL Left 7/18/2017    Procedure: BIOPSY NODE SENTINEL;  Wide Local Excision of Left Arm Melanoma and [place] Lymph Node Biopsy;  Surgeon: Lele Hoyos MD;  Location:  OR     ORTHOPEDIC SURGERY      left ankle       Medications:      Current Outpatient Medications:      cyanocobalamin (VITAMIN B-12) 1000 MCG tablet, Take 1 tablet (1,000 mcg) by mouth daily, Disp: , Rfl:      fenofibrate (TRIGLIDE/LOFIBRA) 160 MG tablet, Take 1 tablet (160 mg) by mouth daily, Disp: 90 tablet, Rfl: 4     fish oil-omega-3 fatty acids (OMEGA 3) 1000 MG capsule, Take 1 capsule by mouth daily., Disp: 90 capsule, Rfl: 3     Melatonin 10 MG TABS tablet, Take 10 mg by mouth At Bedtime, Disp: , Rfl:      Multiple Vitamins-Minerals (ONE-A-DAY ENERGY) TABS, Take  by mouth., Disp: , Rfl:      sildenafil (VIAGRA) 100 MG tablet, Take 0.5-1 tablets ( mg) by mouth daily as needed (erectile dysfunction), Disp: 12 tablet, Rfl: 1     traZODone (DESYREL) 50 MG tablet,  Take 1 tablet (50 mg) by mouth At Bedtime, Disp: 90 tablet, Rfl: 4     vitamin D3 (CHOLECALCIFEROL) 50 mcg (2000 units) tablet, Take 1 tablet (50 mcg) by mouth daily, Disp:  , Rfl:     Current Facility-Administered Medications:      lidocaine 1% with EPINEPHrine 1:100,000 injection 3 mL, 3 mL, Intradermal, Once, Rosa Jaquez MD    Allergies:    Dust mites    Physical Examination:    The patient is a well developed, well nourished male in no apparent distress.  He is normocepahlic, atraumatic with pupils equally round and reactive to light.    Oral Cavity Examination: Yellow discoloration of the posterior aspect of the mobile tongue with no masses or lesions, examination performed with a flexible fiberoptic scope.   Nasal Examination: Normal Mucosa with no masses or lesions  Ear Examination: Ear canals clear, tympanic membranes and middle ear spaces normal  Neurological Examination: Facial nerve function intact and symmetric  Integumentary Examination: No lesions on the skin of the head or neck  Neck Examination: No masses or lesions, no lymphadenopathy  Endocrine Examination: Normal thyroid examination  Flexible Fiberoptic Laryngoscopy:  Normal nasopharynx, pyriform sinuses, epiglottis, valleculae, false vocal cords, true vocal cords, and larynx.  Normal motion of the vocal cords with no lesions, masses, nodules, or polyps bilaterally.     Assessment and Plan:    The patient presents with a history of discoloration of the posterior mobile tongue. This appears to be the result of gastroesophageal reflux. He will be referred to the Oral Disorders Clinic with our Maxillofacial Specialists and he will be referred to the Gastroenterology specialists to assess and managed gastroesophageal reflux.      CC: Andres Pepper PA-C

## 2021-10-28 NOTE — LETTER
10/28/2021       RE: Ronald Ingram  [address] [place] [address]     Dear Colleague,    Thank you for referring your patient, Ronald Ingram, to the [facility] [place] [facility]. Please see a copy of my visit note below.    The patient presents with a history of discoloration of the tongue. He reports that this is improved by brushing his tongue. He reports that his symptoms developed recently. He denies pain associated with these findings. The patient denies sinusitis, facial pain, nasal obstruction or purulent nasal discharge, but he reports mucous in the back of this throat. The patient denies chronic or recurrent tonsillitis, chronic or recurrent pharyngitis. The patient denies otalgia, otorrhea, eustachian tube dysfunction, ear infections, dizziness or tinnitus.       This patient is seen in consultation at the request of Andres Pepper PA-C.     All other systems were reviewed and they are either negative or they are not directly pertinent to this Otolaryngology examination.      Past Medical History:    Past Medical History:   Diagnosis Date     Hyperlipidemia        Past Surgical History:    Past Surgical History:   Procedure Laterality Date     APPENDECTOMY      age 16     BIOPSY NODE SENTINEL Left 7/18/2017    Procedure: BIOPSY NODE SENTINEL;  Wide Local Excision of Left Arm Melanoma and [place] Lymph Node Biopsy;  Surgeon: Lele Hoyos MD;  Location:  OR     ORTHOPEDIC SURGERY      left ankle       Medications:      Current Outpatient Medications:      cyanocobalamin (VITAMIN B-12) 1000 MCG tablet, Take 1 tablet (1,000 mcg) by mouth daily, Disp: , Rfl:      fenofibrate (TRIGLIDE/LOFIBRA) 160 MG tablet, Take 1 tablet (160 mg) by mouth daily, Disp: 90 tablet, Rfl: 4     fish oil-omega-3 fatty acids (OMEGA 3) 1000 MG capsule, Take 1 capsule by mouth daily., Disp: 90 capsule, Rfl: 3     Melatonin 10 MG TABS  tablet, Take 10 mg by mouth At Bedtime, Disp: , Rfl:      Multiple Vitamins-Minerals (ONE-A-DAY ENERGY) TABS, Take  by mouth., Disp: , Rfl:      sildenafil (VIAGRA) 100 MG tablet, Take 0.5-1 tablets ( mg) by mouth daily as needed (erectile dysfunction), Disp: 12 tablet, Rfl: 1     traZODone (DESYREL) 50 MG tablet, Take 1 tablet (50 mg) by mouth At Bedtime, Disp: 90 tablet, Rfl: 4     vitamin D3 (CHOLECALCIFEROL) 50 mcg (2000 units) tablet, Take 1 tablet (50 mcg) by mouth daily, Disp:  , Rfl:     Current Facility-Administered Medications:      lidocaine 1% with EPINEPHrine 1:100,000 injection 3 mL, 3 mL, Intradermal, Once, Rosa Jaquez MD    Allergies:    Dust mites    Physical Examination:    The patient is a well developed, well nourished male in no apparent distress.  He is normocepahlic, atraumatic with pupils equally round and reactive to light.    Oral Cavity Examination: Yellow discoloration of the posterior aspect of the mobile tongue with no masses or lesions, examination performed with a flexible fiberoptic scope.   Nasal Examination: Normal Mucosa with no masses or lesions  Ear Examination: Ear canals clear, tympanic membranes and middle ear spaces normal  Neurological Examination: Facial nerve function intact and symmetric  Integumentary Examination: No lesions on the skin of the head or neck  Neck Examination: No masses or lesions, no lymphadenopathy  Endocrine Examination: Normal thyroid examination  Flexible Fiberoptic Laryngoscopy:  Normal nasopharynx, pyriform sinuses, epiglottis, valleculae, false vocal cords, true vocal cords, and larynx.  Normal motion of the vocal cords with no lesions, masses, nodules, or polyps bilaterally.     Assessment and Plan:    The patient presents with a history of discoloration of the posterior mobile tongue. This appears to be the result of gastroesophageal reflux. He will be referred to the Oral Disorders Clinic with our Maxillofacial Specialists and  he will be referred to the Gastroenterology specialists to assess and managed gastroesophageal reflux.      CC: Andres Pepper PA-C        Again, thank you for allowing me to participate in the care of your patient.      Sincerely,    Emanuel Delatorre MD

## 2021-11-03 NOTE — TELEPHONE ENCOUNTER
REFERRAL INFORMATION:    Referring Provider:  Dr. Emanuel Delatorre     Referring Clinic: [facility]     Reason for Visit/Diagnosis: GERD     FUTURE VISIT INFORMATION:    Appointment Date: 2/8/2022    Appointment Time: 2 PM      NOTES STATUS DETAILS   OFFICE NOTE from Referring Provider Internal 10/28/2021 Office visit with Dr. Delatorre     OFFICE NOTE from Other Specialist Internal 10/18/16 Office visit with Dr. Karen Robbins ([facility])      HOSPITAL DISCHARGE SUMMARY/  ED VISITS N/A    OPERATIVE REPORT N/A    MEDICATION LIST Internal         ENDOSCOPY  N/A    COLONOSCOPY Received 2/29/12 ([facility])    ERCP N/A    EUS N/A    STOOL TESTING N/A    PERTINENT LABS Internal    PATHOLOGY REPORTS (RELATED) N/A    IMAGING (CT, MRI, EGD, MRCP, Small Bowel Follow Through/SBT, MR/CT Enterography) N/A

## 2022-01-24 ENCOUNTER — MYC MEDICAL ADVICE (OUTPATIENT)
Dept: GASTROENTEROLOGY | Facility: CLINIC | Age: 62
End: 2022-01-24
Payer: COMMERCIAL

## 2022-01-31 NOTE — TELEPHONE ENCOUNTER
Called to remind patient of their upcoming appointment with our GI clinic, on Tuesday 1/8/2022 at 2PM with Jennifer Hanson. This appointment is scheduled as an in-person appt. Please arrive 15 minutes early to check in for your appointment. , if your appointment is virtual (video or telephone) you need to be in [place] for the visit. To reschedule or cancel patient to call [phone].    Called and LM    Josselyn De Los Santos MA

## 2022-02-08 ENCOUNTER — PRE VISIT (OUTPATIENT)
Dept: GASTROENTEROLOGY | Facility: CLINIC | Age: 62
End: 2022-02-08

## 2022-02-08 ENCOUNTER — VIRTUAL VISIT (OUTPATIENT)
Dept: GASTROENTEROLOGY | Facility: CLINIC | Age: 62
End: 2022-02-08
Attending: OTOLARYNGOLOGY
Payer: COMMERCIAL

## 2022-02-08 DIAGNOSIS — K21.9 GASTROESOPHAGEAL REFLUX DISEASE, UNSPECIFIED WHETHER ESOPHAGITIS PRESENT: ICD-10-CM

## 2022-02-08 DIAGNOSIS — Z12.11 ENCOUNTER FOR SCREENING COLONOSCOPY: ICD-10-CM

## 2022-02-08 DIAGNOSIS — K14.8 TONGUE DISCOLORATION: Primary | ICD-10-CM

## 2022-02-08 PROCEDURE — 99203 OFFICE O/P NEW LOW 30 MIN: CPT | Mod: 95 | Performed by: PHYSICIAN ASSISTANT
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
## 2022-02-08 NOTE — PATIENT INSTRUCTIONS
It was a pleasure taking care of you today.  I've included a brief summary of our discussion and care plan from today's visit below.  Please review this information with your primary care provider.  ______________________________________________________________________    My recommendations are summarized as follows:    --Upper endoscopy with Bravo procedure  --Colonoscopy  --would recommend switching sleep aid to diphenhydramine without NSAIDs as he does not have pain at night.   -- please see scheduling information provided below     Return to GI Clinic in 2 months to review your progress.    ______________________________________________________________________    How do I schedule labs, imaging studies, or procedures that were ordered in clinic today?     Labs: To schedule lab appointment at the [facility], use my chart or call [phone]. If you have a [facility] lab closer to home where you are regularly seen you can give them a call.     Procedures: If a colonoscopy, upper endoscopy, breath test, esophageal manometry, or pH impedence was ordered today, our endoscopy team will call you to schedule this. If you have not heard from our endoscopy team within a week, please call [phone] to schedule.     Imaging Studies: If you were scheduled for a CT scan, X-ray, MRI, ultrasound, HIDA scan or other imaging study, please call [phone] to have this scheduled.     Referral: If a referral to another specialty was ordered, expect a phone call or follow instructions above. If you have not heard from anyone regarding your referral in a week, please call our clinic to check the status.     Who do I call with any questions after my visit?  Please be in touch if there are any further questions that arise following today's visit.  There are multiple ways to contact your gastroenterology care team.        During business hours, you may reach a Gastroenterology nurse at [phone]      To schedule  or reschedule an appointment, please call [phone].       You can always send a secure message through [employer].  [employer] messages are answered by your nurse or doctor typically within 24 hours.  Please allow extra time on weekends and holidays.        For urgent/emergent questions after business hours, you may reach the on-call GI Fellow by contacting the [facility]  at [phone].     How will I get the results of any tests ordered?    You will receive all of your results.  If you have signed up for [employer], any tests ordered at your visit will be available to you after your provider reviews them.  Typically this takes 1-2 weeks.  If there are urgent results that require a change in your care plan, your provider or nurse will call you to discuss the next steps.      What is [employer]?  [employer] is a secure way for you to access all of your healthcare records from the [facility].  It is a web based computer program, so you can sign on to it from any location.  It also allows you to send secure messages to your care team.  I recommend signing up for [employer] access if you have not already done so and are comfortable with using a computer.      How to I schedule a follow-up visit?  If you did not schedule a follow-up visit today, please call [phone] to schedule a follow-up office visit.      Sincerely,    Jennifer Hanson PA-C  Division of Gastroenterology, Hepatology & Nutrition  [facility]
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
## 2022-02-13 ENCOUNTER — HEALTH MAINTENANCE LETTER (OUTPATIENT)
Age: 62
End: 2022-02-13

## 2022-02-16 ENCOUNTER — TELEPHONE (OUTPATIENT)
Dept: GASTROENTEROLOGY | Facility: CLINIC | Age: 62
End: 2022-02-16
Payer: COMMERCIAL

## 2022-02-16 DIAGNOSIS — Z11.59 ENCOUNTER FOR SCREENING FOR OTHER VIRAL DISEASES: Primary | ICD-10-CM

## 2022-02-16 NOTE — TELEPHONE ENCOUNTER
Screening Questions  Blue=prep questions Red=location Green=sedation   1. Are you active on mychart? Yes     2. What insurance is in the chart? BCBS      3.  Ordering/Referring Provider: Jennifer Hanson      4. BMI 30.1, If greater than 40 review exclusion criteria also will need EXTENDED PREP    5.  Respiratory Screening (If yes to any of the following HOSPITAL setting only):     Do you use daily home oxygen? N  Do you have mod to severe Obstructive Sleep Apnea? N (can be seen at [facility] or hospital setting)    Do you have Pulmonary Hypertension? N   Do you have UNCONTROLLED asthma? N    6. Have you had a heart or lung transplant? N  (If yes, please review exclusion criteria)    7. Are you currently on dialysis?N  (If yes, schedule in HOSPITAL setting only)(If yes, please send Golytely prep)    8. Do you have chronic kidney disease? N (If yes, please send Golytely prep)    9. Have you had a stroke or Transient ischemic attack (TIA) within 6 months? N (If yes, do not schedule at [facility])    10. In the past 6 months, have you had any heart related issues including cardiomyopathy or heart attack? N (If yes, please review exclusion criteria)           If yes, did it require cardiac stenting or other implantable device?N  (If yes, please review exclusion criteria)      11. Do you have any implantable devices in your body (pacemaker, defib, LVAD)? N (If yes, schedule at [facility])    12. Do you take nitroglycerin? If yes, how often? N (if yes, schedule at HOSPITAL setting)    13. Are you currently taking any blood thinners?N (If yes- inform patient to follow up with PCP or provider for follow up instructions)     14. Are you a diabetic? N (If yes, please send Golytely prep)    15. (Females) Are you currently pregnant?   If yes, how many weeks?      16. Are you taking any prescription pain medications on a routine schedule? N If yes, MAC sedation and patient will need EXTENDED PREP.    17. Do you have any chemical dependencies  such as alcohol, street drugs, or methadone? N If yes, MAC sedation     18. Do you have any history of post-traumatic stress syndrome, severe anxiety or history of psychosis? N  If yes, MAC sedation.     19. Do you transfer independently? Y    20.  Do you have any issues with constipation? N   If yes, pt will need EXTENDED PREP     21. Preferred Pharmacy for Pre Prescription [id number] [id number]    Scheduling Details    Which Colonoscopy Prep was Sent?:    Type of Procedure Scheduled: Upper/lower endo   Surgeon: Thien   Date of Procedure: 03/09/2022  [place] [facility] [place]er (Please ask for phone number if not scheduled by patient): .Ronald       Sedation Type: MAC   Conscious Sedation- Needs  for 6 hours after the procedure  MAC/General-Needs  for 24 hours after procedure    Pre-op Required at [facility] [place], [place] and OR for MAC sedation: n  (if yes advise patient they will need a pre-op prior to procedure)      Informed patient they will need an adult  Y  Cannot take any type of public or medical transportation alone    Pre-Procedure Covid test to be completed at [facility] or Externally: Y    Confirmed Nurse will call to complete assessment Y    Additional comments: N   (DE RISAEN'S PATIENTS NEED EXTENDED PREP)

## 2022-03-02 ENCOUNTER — TELEPHONE (OUTPATIENT)
Dept: GASTROENTEROLOGY | Facility: CLINIC | Age: 62
End: 2022-03-02

## 2022-03-07 ENCOUNTER — LAB (OUTPATIENT)
Dept: LAB | Facility: CLINIC | Age: 62
End: 2022-03-07
Payer: COMMERCIAL

## 2022-03-07 DIAGNOSIS — Z11.59 ENCOUNTER FOR SCREENING FOR OTHER VIRAL DISEASES: ICD-10-CM

## 2022-03-07 PROCEDURE — U0003 INFECTIOUS AGENT DETECTION BY NUCLEIC ACID (DNA OR RNA); SEVERE ACUTE RESPIRATORY SYNDROME CORONAVIRUS 2 (SARS-COV-2) (CORONAVIRUS DISEASE [COVID-19]), AMPLIFIED PROBE TECHNIQUE, MAKING USE OF HIGH THROUGHPUT TECHNOLOGIES AS DESCRIBED BY CMS-2020-01-R: HCPCS

## 2022-03-07 PROCEDURE — U0005 INFEC AGEN DETEC AMPLI PROBE: HCPCS

## 2022-03-08 ENCOUNTER — ANESTHESIA EVENT (OUTPATIENT)
Dept: SURGERY | Facility: AMBULATORY SURGERY CENTER | Age: 62
End: 2022-03-08
Payer: COMMERCIAL

## 2022-03-08 LAB — SARS-COV-2 RNA RESP QL NAA+PROBE: NEGATIVE

## 2022-03-08 NOTE — TELEPHONE ENCOUNTER
Attempted to contact patient for pre assessment questions. No answer.     Left message to return call to [phone] #2    Linda Green RN      
Patient scheduled for colonoscopy/EGD wBRAVO on 3/9/22.     Covid test scheduled: 3/7/22    Arrival time: 1230    Facility location: [facility]    Sedation type: MAC    Indication for procedure: gerd and screening     Anticoagulations? no     Bowel prep recommendation: Miralax/Magnesium citrate/Dulcolax     PPIs - hold 7 days prior for BRAVO    Pre visit planning completed.    Linda Green RN    
Returning pt's call.    Second attempt for pre-assessment prior to upcoming colonoscopy/EGD with Bravo.    No answer.  Left message to return call [phone] #2 on home and mobile number listed in pt's chart.     [employer] message sent.    Ashtyn Cherry RN    
car

## 2022-03-08 NOTE — ANESTHESIA PREPROCEDURE EVALUATION
Anesthesia Pre-Procedure Evaluation    Patient: Ronald Ingram   MRN: [id number] : 1960        Procedure : Procedure(s):  COLONOSCOPY  ESOPHAGOGASTRODUODENOSCOPY, WITH BRAVO PH MONITORING DEVICE INSERTION          Past Medical History:   Diagnosis Date     Hyperlipidemia       Past Surgical History:   Procedure Laterality Date     APPENDECTOMY      age 16     BIOPSY NODE SENTINEL Left 2017    Procedure: BIOPSY NODE SENTINEL;  Wide Local Excision of Left Arm Melanoma and [place] Lymph Node Biopsy;  Surgeon: Lele Hoyos MD;  Location: [facility] OR     ORTHOPEDIC SURGERY      left ankle      Allergies   Allergen Reactions     Dust Mites      Nasal cavity tingles       Social History     Tobacco Use     Smoking status: Never Smoker     Smokeless tobacco: Never Used   Substance Use Topics     Alcohol use: Yes     Comment: limited      Wt Readings from Last 1 Encounters:   10/28/21 104.3 kg (230 lb)           Physical Exam    Airway        Mallampati: I   TM distance: > 3 FB   Neck ROM: full   Mouth opening: > 3 cm    Respiratory Devices and Support         Dental  no notable dental history         Cardiovascular   cardiovascular exam normal          Pulmonary   pulmonary exam normal                OUTSIDE LABS:  CBC:   Lab Results   Component Value Date    WBC 6.2 2020    WBC 6.1 2015    HGB 14.4 2020    HGB 15.7 2015    HCT 43.3 2020    HCT 45.6 2015     2020     2015     BMP:   Lab Results   Component Value Date     2021     2020    POTASSIUM 4.0 2021    POTASSIUM 4.3 2020    CHLORIDE 108 2021    CHLORIDE 109 2020    CO2 26 2021    CO2 25 2020    BUN 17 2021    BUN 20 2020    CR 1.23 2021    CR 1.19 2020    GLC 90 2021    GLC 91 2020     COAGS: No results found for: PTT, INR, FIBR  POC:   Lab Results   Component Value Date    BG 96 2017      HEPATIC:   Lab Results   Component Value Date    ALBUMIN 4.1 07/23/2021    PROTTOTAL 7.4 07/23/2021    ALT 32 07/23/2021    AST 18 07/23/2021    ALKPHOS 37 (L) 07/23/2021    BILITOTAL 0.5 07/23/2021     OTHER:   Lab Results   Component Value Date    A1C 5.1 11/04/2020    ENRIQUE 9.1 07/23/2021    TSH 1.29 11/04/2020    CRP 0.5 01/25/2018       Anesthesia Plan    ASA Status:  2   NPO Status:  NPO Appropriate    Anesthesia Type: MAC.     - Reason for MAC: straight local not clinically adequate   Induction: Intravenous, Propofol.   Maintenance: TIVA.        Consents    Anesthesia Plan(s) and associated risks, benefits, and realistic alternatives discussed. Questions answered and patient/representative(s) expressed understanding.    - Discussed:     - Discussed with:  Patient      - Extended Intubation/Ventilatory Support Discussed: No.      - Patient is DNR/DNI Status: No    Use of blood products discussed: No .     Postoperative Care       PONV prophylaxis: Ondansetron (or other 5HT-3), Background Propofol Infusion     Comments:                Gabriel Trinidad MD

## 2022-03-09 ENCOUNTER — ANESTHESIA (OUTPATIENT)
Dept: SURGERY | Facility: AMBULATORY SURGERY CENTER | Age: 62
End: 2022-03-09
Payer: COMMERCIAL

## 2022-03-09 ENCOUNTER — HOSPITAL ENCOUNTER (OUTPATIENT)
Facility: AMBULATORY SURGERY CENTER | Age: 62
End: 2022-03-09
Attending: INTERNAL MEDICINE
Payer: COMMERCIAL

## 2022-03-09 VITALS
WEIGHT: 235 LBS | RESPIRATION RATE: 16 BRPM | BODY MASS INDEX: 31.14 KG/M2 | SYSTOLIC BLOOD PRESSURE: 137 MMHG | DIASTOLIC BLOOD PRESSURE: 81 MMHG | TEMPERATURE: 97 F | HEIGHT: 73 IN | OXYGEN SATURATION: 97 % | HEART RATE: 75 BPM

## 2022-03-09 VITALS — HEART RATE: 71 BPM

## 2022-03-09 DIAGNOSIS — Z11.59 ENCOUNTER FOR SCREENING FOR OTHER VIRAL DISEASES: Primary | ICD-10-CM

## 2022-03-09 LAB
COLONOSCOPY: NORMAL
UPPER GI ENDOSCOPY: NORMAL

## 2022-03-09 PROCEDURE — 45380 COLONOSCOPY AND BIOPSY: CPT | Mod: 33

## 2022-03-09 PROCEDURE — 88305 TISSUE EXAM BY PATHOLOGIST: CPT | Mod: TC | Performed by: INTERNAL MEDICINE

## 2022-03-09 PROCEDURE — 88305 TISSUE EXAM BY PATHOLOGIST: CPT | Mod: 26 | Performed by: PATHOLOGY

## 2022-03-09 PROCEDURE — 43239 EGD BIOPSY SINGLE/MULTIPLE: CPT

## 2022-03-09 PROCEDURE — 91035 G-ESOPH REFLX TST W/ELECTROD: CPT

## 2022-03-09 RX ORDER — NALOXONE HYDROCHLORIDE 0.4 MG/ML
0.2 INJECTION, SOLUTION INTRAMUSCULAR; INTRAVENOUS; SUBCUTANEOUS
Status: DISCONTINUED | OUTPATIENT
Start: 2022-03-09 | End: 2022-03-10 | Stop reason: HOSPADM

## 2022-03-09 RX ORDER — SIMETHICONE
LIQUID (ML) MISCELLANEOUS PRN
Status: DISCONTINUED | OUTPATIENT
Start: 2022-03-09 | End: 2022-03-09 | Stop reason: HOSPADM

## 2022-03-09 RX ORDER — NALOXONE HYDROCHLORIDE 0.4 MG/ML
0.4 INJECTION, SOLUTION INTRAMUSCULAR; INTRAVENOUS; SUBCUTANEOUS
Status: DISCONTINUED | OUTPATIENT
Start: 2022-03-09 | End: 2022-03-10 | Stop reason: HOSPADM

## 2022-03-09 RX ORDER — PROPOFOL 10 MG/ML
INJECTION, EMULSION INTRAVENOUS CONTINUOUS PRN
Status: DISCONTINUED | OUTPATIENT
Start: 2022-03-09 | End: 2022-03-09

## 2022-03-09 RX ORDER — ONDANSETRON 2 MG/ML
4 INJECTION INTRAMUSCULAR; INTRAVENOUS
Status: COMPLETED | OUTPATIENT
Start: 2022-03-09 | End: 2022-03-09

## 2022-03-09 RX ORDER — PROPOFOL 10 MG/ML
INJECTION, EMULSION INTRAVENOUS PRN
Status: DISCONTINUED | OUTPATIENT
Start: 2022-03-09 | End: 2022-03-09

## 2022-03-09 RX ORDER — ONDANSETRON 2 MG/ML
4 INJECTION INTRAMUSCULAR; INTRAVENOUS EVERY 6 HOURS PRN
Status: DISCONTINUED | OUTPATIENT
Start: 2022-03-09 | End: 2022-03-10 | Stop reason: HOSPADM

## 2022-03-09 RX ORDER — LIDOCAINE HYDROCHLORIDE 20 MG/ML
INJECTION, SOLUTION INFILTRATION; PERINEURAL PRN
Status: DISCONTINUED | OUTPATIENT
Start: 2022-03-09 | End: 2022-03-09

## 2022-03-09 RX ORDER — LIDOCAINE 40 MG/G
CREAM TOPICAL
Status: DISCONTINUED | OUTPATIENT
Start: 2022-03-09 | End: 2022-03-09 | Stop reason: HOSPADM

## 2022-03-09 RX ORDER — SODIUM CHLORIDE, SODIUM LACTATE, POTASSIUM CHLORIDE, CALCIUM CHLORIDE 600; 310; 30; 20 MG/100ML; MG/100ML; MG/100ML; MG/100ML
500 INJECTION, SOLUTION INTRAVENOUS CONTINUOUS
Status: DISCONTINUED | OUTPATIENT
Start: 2022-03-09 | End: 2022-03-09 | Stop reason: HOSPADM

## 2022-03-09 RX ORDER — ONDANSETRON 4 MG/1
4 TABLET, ORALLY DISINTEGRATING ORAL EVERY 6 HOURS PRN
Status: DISCONTINUED | OUTPATIENT
Start: 2022-03-09 | End: 2022-03-10 | Stop reason: HOSPADM

## 2022-03-09 RX ORDER — GLYCOPYRROLATE 0.2 MG/ML
INJECTION, SOLUTION INTRAMUSCULAR; INTRAVENOUS PRN
Status: DISCONTINUED | OUTPATIENT
Start: 2022-03-09 | End: 2022-03-09

## 2022-03-09 RX ORDER — PROCHLORPERAZINE MALEATE 10 MG
10 TABLET ORAL EVERY 6 HOURS PRN
Status: DISCONTINUED | OUTPATIENT
Start: 2022-03-09 | End: 2022-03-10 | Stop reason: HOSPADM

## 2022-03-09 RX ORDER — FLUMAZENIL 0.1 MG/ML
0.2 INJECTION, SOLUTION INTRAVENOUS
Status: DISCONTINUED | OUTPATIENT
Start: 2022-03-09 | End: 2022-03-10 | Stop reason: HOSPADM

## 2022-03-09 RX ADMIN — ONDANSETRON 4 MG: 2 INJECTION INTRAMUSCULAR; INTRAVENOUS at 14:15

## 2022-03-09 RX ADMIN — SODIUM CHLORIDE, SODIUM LACTATE, POTASSIUM CHLORIDE, CALCIUM CHLORIDE 1000 ML: 600; 310; 30; 20 INJECTION, SOLUTION INTRAVENOUS at 13:52

## 2022-03-09 RX ADMIN — PROPOFOL 30 MG: 10 INJECTION, EMULSION INTRAVENOUS at 14:17

## 2022-03-09 RX ADMIN — GLYCOPYRROLATE 0.1 MG: 0.2 INJECTION, SOLUTION INTRAMUSCULAR; INTRAVENOUS at 14:15

## 2022-03-09 RX ADMIN — PROPOFOL 70 MG: 10 INJECTION, EMULSION INTRAVENOUS at 14:15

## 2022-03-09 RX ADMIN — PROPOFOL 200 MCG/KG/MIN: 10 INJECTION, EMULSION INTRAVENOUS at 14:15

## 2022-03-09 RX ADMIN — LIDOCAINE HYDROCHLORIDE 80 MG: 20 INJECTION, SOLUTION INFILTRATION; PERINEURAL at 14:15

## 2022-03-09 RX ADMIN — PROPOFOL 150 MCG/KG/MIN: 10 INJECTION, EMULSION INTRAVENOUS at 14:38

## 2022-03-09 NOTE — DISCHARGE INSTRUCTIONS
Instructions for Your Upper Endoscopy with Bravo Capsule Placement    After the procedure  ? You may burp up air left in your stomach.  ? You may feel drowsy or a little dizzy from the medicine.   ? Your throat may feel numb. One hour after the exam, swallow a small amount of cool water. If you can swallow easily, you may go back to your regular diet and medicines.  ? Your throat may be sore for the rest of the day. Throat lozenges or ice chips may help.  ? Do not drive for 24 hours.    Bravo Capsule Instructions  The capsule placed in your food pipe will record acid levels over 96 hours. You may do your usual activities during this time. To help get clear results, follow these directions:  ? Do not take aspirin or anti-inflammatory pain medicines (ibuprofen, naproxin, Motrin, Advil, Nuprin, Aleve)  ? Do not take acid reducers (Aciphex, Axid, cimetidine, esomeprazole, famotidine, Losec, metoclopramide, Nexium, nizatidine, omeprazole, pantoprazole, Pepcid, Prilosec, Propulsid, Protonix, rabeprazole, ranitidine, Reglan, Tagamet and Zantac).  ? Eat no more than 3 to 4 meals a day (no gum, hard candy or snacking in between meals). Stay upright for at least 2 hours after dinner, if you can.  ? Refer to the time shown on your recorder, not your clock or watch.  ? If the  beeps, you may be more than 3 feet away from the  or near other electronics. Hold it to your chest for 30 seconds to reset it. You will hear a beep and the light will flash for 3 seconds to let you know the event was recorded.  ? If the recorder is on standby (backlight is off), press any button to turn it back on. (Nothing will be recorded.) Next, press the desired button to record an event.    How to record  ? Meals: Press the Meal button on your recorder at the start of a meal; it will stay lit. Press it again at the end of the meal to turn it off. Write the start and end times in your diary.  ? Beverages (other than water): Press the  Meal button. Finish drinking within 25 minutes. Write the start and end times in your diary.  ? Lying down and getting up: Press the Supine button when you lie down; it will stay lit. Press it again when you get up (even if just getting up to use the bathroom). The light will turn off. Write the start and end times in your diary.  ? Symptoms: Press the Symptom button, and write the time in your diary. If symptoms lasts longer than 15 minutes, press the button again.  Symptoms may include:  o Belly pain, chest pain or heartburn  o Coughing or wheezing  o Burping or bringing swallowed food back up into your mouth,  o Feeling sick to your stomach or throwing up  o Trouble swallowing.  ? Medicines: Log any medicine times in your diary. (No need to press a button.) Take Acetaminophen (Tylenol) for discomfort. Do not take aspirin, anti-inflammatories (ibuprofen) or antacids. Ask your pharmacist if you have questions.    Caring for the recorder  ? Please keep the unit in its case at all times. Handle it with care.  o Do not drop it  o Do not get it wet. You may take a shower or tub bath, but leave the recorder on a counter nearby within 3 feet.  o Never turn off the monitor or disconnect it. It will turn off by itself after 96 hours.  o If you take the recorder off while sleeping, place it on a stable surface near your chest and within an arm's reach.    After the test is complete  ? The capsule will pass naturally out of your body within 7 to 10 days with a bowel movement.  ? The capsule contains a small magnet. Do not have an MRI test for 30 days. An MRI could harm you if the capsule has not passed out of your body.  ? Bring the recorder and diary back to [facility] at [address] [place] [address]. 3rd Floor Non-invasive GI Procedures.  Do not send in the mail.    Call us at once if you have:  ? Unusual pain or problems swallowing, unusual stomach or chest pain.  ? Vomit that looks like coffee  grounds or black or bloody stools (bowel movements).  ? A fever above 100.6  F (37.5  C) when taken under the tongue.    Test results  ? You will receive your results in 7 to 10 business days by phone, letter or MyChart.    For questions or appointments, call:  [facility]: [phone], option 2  Monday through Friday, 8 a.m. to 4:30 p.m.  (If it is after hours, call [phone]. Ask for the GI fellow resident on call.)

## 2022-03-09 NOTE — ANESTHESIA POSTPROCEDURE EVALUATION
Patient: Ronald Ingram    Procedure: Procedure(s):  COLONOSCOPY, WITH POLYPECTOMY  ESOPHAGOGASTRODUODENOSCOPY, WITH BRAVO PH MONITORING DEVICE INSERTION       Anesthesia Type:  MAC    Note:  Disposition: Outpatient   Postop Pain Control: Uneventful            Sign Out: Well controlled pain   PONV: No   Neuro/Psych: Uneventful            Sign Out: Acceptable/Baseline neuro status   Airway/Respiratory: Uneventful            Sign Out: Acceptable/Baseline resp. status   CV/Hemodynamics: Uneventful            Sign Out: Acceptable CV status; No obvious hypovolemia; No obvious fluid overload   Other NRE: NONE   DID A NON-ROUTINE EVENT OCCUR? No           Last vitals:  Vitals Value Taken Time   /81 03/09/22 1548   Temp 36.1  C (97  F) 03/09/22 1548   Pulse     Resp 16 03/09/22 1548   SpO2 97 % 03/09/22 1548       Electronically Signed By: Jm Dawkins MD  March 9, 2022  4:20 PM

## 2022-03-09 NOTE — ANESTHESIA CARE TRANSFER NOTE
Patient: Ronald Ingram    Procedure: Procedure(s):  COLONOSCOPY, WITH POLYPECTOMY  ESOPHAGOGASTRODUODENOSCOPY, WITH BRAVO PH MONITORING DEVICE INSERTION       Diagnosis: Encounter for screening colonoscopy [Z12.11]  Gastroesophageal reflux disease, unspecified whether esophagitis present [K21.9]  Diagnosis Additional Information: No value filed.    Anesthesia Type:   MAC     Note:    Oropharynx: oropharynx clear of all foreign objects  Level of Consciousness: drowsy  Oxygen Supplementation: room air    Independent Airway: airway patency satisfactory and stable  Dentition: dentition unchanged  Vital Signs Stable: post-procedure vital signs reviewed and stable  Report to RN Given: handoff report given  Patient transferred to: Phase II  Comments: Vital signs per nursing documentation.     Handoff Report: Identifed the Patient, Identified the Reponsible Provider, Reviewed the pertinent medical history, Discussed the surgical course, Reviewed Intra-OP anesthesia mangement and issues during anesthesia, Set expectations for post-procedure period and Allowed opportunity for questions and acknowledgement of understanding      Vitals:  Vitals Value Taken Time   BP     Temp     Pulse     Resp 14    SpO2 96%        Electronically Signed By: YISEL Tovar CRNA  March 9, 2022  3:16 PM

## 2022-03-09 NOTE — H&P
Ronald K Byron  [id number]  male  61 year old      Reason for procedure/surgery: egd/bravo/colonoscopy reflux, screening    Patient Active Problem List   Diagnosis     Mixed hyperlipidemia     Labral tear of shoulder- left; work related     CARDIOVASCULAR SCREENING; LDL GOAL LESS THAN 160     Pain in joint involving ankle and foot     Ankle joint stiffness     Right shoulder pain     Grief     AK (actinic keratosis)     Solar lentigo     Seborrheic keratosis     Melanoma of left upper arm (H)     SK (seborrheic keratosis)     Metastatic malignant melanoma (H)     Elevated cholesterol     Tongue discoloration     Primary insomnia       Past Surgical History:    Past Surgical History:   Procedure Laterality Date     APPENDECTOMY      age 16     BIOPSY NODE SENTINEL Left 7/18/2017    Procedure: BIOPSY NODE SENTINEL;  Wide Local Excision of Left Arm Melanoma and [place] Lymph Node Biopsy;  Surgeon: Lele Hoyos MD;  Location: [facility] OR     ORTHOPEDIC SURGERY      left ankle       Past Medical History:   Past Medical History:   Diagnosis Date     Hyperlipidemia        Social History:   Social History     Tobacco Use     Smoking status: Never Smoker     Smokeless tobacco: Never Used   Substance Use Topics     Alcohol use: Yes     Comment: limited       Family History:   Family History   Problem Relation Age of Onset     Cardiovascular Father      Diabetes Father      Heart Disease Father      Alcohol/Drug Brother      Alcohol/Drug Brother        Allergies:   Allergies   Allergen Reactions     Dust Mites      Nasal cavity tingles        Active Medications:   Current Outpatient Medications   Medication Sig Dispense Refill     cyanocobalamin (VITAMIN B-12) 1000 MCG tablet Take 1 tablet (1,000 mcg) by mouth daily       fenofibrate (TRIGLIDE/LOFIBRA) 160 MG tablet Take 1 tablet (160 mg) by mouth daily 90 tablet 4     fish oil-omega-3 fatty acids (OMEGA 3) 1000 MG capsule Take 1 capsule by mouth daily. 90 capsule 3     Melatonin  "10 MG TABS tablet Take 10 mg by mouth At Bedtime       Multiple Vitamins-Minerals (ONE-A-DAY ENERGY) TABS Take  by mouth.       sildenafil (VIAGRA) 100 MG tablet Take 0.5-1 tablets ( mg) by mouth daily as needed (erectile dysfunction) 12 tablet 1     traZODone (DESYREL) 50 MG tablet Take 1 tablet (50 mg) by mouth At Bedtime 90 tablet 4     vitamin D3 (CHOLECALCIFEROL) 50 mcg (2000 units) tablet Take 1 tablet (50 mcg) by mouth daily         Systemic Review:   CONSTITUTIONAL: NEGATIVE for fever, chills, change in weight  ENT/MOUTH: NEGATIVE for ear, mouth and throat problems  RESP: NEGATIVE for significant cough or SOB  CV: NEGATIVE for chest pain, palpitations or peripheral edema    Physical Examination:   Vital Signs: /76   Pulse 75   Temp 97.4  F (36.3  C) (Skin)   Resp 18   Ht 1.854 m (6' 1\")   Wt 106.6 kg (235 lb)   SpO2 95%   BMI 31.00 kg/m    GENERAL: healthy, alert and no distress  NECK: no adenopathy, no asymmetry, masses, or scars  RESP: lungs clear to auscultation - no rales, rhonchi or wheezes  CV: regular rate and rhythm, normal S1 S2, no S3 or S4, no murmur, click or rub, no peripheral edema and peripheral pulses strong  ABDOMEN: soft, nontender, no hepatosplenomegaly, no masses and bowel sounds normal  MS: no gross musculoskeletal defects noted, no edema    Plan: Appropriate to proceed as scheduled.      Fredy Neumann DO  3/9/2022    PCP:  Karen Robbins    "

## 2022-03-10 LAB
PATH REPORT.COMMENTS IMP SPEC: NORMAL
PATH REPORT.COMMENTS IMP SPEC: NORMAL
PATH REPORT.FINAL DX SPEC: NORMAL
PATH REPORT.GROSS SPEC: NORMAL
PATH REPORT.MICROSCOPIC SPEC OTHER STN: NORMAL
PATH REPORT.RELEVANT HX SPEC: NORMAL
PHOTO IMAGE: NORMAL

## 2022-03-15 ENCOUNTER — ALLIED HEALTH/NURSE VISIT (OUTPATIENT)
Dept: GASTROENTEROLOGY | Facility: CLINIC | Age: 62
End: 2022-03-15
Payer: COMMERCIAL

## 2022-03-15 DIAGNOSIS — K21.9 GASTROESOPHAGEAL REFLUX DISEASE, UNSPECIFIED WHETHER ESOPHAGITIS PRESENT: Primary | ICD-10-CM

## 2022-03-15 PROCEDURE — 99207 PR NO CHARGE NURSE ONLY: CPT

## 2022-03-21 ENCOUNTER — PATIENT OUTREACH (OUTPATIENT)
Dept: GASTROENTEROLOGY | Facility: CLINIC | Age: 62
End: 2022-03-21
Payer: COMMERCIAL

## 2022-03-21 ENCOUNTER — PREP FOR PROCEDURE (OUTPATIENT)
Dept: GASTROENTEROLOGY | Facility: CLINIC | Age: 62
End: 2022-03-21
Payer: COMMERCIAL

## 2022-03-21 DIAGNOSIS — Z11.59 ENCOUNTER FOR SCREENING FOR OTHER VIRAL DISEASES: Primary | ICD-10-CM

## 2022-03-21 DIAGNOSIS — K22.9 SUBEPITHELIAL ESOPHAGEAL LESION: Primary | ICD-10-CM

## 2022-03-21 NOTE — CONFIDENTIAL NOTE
Called to discuss with patient.    Procedure/Imaging/Clinic: EGD/EUS possible EMR   Physician: yulia   Timing: next avail   Procedure length:60 min   Anesthesia: Gen   Dx: esophogeal subepithelial lesion   Tier: 3   Location: [facility] or [facility] OR     4/7/22, requesting to not be the first case of the day if possible  Explained they can expect a call from  for date and time of procedure, will need a , someone to stay with them for 24 hours and should stay in town for 24 hours (within 45 min of Hospital) post procedure    Patient needs to get pre-op physical completed. If outside [facility] system will need physical faxed to number [phone]   If you do not get a preop physical, your procedure could be cancelled, patient voiced understanding*    Preop Plan: PCP Dr Robbins, [facility], will make pre op within 30 days of procedure    Med Review    Blood thinner -  none  ASA - none  Diabetic - none    COVID test discussed: yes, discussed needed within 4 days of procedure    Patient Education r/t procedure:mychart    A pre-op nurse will call 1-2 days prior to the procedure.     Verbalized understanding of all instructions. All questions answered.      Case request placed, message routed to OR     Laura Cardona RN, BSN,   [facility]  Care coordinator   [phone]  Fax [phone]

## 2022-03-22 ENCOUNTER — TELEPHONE (OUTPATIENT)
Dept: GASTROENTEROLOGY | Facility: CLINIC | Age: 62
End: 2022-03-22
Payer: COMMERCIAL

## 2022-03-22 DIAGNOSIS — K21.00 GASTROESOPHAGEAL REFLUX DISEASE WITH ESOPHAGITIS WITHOUT HEMORRHAGE: Primary | ICD-10-CM

## 2022-03-22 RX ORDER — PANTOPRAZOLE SODIUM 40 MG/1
40 TABLET, DELAYED RELEASE ORAL DAILY
Qty: 60 TABLET | Refills: 5 | Status: SHIPPED | OUTPATIENT
Start: 2022-03-22 | End: 2022-12-06

## 2022-03-24 DIAGNOSIS — Z11.59 ENCOUNTER FOR SCREENING FOR OTHER VIRAL DISEASES: Primary | ICD-10-CM

## 2022-04-05 ENCOUNTER — LAB (OUTPATIENT)
Dept: LAB | Facility: CLINIC | Age: 62
End: 2022-04-05
Attending: INTERNAL MEDICINE
Payer: COMMERCIAL

## 2022-04-05 ENCOUNTER — OFFICE VISIT (OUTPATIENT)
Dept: DERMATOLOGY | Facility: CLINIC | Age: 62
End: 2022-04-05
Payer: COMMERCIAL

## 2022-04-05 DIAGNOSIS — C43.62 MELANOMA OF LEFT UPPER ARM (H): ICD-10-CM

## 2022-04-05 DIAGNOSIS — L57.0 AK (ACTINIC KERATOSIS): Primary | ICD-10-CM

## 2022-04-05 DIAGNOSIS — L82.1 SEBORRHEIC KERATOSIS: ICD-10-CM

## 2022-04-05 DIAGNOSIS — D22.9 MULTIPLE NEVI: ICD-10-CM

## 2022-04-05 DIAGNOSIS — Z11.59 ENCOUNTER FOR SCREENING FOR OTHER VIRAL DISEASES: ICD-10-CM

## 2022-04-05 PROCEDURE — 17000 DESTRUCT PREMALG LESION: CPT | Performed by: DERMATOLOGY

## 2022-04-05 PROCEDURE — U0005 INFEC AGEN DETEC AMPLI PROBE: HCPCS

## 2022-04-05 PROCEDURE — U0003 INFECTIOUS AGENT DETECTION BY NUCLEIC ACID (DNA OR RNA); SEVERE ACUTE RESPIRATORY SYNDROME CORONAVIRUS 2 (SARS-COV-2) (CORONAVIRUS DISEASE [COVID-19]), AMPLIFIED PROBE TECHNIQUE, MAKING USE OF HIGH THROUGHPUT TECHNOLOGIES AS DESCRIBED BY CMS-2020-01-R: HCPCS

## 2022-04-05 PROCEDURE — 17003 DESTRUCT PREMALG LES 2-14: CPT | Performed by: DERMATOLOGY

## 2022-04-05 PROCEDURE — 99214 OFFICE O/P EST MOD 30 MIN: CPT | Mod: 25 | Performed by: DERMATOLOGY

## 2022-04-05 RX ORDER — CALCIPOTRIENE 0.05 MG/ML
SOLUTION TOPICAL
Qty: 60 ML | Refills: 0 | Status: SHIPPED | OUTPATIENT
Start: 2022-04-05 | End: 2022-12-06

## 2022-04-05 RX ORDER — FLUOROURACIL 50 MG/G
CREAM TOPICAL
Qty: 40 G | Refills: 0 | Status: SHIPPED | OUTPATIENT
Start: 2022-04-05 | End: 2022-12-06

## 2022-04-05 NOTE — H&P (VIEW-ONLY)
Dermatology Problem List:  1. Melanoma, L upper arm Breslow at least 2.6 mm, >1 mitosis, + ulceration. Burdick node negative. Diagnosis 6/6/17.   2. Benign pigmented nevi   3. Monitoring macule on the R dorsal toe  4. Aks - calcipotriene +5FU in 4/22  5. Seborrheic keratoses  6. Tanning bed use history        CHIEF COMPLAINT:  Skin check.      HISTORY OF PRESENT ILLNESS:  Mr. Ingram is a 61-year-old male returning to Dermatology for a 6-month skin check in his history of melanoma.  He was last seen in 7/2021.  Since that time, he notes ongoing scaling areas on L ear. No painful or bleeding areas. Had a non healing sore from an actinic keratosis treated with liquid nitrogen at last visit, but this has now resolved.      Patient Active Problem List   Diagnosis     Mixed hyperlipidemia     Labral tear of shoulder- left; work related     CARDIOVASCULAR SCREENING; LDL GOAL LESS THAN 160     Pain in joint involving ankle and foot     Ankle joint stiffness     Right shoulder pain     Grief     AK (actinic keratosis)     Solar lentigo     Seborrheic keratosis     Melanoma of left upper arm (H)     SK (seborrheic keratosis)     Metastatic malignant melanoma (H)     Elevated cholesterol     Tongue discoloration     Primary insomnia       Allergies   Allergen Reactions     Dust Mites      Nasal cavity tingles          Current Outpatient Medications   Medication     cyanocobalamin (VITAMIN B-12) 1000 MCG tablet     fenofibrate (TRIGLIDE/LOFIBRA) 160 MG tablet     fish oil-omega-3 fatty acids (OMEGA 3) 1000 MG capsule     Melatonin 10 MG TABS tablet     Multiple Vitamins-Minerals (ONE-A-DAY ENERGY) TABS     traZODone (DESYREL) 50 MG tablet     vitamin D3 (CHOLECALCIFEROL) 50 mcg (2000 units) tablet     pantoprazole (PROTONIX) 40 MG EC tablet     sildenafil (VIAGRA) 100 MG tablet     Current Facility-Administered Medications   Medication     lidocaine 1% with EPINEPHrine 1:100,000 injection 3 mL            SOCIAL HISTORY:  The  patient helps watch his grandson age 1 year.  He has 2 sons.      PHYSICAL EXAMINATION:   GENERAL:  The patient is a healthy-appearing male in no distress.   HEENT:  Conjunctivae clear.   PULMONARY:  Breathing comfortably on room air.   ABDOMEN:  No abdominal distention.   SKIN:  Exam included the scalp, face, neck, chest, abdomen, back, arms, legs, hands, buttocks and genital area.  Skin exam normal except for as follows:   - No lymphadenopathy in the cervical, axillary or inguinal chains.   - Extensive flat-topped, waxy papules on central back, anterior chest and dorsal arms.   - Scattered light brown macules on chest, abdomen and back.   - 1.5 mm dark-brown macule on the left great toe.   - Light brown macules on the bilateral plantar feet  - R posterior upper arm with dark brown 3 mm macule  - Linear scar in the left shoulder without nodularity or pigmentation.   - Gritty papules on the bilateral temples, L helix, R temple with overlying crusting, bilateral cheeks diffusely.      ASSESSMENT AND PLAN:   1.  Seborrheic keratoses; benign hyperkeratotic papules.  No treatment advised.   2.  Benign pigmented nevi; no lesions of concern today.  We will continue to monitor.   3.  History of malignant melanoma, status post wide local excision without signs of recurrence.   4.  Actinic keratoses.  Two lesions treated with liquid nitrogen on the L helix and R temple for 10 sec freeze. Given the diffuse number of lesions recommended field treatment with 5FU and calcipotriene BID for 5 days. Noted that redness, scaling, skin pain, peeling will occur, but only in areas of actinic keratosis.   5.  Solar lentigines, chronic.  No treatment advised.      RTC 1 year, sooner prn.     Rosa Jaquez MD   of Dermatology  [facility]

## 2022-04-05 NOTE — LETTER
4/5/2022      RE: Ronald Ingram  [address] [facility] [address]       Dermatology Problem List:  1. Melanoma, L upper arm Breslow at least 2.6 mm, >1 mitosis, + ulceration. [place] node negative. Diagnosis 6/6/17.   2. Benign pigmented nevi   3. Monitoring macule on the R dorsal toe  4. Aks - calcipotriene +5FU in 4/22  5. Seborrheic keratoses  6. Tanning bed use history        CHIEF COMPLAINT:  Skin check.      HISTORY OF PRESENT ILLNESS:  Mr. Ingram is a 61-year-old male returning to Dermatology for a 6-month skin check in his history of melanoma.  He was last seen in 7/2021.  Since that time, he notes ongoing scaling areas on L ear. No painful or bleeding areas. Had a non healing sore from an actinic keratosis treated with liquid nitrogen at last visit, but this has now resolved.      Patient Active Problem List   Diagnosis     Mixed hyperlipidemia     Labral tear of shoulder- left; work related     CARDIOVASCULAR SCREENING; LDL GOAL LESS THAN 160     Pain in joint involving ankle and foot     Ankle joint stiffness     Right shoulder pain     Grief     AK (actinic keratosis)     Solar lentigo     Seborrheic keratosis     Melanoma of left upper arm (H)     SK (seborrheic keratosis)     Metastatic malignant melanoma (H)     Elevated cholesterol     Tongue discoloration     Primary insomnia       Allergies   Allergen Reactions     Dust Mites      Nasal cavity tingles          Current Outpatient Medications   Medication     cyanocobalamin (VITAMIN B-12) 1000 MCG tablet     fenofibrate (TRIGLIDE/LOFIBRA) 160 MG tablet     fish oil-omega-3 fatty acids (OMEGA 3) 1000 MG capsule     Melatonin 10 MG TABS tablet     Multiple Vitamins-Minerals (ONE-A-DAY ENERGY) TABS     traZODone (DESYREL) 50 MG tablet     vitamin D3 (CHOLECALCIFEROL) 50 mcg (2000 units) tablet     pantoprazole (PROTONIX) 40 MG EC tablet     sildenafil (VIAGRA) 100 MG tablet     Current Facility-Administered Medications   Medication     lidocaine  1% with EPINEPHrine 1:100,000 injection 3 mL            SOCIAL HISTORY:  The patient helps watch his grandson age 1 year.  He has 2 sons.      PHYSICAL EXAMINATION:   GENERAL:  The patient is a healthy-appearing male in no distress.   HEENT:  Conjunctivae clear.   PULMONARY:  Breathing comfortably on room air.   ABDOMEN:  No abdominal distention.   SKIN:  Exam included the scalp, face, neck, chest, abdomen, back, arms, legs, hands, buttocks and genital area.  Skin exam normal except for as follows:   - No lymphadenopathy in the cervical, axillary or inguinal chains.   - Extensive flat-topped, waxy papules on central back, anterior chest and dorsal arms.   - Scattered light brown macules on chest, abdomen and back.   - 1.5 mm dark-brown macule on the left great toe.   - Light brown macules on the bilateral plantar feet  - R posterior upper arm with dark brown 3 mm macule  - Linear scar in the left shoulder without nodularity or pigmentation.   - Gritty papules on the bilateral temples, L helix, R temple with overlying crusting, bilateral cheeks diffusely.      ASSESSMENT AND PLAN:   1.  Seborrheic keratoses; benign hyperkeratotic papules.  No treatment advised.   2.  Benign pigmented nevi; no lesions of concern today.  We will continue to monitor.   3.  History of malignant melanoma, status post wide local excision without signs of recurrence.   4.  Actinic keratoses.  Two lesions treated with liquid nitrogen on the L helix and R temple for 10 sec freeze. Given the diffuse number of lesions recommended field treatment with 5FU and calcipotriene BID for 5 days. Noted that redness, scaling, skin pain, peeling will occur, but only in areas of actinic keratosis.   5.  Solar lentigines, chronic.  No treatment advised.      RTC 1 year, sooner prn.     Rosa Jaquez MD   of Dermatology  [facility]

## 2022-04-05 NOTE — PATIENT INSTRUCTIONS
Pediatric Dermatology  [facility]  [address]  1st [place]  [address]  Phone: [phone]    Pediatric & Adult Dermatology  [facility]  [address]   2nd Floor  [facility] [address]  [phone]                  General information: Dr. Rosa Jaquez is a board-certified dermatologist with subspecialty certification in pediatric dermatology.     Scheduling and Nurse Triage: Dr. Jaquez sees pediatric patients on Mondays in [place] and adult and pediatric patients on Tuesdays in [place]. The remainder of the week she practices at the [facility]. Please call the above phone numbers to schedule or to talk to a nurse.     -For scheduling at the [place] or [place] locations, or to talk to the triage nurse please call the above phone number at the clinic where you were seen.     -For medication refills, please call your pharmacy.             -For 5 days use the calcipotriene first, then efudex thin layer to the whole face and ears. Apply twice daily.

## 2022-04-05 NOTE — PROGRESS NOTES
Dermatology Problem List:  1. Melanoma, L upper arm Breslow at least 2.6 mm, >1 mitosis, + ulceration. [place] node negative. Diagnosis 6/6/17.   2. Benign pigmented nevi   3. Monitoring macule on the R dorsal toe  4. Aks - calcipotriene +5FU in 4/22  5. Seborrheic keratoses  6. Tanning bed use history        CHIEF COMPLAINT:  Skin check.      HISTORY OF PRESENT ILLNESS:  Mr. Ingram is a 61-year-old male returning to Dermatology for a 6-month skin check in his history of melanoma.  He was last seen in 7/2021.  Since that time, he notes ongoing scaling areas on L ear. No painful or bleeding areas. Had a non healing sore from an actinic keratosis treated with liquid nitrogen at last visit, but this has now resolved.      Patient Active Problem List   Diagnosis     Mixed hyperlipidemia     Labral tear of shoulder- left; work related     CARDIOVASCULAR SCREENING; LDL GOAL LESS THAN 160     Pain in joint involving ankle and foot     Ankle joint stiffness     Right shoulder pain     Grief     AK (actinic keratosis)     Solar lentigo     Seborrheic keratosis     Melanoma of left upper arm (H)     SK (seborrheic keratosis)     Metastatic malignant melanoma (H)     Elevated cholesterol     Tongue discoloration     Primary insomnia       Allergies   Allergen Reactions     Dust Mites      Nasal cavity tingles          Current Outpatient Medications   Medication     cyanocobalamin (VITAMIN B-12) 1000 MCG tablet     fenofibrate (TRIGLIDE/LOFIBRA) 160 MG tablet     fish oil-omega-3 fatty acids (OMEGA 3) 1000 MG capsule     Melatonin 10 MG TABS tablet     Multiple Vitamins-Minerals (ONE-A-DAY ENERGY) TABS     traZODone (DESYREL) 50 MG tablet     vitamin D3 (CHOLECALCIFEROL) 50 mcg (2000 units) tablet     pantoprazole (PROTONIX) 40 MG EC tablet     sildenafil (VIAGRA) 100 MG tablet     Current Facility-Administered Medications   Medication     lidocaine 1% with EPINEPHrine 1:100,000 injection 3 mL            SOCIAL HISTORY:  The  patient helps watch his grandson age 1 year.  He has 2 sons.      PHYSICAL EXAMINATION:   GENERAL:  The patient is a healthy-appearing male in no distress.   HEENT:  Conjunctivae clear.   PULMONARY:  Breathing comfortably on room air.   ABDOMEN:  No abdominal distention.   SKIN:  Exam included the scalp, face, neck, chest, abdomen, back, arms, legs, hands, buttocks and genital area.  Skin exam normal except for as follows:   - No lymphadenopathy in the cervical, axillary or inguinal chains.   - Extensive flat-topped, waxy papules on central back, anterior chest and dorsal arms.   - Scattered light brown macules on chest, abdomen and back.   - 1.5 mm dark-brown macule on the left great toe.   - Light brown macules on the bilateral plantar feet  - R posterior upper arm with dark brown 3 mm macule  - Linear scar in the left shoulder without nodularity or pigmentation.   - Gritty papules on the bilateral temples, L helix, R temple with overlying crusting, bilateral cheeks diffusely.      ASSESSMENT AND PLAN:   1.  Seborrheic keratoses; benign hyperkeratotic papules.  No treatment advised.   2.  Benign pigmented nevi; no lesions of concern today.  We will continue to monitor.   3.  History of malignant melanoma, status post wide local excision without signs of recurrence.   4.  Actinic keratoses.  Two lesions treated with liquid nitrogen on the L helix and R temple for 10 sec freeze. Given the diffuse number of lesions recommended field treatment with 5FU and calcipotriene BID for 5 days. Noted that redness, scaling, skin pain, peeling will occur, but only in areas of actinic keratosis.   5.  Solar lentigines, chronic.  No treatment advised.      RTC 1 year, sooner prn.     Rosa Jaquez MD   of Dermatology  [facility]

## 2022-04-06 ENCOUNTER — OFFICE VISIT (OUTPATIENT)
Dept: FAMILY MEDICINE | Facility: CLINIC | Age: 62
End: 2022-04-06
Payer: COMMERCIAL

## 2022-04-06 ENCOUNTER — ANESTHESIA EVENT (OUTPATIENT)
Dept: SURGERY | Facility: CLINIC | Age: 62
End: 2022-04-06
Payer: COMMERCIAL

## 2022-04-06 VITALS
HEART RATE: 78 BPM | SYSTOLIC BLOOD PRESSURE: 111 MMHG | OXYGEN SATURATION: 97 % | TEMPERATURE: 98.6 F | BODY MASS INDEX: 30.12 KG/M2 | WEIGHT: 228.31 LBS | RESPIRATION RATE: 16 BRPM | DIASTOLIC BLOOD PRESSURE: 73 MMHG

## 2022-04-06 DIAGNOSIS — Z01.818 PREOP GENERAL PHYSICAL EXAM: Primary | ICD-10-CM

## 2022-04-06 DIAGNOSIS — K21.9 GASTROESOPHAGEAL REFLUX DISEASE, UNSPECIFIED WHETHER ESOPHAGITIS PRESENT: ICD-10-CM

## 2022-04-06 LAB — SARS-COV-2 RNA RESP QL NAA+PROBE: NEGATIVE

## 2022-04-06 PROCEDURE — 99214 OFFICE O/P EST MOD 30 MIN: CPT | Mod: 25 | Performed by: PHYSICIAN ASSISTANT

## 2022-04-06 PROCEDURE — 90471 IMMUNIZATION ADMIN: CPT | Performed by: PHYSICIAN ASSISTANT

## 2022-04-06 PROCEDURE — 90715 TDAP VACCINE 7 YRS/> IM: CPT | Performed by: PHYSICIAN ASSISTANT

## 2022-04-06 ASSESSMENT — LIFESTYLE VARIABLES: TOBACCO_USE: 0

## 2022-04-06 NOTE — ANESTHESIA PREPROCEDURE EVALUATION
Anesthesia Pre-Procedure Evaluation    Patient: Ronald Ingram   MRN: [id number] : 1960        Procedure : Procedure(s):  ENDOSCOPIC ULTRASOUND, ESOPHAGOSCOPY / UPPER GASTROINTESTINAL TRACT (GI)  ESOPHAGOGASTRODUODENOSCOPY, WITH MUCOSAL RESECTION          Past Medical History:   Diagnosis Date     Hyperlipidemia       Past Surgical History:   Procedure Laterality Date     APPENDECTOMY      age 16     BIOPSY NODE SENTINEL Left 2017    Procedure: BIOPSY NODE SENTINEL;  Wide Local Excision of Left Arm Melanoma and [place] Lymph Node Biopsy;  Surgeon: Lele Hoyos MD;  Location: [facility]     COLONOSCOPY N/A 3/9/2022    Procedure: COLONOSCOPY, WITH POLYPECTOMY;  Surgeon: Fredy Neumann DO;  Location: [facility] OR     ORTHOPEDIC SURGERY      left ankle      Allergies   Allergen Reactions     Dust Mites      Nasal cavity tingles       Social History     Tobacco Use     Smoking status: Never Smoker     Smokeless tobacco: Never Used   Substance Use Topics     Alcohol use: Yes     Comment: limited      Wt Readings from Last 1 Encounters:   22 103.6 kg (228 lb 5 oz)        Anesthesia Evaluation   Pt has had prior anesthetic.     No history of anesthetic complications       ROS/MED HX  ENT/Pulmonary:    (-) tobacco use and sleep apnea   Neurologic:  - neg neurologic ROS     Cardiovascular:     (+) Dyslipidemia -----Previous cardiac testing   Echo: Date: Results:    Stress Test: Date:  Results:  Interpretation Summary  The patient exercised 7:32 mm:ss on standard Raghu protocol.  There was a normal BP response to exercise.  The EKG portion of this stress test was negative for inducible ischemia (see  echo results below).  Normal resting wall motion and no stress-induced wall motion abnormality.    ECG Reviewed: Date: Results:    Cath: Date: Results:   (-) CAD   METS/Exercise Tolerance:     Hematologic:  - neg hematologic  ROS     Musculoskeletal:  - neg musculoskeletal ROS     GI/Hepatic:     (+) GERD,      Renal/Genitourinary:  - neg Renal ROS     Endo:  - neg endo ROS     Psychiatric/Substance Use:    (-) psychiatric history   Infectious Disease:       Malignancy:   (+) Malignancy, History of Skin.    Other:            Physical Exam    Airway        Mallampati: III   TM distance: > 3 FB   Neck ROM: full   Mouth opening: > 3 cm    Respiratory Devices and Support         Dental  no notable dental history     (+) implants      Cardiovascular   cardiovascular exam normal       Rhythm and rate: regular     Pulmonary   pulmonary exam normal        breath sounds clear to auscultation           OUTSIDE LABS:  CBC:   Lab Results   Component Value Date    WBC 6.2 11/04/2020    WBC 6.1 11/25/2015    HGB 14.4 11/04/2020    HGB 15.7 11/25/2015    HCT 43.3 11/04/2020    HCT 45.6 11/25/2015     11/04/2020     11/25/2015     BMP:   Lab Results   Component Value Date     07/23/2021     11/04/2020    POTASSIUM 4.0 07/23/2021    POTASSIUM 4.3 11/04/2020    CHLORIDE 108 07/23/2021    CHLORIDE 109 11/04/2020    CO2 26 07/23/2021    CO2 25 11/04/2020    BUN 17 07/23/2021    BUN 20 11/04/2020    CR 1.23 07/23/2021    CR 1.19 11/04/2020    GLC 90 07/23/2021    GLC 91 11/04/2020     COAGS: No results found for: PTT, INR, FIBR  POC:   Lab Results   Component Value Date    BGM 96 07/12/2017     HEPATIC:   Lab Results   Component Value Date    ALBUMIN 4.1 07/23/2021    PROTTOTAL 7.4 07/23/2021    ALT 32 07/23/2021    AST 18 07/23/2021    ALKPHOS 37 (L) 07/23/2021    BILITOTAL 0.5 07/23/2021     OTHER:   Lab Results   Component Value Date    A1C 5.1 11/04/2020    ENRIQUE 9.1 07/23/2021    TSH 1.29 11/04/2020    CRP 0.5 01/25/2018       Anesthesia Plan    ASA Status:  2   NPO Status:  NPO Appropriate    Anesthesia Type: General.     - Airway: ETT   Induction: Intravenous, Propofol.   Maintenance: Balanced.        Consents         - Extended Intubation/Ventilatory Support Discussed: No.      - Patient is DNR/DNI Status: No     Use of blood products discussed: No .     Postoperative Care    Pain management: Multi-modal analgesia.   PONV prophylaxis: Ondansetron (or other 5HT-3), Dexamethasone or Solumedrol     Comments:    Other Comments: Patient is counseled on the anesthesia plan and relevant anesthesia procedures including all risks and benefits. All patient questions were answered.             Yvon Ruiz MD
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
## 2022-04-06 NOTE — PATIENT INSTRUCTIONS
Preparing for Your Surgery  Getting started  A nurse will call you to review your health history and instructions. They will give you an arrival time based on your scheduled surgery time. Please be ready to share:    Your doctor's clinic name and phone number    Your medical, surgical and anesthesia history    A list of allergies and sensitivities    A list of medicines, including herbal treatments and over-the-counter drugs    Whether the patient has a legal guardian (ask how to send us the papers in advance)  Please tell us if you're pregnant--or if there's any chance you might be pregnant. Some surgeries may injure a fetus (unborn baby), so they require a pregnancy test. Surgeries that are safe for a fetus don't always need a test, and you can choose whether to have one.   If you have a child who's having surgery, please ask for a copy of Preparing for Your Child's Surgery.    Preparing for surgery    Within 30 days of surgery: Have a pre-op exam (sometimes called an H&P, or History and Physical). This can be done at a clinic or pre-operative center.  ? If you're having a , you may not need this exam. Talk to your care team.    At your pre-op exam, talk to your care team about all medicines you take. If you need to stop any medicines before surgery, ask when to start taking them again.  ? We do this for your safety. Many medicines can make you bleed too much during surgery. Some change how well surgery (anesthesia) drugs work.    Call your insurance company to let them know you're having surgery. (If you don't have insurance, call [phone].)    Call your clinic if there's any change in your health. This includes signs of a cold or flu (sore throat, runny nose, cough, rash, fever). It also includes a scrape or scratch near the surgery site.    If you have questions on the day of surgery, call your hospital or surgery center.  COVID testing  You may need to be tested for COVID-19 before having  surgery. If so, your surgical team will give you instructions for scheduling this test, separate from your preoperative history and physical.  Eating and drinking guidelines  For your safety: Unless your surgeon tells you otherwise, follow the guidelines below.    Eat and drink as usual until 8 hours before surgery. After that, no food or milk.    Drink clear liquids until 2 hours before surgery. These are liquids you can see through, like water, Gatorade and Propel Water. You may also have black coffee and tea (no cream or milk).    Nothing by mouth within 2 hours of surgery. This includes gum, candy and breath mints.    If you drink alcohol: Stop drinking it the night before surgery.    If your care team tells you to take medicine on the morning of surgery, it's okay to take it with a sip of water.  Preventing infection    Shower or bathe the night before and morning of your surgery. Follow the instructions your clinic gave you. (If no instructions, use regular soap.)    Don't shave or clip hair near your surgery site. We'll remove the hair if needed.    Don't smoke or vape the morning of surgery. You may chew nicotine gum up to 2 hours before surgery. A nicotine patch is okay.  ? Note: Some surgeries require you to completely quit smoking and nicotine. Check with your surgeon.    Your care team will make every effort to keep you safe from infection. We will:  ? Clean our hands often with soap and water (or an alcohol-based hand rub).  ? Clean the skin at your surgery site with a special soap that kills germs.  ? Give you a special gown to keep you warm. (Cold raises the risk of infection.)  ? Wear special hair covers, masks, gowns and gloves during surgery.  ? Give antibiotic medicine, if prescribed. Not all surgeries need antibiotics.  What to bring on the day of surgery    Photo ID and insurance card    Copy of your health care directive, if you have one    Glasses and hearing aides (bring cases)  ? You can't  wear contacts during surgery    Inhaler and eye drops, if you use them (tell us about these when you arrive)    CPAP machine or breathing device, if you use them    A few personal items, if spending the night    If you have . . .  ? A pacemaker, ICD (cardiac defibrillator) or other implant: Bring the ID card.  ? An implanted stimulator: Bring the remote control.  ? A legal guardian: Bring a copy of the certified (court-stamped) guardianship papers.  Please remove any jewelry, including body piercings. Leave jewelry and other valuables at home.  If you're going home the day of surgery    You must have a responsible adult drive you home. They should stay with you overnight as well.    If you don't have someone to stay with you, and you aren't safe to go home alone, we may keep you overnight. Insurance often won't pay for this.  After surgery  If it's hard to control your pain or you need more pain medicine, please call your surgeon's office.  Questions?   If you have any questions for your care team, list them here: _________________________________________________________________________________________________________________________________________________________________________ ____________________________________ ____________________________________ ____________________________________  For informational purposes only. Not to replace the advice of your health care provider. Copyright   2003, 2019 [facility]. All rights reserved. Clinically reviewed by Ruby Rain MD. [employer] [id number] - REV 07/21.
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
## 2022-04-07 ENCOUNTER — ANESTHESIA (OUTPATIENT)
Dept: SURGERY | Facility: CLINIC | Age: 62
End: 2022-04-07
Payer: COMMERCIAL

## 2022-04-07 ENCOUNTER — HOSPITAL ENCOUNTER (OUTPATIENT)
Facility: CLINIC | Age: 62
Discharge: HOME | End: 2022-04-07
Attending: INTERNAL MEDICINE | Admitting: INTERNAL MEDICINE
Payer: COMMERCIAL

## 2022-04-07 VITALS
WEIGHT: 233.2 LBS | RESPIRATION RATE: 20 BRPM | OXYGEN SATURATION: 99 % | DIASTOLIC BLOOD PRESSURE: 95 MMHG | BODY MASS INDEX: 30.91 KG/M2 | SYSTOLIC BLOOD PRESSURE: 135 MMHG | TEMPERATURE: 96.9 F | HEART RATE: 64 BPM | HEIGHT: 73 IN

## 2022-04-07 LAB — UPPER EUS: NORMAL

## 2022-04-07 PROCEDURE — 88305 TISSUE EXAM BY PATHOLOGIST: CPT | Mod: TC | Performed by: INTERNAL MEDICINE

## 2022-04-07 PROCEDURE — 710N000009 HC RECOVERY PHASE 1, LEVEL 1, PER MIN: Performed by: INTERNAL MEDICINE

## 2022-04-07 PROCEDURE — 258N000003 HC RX IP 258 OP 636: Performed by: NURSE ANESTHETIST, CERTIFIED REGISTERED

## 2022-04-07 PROCEDURE — 999N000141 HC STATISTIC PRE-PROCEDURE NURSING ASSESSMENT: Performed by: INTERNAL MEDICINE

## 2022-04-07 PROCEDURE — 710N000012 HC RECOVERY PHASE 2, PER MINUTE: Performed by: INTERNAL MEDICINE

## 2022-04-07 PROCEDURE — 250N000011 HC RX IP 250 OP 636: Performed by: NURSE ANESTHETIST, CERTIFIED REGISTERED

## 2022-04-07 PROCEDURE — 250N000025 HC SEVOFLURANE, PER MIN: Performed by: INTERNAL MEDICINE

## 2022-04-07 PROCEDURE — 250N000009 HC RX 250: Performed by: NURSE ANESTHETIST, CERTIFIED REGISTERED

## 2022-04-07 PROCEDURE — 258N000003 HC RX IP 258 OP 636: Performed by: ANESTHESIOLOGY

## 2022-04-07 PROCEDURE — 370N000017 HC ANESTHESIA TECHNICAL FEE, PER MIN: Performed by: INTERNAL MEDICINE

## 2022-04-07 PROCEDURE — 272N000002 HC OR SUPPLY OTHER OPNP: Performed by: INTERNAL MEDICINE

## 2022-04-07 PROCEDURE — 360N000075 HC SURGERY LEVEL 2, PER MIN: Performed by: INTERNAL MEDICINE

## 2022-04-07 RX ORDER — LIDOCAINE 40 MG/G
CREAM TOPICAL
Status: DISCONTINUED | OUTPATIENT
Start: 2022-04-07 | End: 2022-04-07 | Stop reason: HOSPADM

## 2022-04-07 RX ORDER — NALOXONE HYDROCHLORIDE 0.4 MG/ML
0.4 INJECTION, SOLUTION INTRAMUSCULAR; INTRAVENOUS; SUBCUTANEOUS
Status: DISCONTINUED | OUTPATIENT
Start: 2022-04-07 | End: 2022-04-07 | Stop reason: HOSPADM

## 2022-04-07 RX ORDER — LIDOCAINE HYDROCHLORIDE 20 MG/ML
INJECTION, SOLUTION INFILTRATION; PERINEURAL PRN
Status: DISCONTINUED | OUTPATIENT
Start: 2022-04-07 | End: 2022-04-07

## 2022-04-07 RX ORDER — ONDANSETRON 2 MG/ML
4 INJECTION INTRAMUSCULAR; INTRAVENOUS EVERY 6 HOURS PRN
Status: DISCONTINUED | OUTPATIENT
Start: 2022-04-07 | End: 2022-04-07 | Stop reason: HOSPADM

## 2022-04-07 RX ORDER — ONDANSETRON 2 MG/ML
INJECTION INTRAMUSCULAR; INTRAVENOUS PRN
Status: DISCONTINUED | OUTPATIENT
Start: 2022-04-07 | End: 2022-04-07

## 2022-04-07 RX ORDER — ONDANSETRON 2 MG/ML
4 INJECTION INTRAMUSCULAR; INTRAVENOUS EVERY 30 MIN PRN
Status: DISCONTINUED | OUTPATIENT
Start: 2022-04-07 | End: 2022-04-07 | Stop reason: HOSPADM

## 2022-04-07 RX ORDER — FENTANYL CITRATE 0.05 MG/ML
25 INJECTION, SOLUTION INTRAMUSCULAR; INTRAVENOUS EVERY 5 MIN PRN
Status: DISCONTINUED | OUTPATIENT
Start: 2022-04-07 | End: 2022-04-07 | Stop reason: HOSPADM

## 2022-04-07 RX ORDER — OXYCODONE HYDROCHLORIDE 5 MG/1
5 TABLET ORAL EVERY 4 HOURS PRN
Status: DISCONTINUED | OUTPATIENT
Start: 2022-04-07 | End: 2022-04-07 | Stop reason: HOSPADM

## 2022-04-07 RX ORDER — ONDANSETRON 4 MG/1
4 TABLET, ORALLY DISINTEGRATING ORAL EVERY 6 HOURS PRN
Status: DISCONTINUED | OUTPATIENT
Start: 2022-04-07 | End: 2022-04-07 | Stop reason: HOSPADM

## 2022-04-07 RX ORDER — ONDANSETRON 2 MG/ML
4 INJECTION INTRAMUSCULAR; INTRAVENOUS
Status: DISCONTINUED | OUTPATIENT
Start: 2022-04-07 | End: 2022-04-07 | Stop reason: HOSPADM

## 2022-04-07 RX ORDER — EPHEDRINE SULFATE 50 MG/ML
INJECTION, SOLUTION INTRAMUSCULAR; INTRAVENOUS; SUBCUTANEOUS PRN
Status: DISCONTINUED | OUTPATIENT
Start: 2022-04-07 | End: 2022-04-07

## 2022-04-07 RX ORDER — NALOXONE HYDROCHLORIDE 0.4 MG/ML
0.2 INJECTION, SOLUTION INTRAMUSCULAR; INTRAVENOUS; SUBCUTANEOUS
Status: DISCONTINUED | OUTPATIENT
Start: 2022-04-07 | End: 2022-04-07 | Stop reason: HOSPADM

## 2022-04-07 RX ORDER — PROPOFOL 10 MG/ML
INJECTION, EMULSION INTRAVENOUS PRN
Status: DISCONTINUED | OUTPATIENT
Start: 2022-04-07 | End: 2022-04-07

## 2022-04-07 RX ORDER — PROCHLORPERAZINE MALEATE 10 MG
10 TABLET ORAL EVERY 6 HOURS PRN
Status: DISCONTINUED | OUTPATIENT
Start: 2022-04-07 | End: 2022-04-07 | Stop reason: HOSPADM

## 2022-04-07 RX ORDER — ONDANSETRON 4 MG/1
4 TABLET, ORALLY DISINTEGRATING ORAL EVERY 30 MIN PRN
Status: DISCONTINUED | OUTPATIENT
Start: 2022-04-07 | End: 2022-04-07 | Stop reason: HOSPADM

## 2022-04-07 RX ORDER — SODIUM CHLORIDE, SODIUM LACTATE, POTASSIUM CHLORIDE, CALCIUM CHLORIDE 600; 310; 30; 20 MG/100ML; MG/100ML; MG/100ML; MG/100ML
INJECTION, SOLUTION INTRAVENOUS CONTINUOUS
Status: DISCONTINUED | OUTPATIENT
Start: 2022-04-07 | End: 2022-04-07 | Stop reason: HOSPADM

## 2022-04-07 RX ORDER — FLUMAZENIL 0.1 MG/ML
0.2 INJECTION, SOLUTION INTRAVENOUS
Status: DISCONTINUED | OUTPATIENT
Start: 2022-04-07 | End: 2022-04-07 | Stop reason: HOSPADM

## 2022-04-07 RX ORDER — DEXAMETHASONE SODIUM PHOSPHATE 4 MG/ML
INJECTION, SOLUTION INTRA-ARTICULAR; INTRALESIONAL; INTRAMUSCULAR; INTRAVENOUS; SOFT TISSUE PRN
Status: DISCONTINUED | OUTPATIENT
Start: 2022-04-07 | End: 2022-04-07

## 2022-04-07 RX ORDER — FENTANYL CITRATE 50 UG/ML
INJECTION, SOLUTION INTRAMUSCULAR; INTRAVENOUS PRN
Status: DISCONTINUED | OUTPATIENT
Start: 2022-04-07 | End: 2022-04-07

## 2022-04-07 RX ORDER — GLYCOPYRROLATE 0.2 MG/ML
INJECTION, SOLUTION INTRAMUSCULAR; INTRAVENOUS PRN
Status: DISCONTINUED | OUTPATIENT
Start: 2022-04-07 | End: 2022-04-07

## 2022-04-07 RX ORDER — NEOSTIGMINE METHYLSULFATE 1 MG/ML
VIAL (ML) INJECTION PRN
Status: DISCONTINUED | OUTPATIENT
Start: 2022-04-07 | End: 2022-04-07

## 2022-04-07 RX ORDER — HYDROMORPHONE HCL IN WATER/PF 6 MG/30 ML
0.2 PATIENT CONTROLLED ANALGESIA SYRINGE INTRAVENOUS EVERY 5 MIN PRN
Status: DISCONTINUED | OUTPATIENT
Start: 2022-04-07 | End: 2022-04-07 | Stop reason: HOSPADM

## 2022-04-07 RX ADMIN — DEXAMETHASONE SODIUM PHOSPHATE 4 MG: 4 INJECTION, SOLUTION INTRA-ARTICULAR; INTRALESIONAL; INTRAMUSCULAR; INTRAVENOUS; SOFT TISSUE at 07:42

## 2022-04-07 RX ADMIN — Medication 10 MG: at 07:34

## 2022-04-07 RX ADMIN — ROCURONIUM BROMIDE 30 MG: 50 INJECTION, SOLUTION INTRAVENOUS at 07:28

## 2022-04-07 RX ADMIN — SODIUM CHLORIDE, POTASSIUM CHLORIDE, SODIUM LACTATE AND CALCIUM CHLORIDE: 600; 310; 30; 20 INJECTION, SOLUTION INTRAVENOUS at 06:32

## 2022-04-07 RX ADMIN — PHENYLEPHRINE HYDROCHLORIDE 100 MCG: 10 INJECTION INTRAVENOUS at 08:02

## 2022-04-07 RX ADMIN — PHENYLEPHRINE HYDROCHLORIDE 100 MCG: 10 INJECTION INTRAVENOUS at 07:42

## 2022-04-07 RX ADMIN — ONDANSETRON 4 MG: 2 INJECTION INTRAMUSCULAR; INTRAVENOUS at 08:07

## 2022-04-07 RX ADMIN — PHENYLEPHRINE HYDROCHLORIDE 100 MCG: 10 INJECTION INTRAVENOUS at 07:52

## 2022-04-07 RX ADMIN — MIDAZOLAM 2 MG: 1 INJECTION INTRAMUSCULAR; INTRAVENOUS at 07:25

## 2022-04-07 RX ADMIN — GLYCOPYRROLATE 0.8 MG: 0.2 INJECTION, SOLUTION INTRAMUSCULAR; INTRAVENOUS at 08:07

## 2022-04-07 RX ADMIN — FENTANYL CITRATE 50 MCG: 50 INJECTION, SOLUTION INTRAMUSCULAR; INTRAVENOUS at 07:28

## 2022-04-07 RX ADMIN — LIDOCAINE HYDROCHLORIDE 100 MG: 20 INJECTION, SOLUTION INFILTRATION; PERINEURAL at 07:28

## 2022-04-07 RX ADMIN — FENTANYL CITRATE 50 MCG: 50 INJECTION, SOLUTION INTRAMUSCULAR; INTRAVENOUS at 07:43

## 2022-04-07 RX ADMIN — NEOSTIGMINE METHYLSULFATE 5 MG: 1 INJECTION, SOLUTION INTRAVENOUS at 08:07

## 2022-04-07 RX ADMIN — PROPOFOL 300 MG: 10 INJECTION, EMULSION INTRAVENOUS at 07:28

## 2022-04-07 NOTE — OP NOTE
Upper EUS 04/07/2022  7:48 AM [facility] [place] [address] [place]  [address]   _______________________________________________________________________________   Patient Name: Ronald Ingram              Procedure Date: 4/7/2022 7:48 AM   MRN: [id number]                       Account Number: [id number]   YOB: 1960             Admit Type: Outpatient   Age: 61                               Room: [place] [address]   Note Status: Finalized                Attending MD: Sukhdev Liang MD   Instrument Name: 427 ML--MH6 Radial   _______________________________________________________________________________       Procedure:                Upper EUS   Indications:              Esophageal deformity on endoscopy/Subepithelial                             tumor vs. extrinsic compression   Providers:                Sukdhev Liang MD   Referring MD:               Requesting Provider:      JONATHAN CAPPS   Medicines:                General Anesthesia   Complications:            No immediate complications. Estimated blood loss:                             Minimal.   _______________________________________________________________________________   Procedure:                After obtaining informed consent, the endoscope was                             passed under direct vision. Throughout the                             procedure, the patient's blood pressure, pulse, and                             oxygen saturations were monitored continuously. The                             Endoscope was introduced through the mouth, and                             advanced to the second part of duodenum. The upper                             EUS was accomplished without difficulty. The                             patient tolerated the procedure well.                                                                                     Findings:        ENDOSCOPIC FINDING: :        A single 8 mm subepithelial nodule  was found in the middle third of the        esophagus, 30 cm from the incisors.        The entire examined stomach was endoscopically normal.        The examined duodenum was endoscopically normal.        ENDOSONOGRAPHIC FINDING: :        An oval intramural (subepithelial) lesion was found in the middle third        of the esophagus. The lesion was hypoechoic. Sonographically, the origin        appeared to be within the deep mucosa/muscularis mucosa (Layer 2). The        nodule measured up to 3 mm in thickness and 6 mm wide. The        endosonographic borders were well-defined. An intact interface was seen        between the mass and the adjacent structures suggesting a lack of        invasion. Preparations were made for mucosal resection. Band ligator and        snare mucosal resection was performed. Resection and retrieval were        complete.                                                                                     Impression:               - ~8 mm subepithelial lesion seen at 30 cm in the                             mid-esophagus. On EUS, it was a hypoechoic lesion                             appeared to arise from the muscularis mucosa layer                             (Layer 2). For diagnostic and therapeutic purposes,                             the lesion was resected and retrieved en bloc using                             band EMR.                             - Normal stomach.                             - Normal examined duodenum.   Recommendation:           - Discharge patient to home (ambulatory).                             - Await path results.                             - Resume regular diet today as tolerated                             - Resume medications including PPI today

## 2022-04-07 NOTE — INTERVAL H&P NOTE
"I have reviewed the surgical (or preoperative) H&P that is linked to this encounter, and examined the patient. There are no significant changes    Clinical Conditions Present on Arrival:  Clinically Significant Risk Factors Present on Admission                    # Obesity: Estimated body mass index is 30.77 kg/m  as calculated from the following:    Height as of this encounter: 1.854 m (6' 1\").    Weight as of this encounter: 105.8 kg (233 lb 3.2 oz).       "

## 2022-04-07 NOTE — ANESTHESIA CARE TRANSFER NOTE
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    Anesthesia Type:   General     Note:    Oropharynx: oropharynx clear of all foreign objects and spontaneously breathing  Level of Consciousness: awake and drowsy  Oxygen Supplementation: face mask  Level of Supplemental Oxygen (L/min / FiO2): 4  Independent Airway: airway patency satisfactory and stable  Dentition: dentition unchanged  Vital Signs Stable: post-procedure vital signs reviewed and stable  Report to RN Given: handoff report given  Patient transferred to: PACU  Comments: Neuromuscular blockade reversed after TOF 4/4, spontaneous respirations, adequate tidal volumes, followed commands to voice, oropharynx suctioned with soft flexible catheter, extubated atraumatically, extubated with suction, airway patent after extubation.  Oxygen via facemask at 4 liters per minute to PACU. Oxygen tubing connected to wall O2 in PACU, SpO2, NiBP, and EKG monitors and alarms on and functioning, report on patient's clinical status given to PACU RN, RN questions answered.     Handoff Report: Identifed the Patient, Identified the Reponsible Provider, Reviewed the pertinent medical history, Discussed the surgical course, Reviewed Intra-OP anesthesia mangement and issues during anesthesia, Set expectations for post-procedure period and Allowed opportunity for questions and acknowledgement of understanding      Vitals:  Vitals Value Taken Time   /80 04/07/22 0818   Temp     Pulse 77 04/07/22 0820   Resp 0 04/07/22 0820   SpO2 98 % 04/07/22 0820   Vitals shown include unvalidated device data.    Electronically Signed By: YISEL Clark CRNA  April 7, 2022  8:21 AM

## 2022-04-07 NOTE — ANESTHESIA PROCEDURE NOTES
Airway       Patient location during procedure: OR       Procedure Start/Stop Times: 4/7/2022 7:30 AM  Staff -        Anesthesiologist:  Yvon Ruiz MD       CRNA: Xi Hayward APRN CRNA       Performed By: CRNA  Consent for Airway        Urgency: elective  Indications and Patient Condition       Indications for airway management: yvette-procedural       Induction type:intravenous       Mask difficulty assessment: 2 - vent by mask + OA or adjuvant +/- NMBA    Final Airway Details       Final airway type: endotracheal airway       Successful airway: ETT - single  Endotracheal Airway Details        ETT size (mm): 8.0       Cuffed: yes       Successful intubation technique: direct laryngoscopy       DL Blade Type: MAC 4       Grade View of Cords: 2       Adjucts: stylet       Position: Right       Measured from: gums/teeth       Secured at (cm): 23       Bite block used: Oral Airway (EGD bite block)    Post intubation assessment        Placement verified by: capnometry, equal breath sounds and chest rise        Number of attempts at approach: 1       Secured with: pink tape       Ease of procedure: easy       Dentition: Intact and Unchanged

## 2022-04-07 NOTE — DISCHARGE INSTRUCTIONS
Same Day Surgery Discharge Instructions for  Sedation and General Anesthesia       It's not unusual to feel dizzy, light-headed or faint for up to 24 hours after surgery or while taking pain medication.  If you have these symptoms: sit for a few minutes before standing and have someone assist you when you get up to walk or use the bathroom.      You should rest and relax for the next 24 hours. We recommend you make arrangements to have an adult stay with you for at least 24 hours after your discharge.  Avoid hazardous and strenuous activity.      DO NOT DRIVE any vehicle or operate mechanical equipment for 24 hours following the end of your surgery.  Even though you may feel normal, your reactions may be affected by the medication you have received.      Do not drink alcoholic beverages for 24 hours following surgery.       Slowly progress to your regular diet as you feel able. It's not unusual to feel nauseated and/or vomit after receiving anesthesia.  If you develop these symptoms, drink clear liquids (apple juice, ginger ale, broth, 7-up, etc. ) until you feel better.  If your nausea and vomiting persists for 24 hours, please notify your surgeon.        All narcotic pain medications, along with inactivity and anesthesia, can cause constipation. Drinking plenty of liquids and increasing fiber intake will help.      For any questions of a medical nature, call your surgeon.      Do not make important decisions for 24 hours.      If you had general anesthesia, you may have a sore throat for a couple of days related to the breathing tube used during surgery.  You may use Cepacol lozenges to help with this discomfort.  If it worsens or if you develop a fever, contact your surgeon.       If you feel your pain is not well managed with the pain medications prescribed by your surgeon, please contact your surgeon's office to let them know so they can address your concerns.       CoVid 19 Information    We want to give you  information regarding Covid. Please consult your primary care provider with any questions you might have.     Patient who have symptoms (cough, fever, or shortness of breath), need to isolate for 7 days from when symptoms started OR 72 hours after fever resolves (without fever reducing medications) AND improvement of respiratory symptoms (whichever is longer).      Isolate yourself at home (in own room/own bathroom if possible)    Do Not allow any visitors    Do Not go to work or school    Do Not go to [employer],  centers, shopping, or other public places.    Do Not shake hands.    Avoid close and intimate contact with others (hugging, kissing).    Follow CDC recommendations for household cleaning of frequently touched services.     After the initial 7 days, continue to isolate yourself from household members as much as possible. To continue decrease the risk of community spread and exposure, you and any members of your household should limit activities in public for 14 days after starting home isolation.     You can reference the following CDC link for helpful home isolation/care tips:  https://www.cdc.gov/coronavirus/2019-ncov/downloads/10Things.pdf    Protect Others:    Cover Your Mouth and Nose with a mask, disposable tissue or wash cloth to avoid spreading germs to others.    Wash your hands and face frequently with soap and water    Call Your Primary Doctor If: Breathing difficulty develops or you become worse.    For more information about COVID19 and options for caring for yourself at home, please visit the CDC website at https://www.cdc.gov/coronavirus/2019-ncov/about/steps-when-sick.html  For more options for care at [facility], please visit our website at [email or web address]    **If you have questions or concerns about your procedure,   call Dr. Liang at [phone]**

## 2022-04-07 NOTE — ANESTHESIA POSTPROCEDURE EVALUATION
Patient: Ronald Ingram    Procedure: Procedure(s):  ENDOSCOPIC ULTRASOUND, ESOPHAGOSCOPY / UPPER GASTROINTESTINAL TRACT (GI)  ESOPHAGOGASTRODUODENOSCOPY, WITH MUCOSAL RESECTION       Anesthesia Type:  General    Note:  Disposition: Outpatient   Postop Pain Control: Uneventful            Sign Out: Well controlled pain   PONV:    Neuro/Psych: Uneventful            Sign Out: Acceptable/Baseline neuro status   Airway/Respiratory: Uneventful            Sign Out: Acceptable/Baseline resp. status   CV/Hemodynamics: Uneventful            Sign Out: Acceptable CV status   Other NRE: NONE   DID A NON-ROUTINE EVENT OCCUR? No           Last vitals:  Vitals Value Taken Time   /67 04/07/22 0900   Temp 36.1  C (96.9  F) 04/07/22 0908   Pulse 68 04/07/22 0910   Resp 17 04/07/22 0911   SpO2 96 % 04/07/22 0909   Vitals shown include unvalidated device data.    Electronically Signed By: Yvon Ruiz MD  April 7, 2022  11:26 AM

## 2022-04-11 LAB
PATH REPORT.COMMENTS IMP SPEC: NORMAL
PATH REPORT.FINAL DX SPEC: NORMAL
PATH REPORT.GROSS SPEC: NORMAL
PATH REPORT.MICROSCOPIC SPEC OTHER STN: NORMAL
PATH REPORT.RELEVANT HX SPEC: NORMAL
PHOTO IMAGE: NORMAL

## 2022-04-11 PROCEDURE — 88305 TISSUE EXAM BY PATHOLOGIST: CPT | Mod: 26 | Performed by: PATHOLOGY

## 2022-04-12 NOTE — RESULT ENCOUNTER NOTE
Pathology from EGD reviewed. Esophageal nodule returned as a benign cyst. No follow up needed for this. I spoke to patient's wife Latanya on the phone who said he's still having some odynophagia but localized in his lower chest. Likely still related to edema at the resection site and probably exacerbated by ongoing acid reflux exposure. I recommended increase PPI to BID dosing for the next week and elevating the head of the bed. The area resected was small and I wouldn't expect significant stricturing long term. I told them to reach out to us if symptoms worsen or don't improve in a week.    Sukhdev Liang MD  [facility]  Division of Gastroenterology and Hepatology  [facility] [address] [place], [place] [address]

## 2022-05-19 DIAGNOSIS — L30.9 DERMATITIS: Primary | ICD-10-CM

## 2022-05-19 RX ORDER — TRIAMCINOLONE ACETONIDE 1 MG/G
OINTMENT TOPICAL
Qty: 30 G | Refills: 0 | Status: SHIPPED | OUTPATIENT
Start: 2022-05-19 | End: 2022-12-06

## 2022-09-01 DIAGNOSIS — E78.00 ELEVATED CHOLESTEROL: ICD-10-CM

## 2022-09-01 RX ORDER — FENOFIBRATE 160 MG/1
TABLET ORAL
Qty: 90 TABLET | Refills: 0 | Status: SHIPPED | OUTPATIENT
Start: 2022-09-01 | End: 2022-12-06

## 2022-09-01 NOTE — TELEPHONE ENCOUNTER
Medication is being filled for 1 time refill only due to:  Patient needs to be seen because it has been more than one year since last visit.  Prescription approved per [facility] Refill Protocol.  Routed to  for scheduling  Diane Bunn RN, BSN  [facility]

## 2022-09-08 NOTE — TELEPHONE ENCOUNTER
Appointment scheduled for 11/15/22. Scheduled lab only appointment for 11/14/22. Please place fasting lab orders.     Tanner Reid

## 2022-10-04 DIAGNOSIS — F51.01 PRIMARY INSOMNIA: ICD-10-CM

## 2022-10-06 RX ORDER — TRAZODONE HYDROCHLORIDE 50 MG/1
TABLET, FILM COATED ORAL
Qty: 90 TABLET | Refills: 2 | Status: SHIPPED | OUTPATIENT
Start: 2022-10-06 | End: 2023-08-14

## 2022-10-15 ENCOUNTER — HEALTH MAINTENANCE LETTER (OUTPATIENT)
Age: 62
End: 2022-10-15

## 2022-11-10 ENCOUNTER — E-VISIT (OUTPATIENT)
Dept: URGENT CARE | Facility: CLINIC | Age: 62
End: 2022-11-10
Payer: COMMERCIAL

## 2022-11-10 DIAGNOSIS — R05.1 ACUTE COUGH: Primary | ICD-10-CM

## 2022-11-10 PROCEDURE — 99421 OL DIG E/M SVC 5-10 MIN: CPT | Performed by: EMERGENCY MEDICINE

## 2022-11-10 RX ORDER — AZITHROMYCIN 250 MG/1
TABLET, FILM COATED ORAL
Qty: 6 TABLET | Refills: 0 | Status: SHIPPED | OUTPATIENT
Start: 2022-11-10 | End: 2022-11-15

## 2022-12-05 ASSESSMENT — ENCOUNTER SYMPTOMS
DIZZINESS: 0
CHILLS: 0
HEARTBURN: 0
ARTHRALGIAS: 1
HEMATURIA: 0
NERVOUS/ANXIOUS: 1
PARESTHESIAS: 1
COUGH: 1
ABDOMINAL PAIN: 0
DYSURIA: 0
CONSTIPATION: 0
MYALGIAS: 0
SHORTNESS OF BREATH: 1
DIARRHEA: 0
FEVER: 0
EYE PAIN: 1
SORE THROAT: 0
JOINT SWELLING: 0
NAUSEA: 0
HEMATOCHEZIA: 0
PALPITATIONS: 0
WEAKNESS: 0
HEADACHES: 0

## 2022-12-06 ENCOUNTER — OFFICE VISIT (OUTPATIENT)
Dept: FAMILY MEDICINE | Facility: CLINIC | Age: 62
End: 2022-12-06
Payer: COMMERCIAL

## 2022-12-06 VITALS
SYSTOLIC BLOOD PRESSURE: 102 MMHG | WEIGHT: 228.7 LBS | BODY MASS INDEX: 30.98 KG/M2 | HEART RATE: 76 BPM | DIASTOLIC BLOOD PRESSURE: 56 MMHG | OXYGEN SATURATION: 97 % | TEMPERATURE: 98.2 F | RESPIRATION RATE: 16 BRPM | HEIGHT: 72 IN

## 2022-12-06 DIAGNOSIS — R05.9 COUGH, UNSPECIFIED TYPE: ICD-10-CM

## 2022-12-06 DIAGNOSIS — E78.00 ELEVATED CHOLESTEROL: ICD-10-CM

## 2022-12-06 DIAGNOSIS — F43.23 ADJUSTMENT DISORDER WITH MIXED ANXIETY AND DEPRESSED MOOD: ICD-10-CM

## 2022-12-06 DIAGNOSIS — Z00.00 ROUTINE GENERAL MEDICAL EXAMINATION AT A HEALTH CARE FACILITY: Primary | ICD-10-CM

## 2022-12-06 DIAGNOSIS — C79.9 SECONDARY MALIGNANT NEOPLASM OF UNSPECIFIED SITE (CODE) (H): ICD-10-CM

## 2022-12-06 DIAGNOSIS — L98.9 SKIN LESION: ICD-10-CM

## 2022-12-06 DIAGNOSIS — G62.9 NEUROPATHY: ICD-10-CM

## 2022-12-06 DIAGNOSIS — B35.1 FUNGAL INFECTION OF TOENAIL: ICD-10-CM

## 2022-12-06 PROCEDURE — 99396 PREV VISIT EST AGE 40-64: CPT | Performed by: PHYSICIAN ASSISTANT

## 2022-12-06 PROCEDURE — 99213 OFFICE O/P EST LOW 20 MIN: CPT | Mod: 25 | Performed by: PHYSICIAN ASSISTANT

## 2022-12-06 RX ORDER — FENOFIBRATE 160 MG/1
160 TABLET ORAL DAILY
Qty: 90 TABLET | Refills: 1 | Status: SHIPPED | OUTPATIENT
Start: 2022-12-06 | End: 2023-06-07

## 2022-12-06 RX ORDER — GABAPENTIN 100 MG/1
100 CAPSULE ORAL AT BEDTIME
Qty: 90 CAPSULE | Refills: 0 | Status: SHIPPED | OUTPATIENT
Start: 2022-12-06 | End: 2023-03-17

## 2022-12-06 RX ORDER — ALBUTEROL SULFATE 90 UG/1
2 AEROSOL, METERED RESPIRATORY (INHALATION) EVERY 6 HOURS PRN
Qty: 18 G | Refills: 0 | Status: SHIPPED | OUTPATIENT
Start: 2022-12-06

## 2022-12-06 RX ORDER — TERBINAFINE HYDROCHLORIDE 250 MG/1
250 TABLET ORAL DAILY
Qty: 90 TABLET | Refills: 0 | Status: SHIPPED | OUTPATIENT
Start: 2022-12-06 | End: 2023-03-06

## 2022-12-06 ASSESSMENT — ENCOUNTER SYMPTOMS
SHORTNESS OF BREATH: 1
CHILLS: 0
NAUSEA: 0
MYALGIAS: 0
NERVOUS/ANXIOUS: 1
DIARRHEA: 0
COUGH: 1
ABDOMINAL PAIN: 0
WEAKNESS: 0
HEADACHES: 0
ARTHRALGIAS: 1
JOINT SWELLING: 0
HEMATURIA: 0
CONSTIPATION: 0
HEMATOCHEZIA: 0
FEVER: 0
HEARTBURN: 0
DYSURIA: 0
SORE THROAT: 0
DIZZINESS: 0
PALPITATIONS: 0
PARESTHESIAS: 1
EYE PAIN: 1

## 2022-12-06 ASSESSMENT — PAIN SCALES - GENERAL: PAINLEVEL: MILD PAIN (2)

## 2022-12-06 NOTE — PROGRESS NOTES
SUBJECTIVE:   CC: Ronald is an 62 year old who presents for preventative health visit.     Patient has been advised of split billing requirements and indicates understanding: Yes  Healthy Habits:     Getting at least 3 servings of Calcium per day:  Yes    Bi-annual eye exam:  Yes    Dental care twice a year:  Yes    Sleep apnea or symptoms of sleep apnea:  None    Diet:  Regular (no restrictions)    Frequency of exercise:  6-7 days/week    Duration of exercise:  30-45 minutes    Taking medications regularly:  Yes    Medication side effects:  None    PHQ-2 Total Score: 1      Started having bilateral burning in bilateral feet, top of feet, on the medial aspect. Worse when he is laying down, does not notice it when he is walking. Sometimes it gets red and swollen. Does apply some type of lotion on feet, but unsure what it is. Was an avid bike rider but has stopped since it has gotten colder outside.      Fungus on the left great toe. Has tried OTC antifungal creams and has never fully resolved.     Lingering cough for the past month. Had an E-visit about a month ago and was diagnosed with bronchitis, treated with a zpak. Still coughing, feeling SOB; but much improved.     Has been feeling anxious and depressed regarding the political climate. Would like to talk with someone about is feelings. Is open to trying therapy.       Hyperlipidemia Follow-Up      Are you regularly taking any medication or supplement to lower your cholesterol?   Yes- Fenofibrate     Are you having muscle aches or other side effects that you think could be caused by your cholesterol lowering medication?  No      Today's PHQ-2 Score:   PHQ-2 ( 1999 Pf[employer]) 12/5/2022   Q1: Little interest or pleasure in doing things 0   Q2: Feeling down, depressed or hopeless 1   PHQ-2 Score 1   PHQ-2 Total Score (12-17 Years)- Positive if 3 or more points; Administer PHQ-A if positive -   Q1: Little interest or pleasure in doing things Not at all   Q2: Feeling  down, depressed or hopeless Several days   PHQ-2 Score 1       Have you ever done Advance Care Planning? (For example, a Health Directive, POLST, or a discussion with a medical provider or your loved ones about your wishes): No, advance care planning information given to patient to review.  Patient declined advance care planning discussion at this time.    Social History     Tobacco Use     Smoking status: Never     Smokeless tobacco: Never   Substance Use Topics     Alcohol use: Yes     Comment: limited       Alcohol Use 12/5/2022   Prescreen: >3 drinks/day or >7 drinks/week? No   Prescreen: >3 drinks/day or >7 drinks/week? -       Last PSA:   PSA   Date Value Ref Range Status   12/04/2019 0.31 0 - 4 ug/L Final     Comment:     Assay Method:  Chemiluminescence using Siemens Vista analyzer       Reviewed orders with patient. Reviewed health maintenance and updated orders accordingly - Yes  Labs reviewed in EPIC    Reviewed and updated as needed this visit by clinical staff   Tobacco  Allergies  Meds              Reviewed and updated as needed this visit by Provider                   Review of Systems   Constitutional: Negative for chills and fever.   HENT: Positive for congestion and ear pain. Negative for hearing loss and sore throat.    Eyes: Positive for pain (dry eyes). Negative for visual disturbance.   Respiratory: Positive for cough and shortness of breath.    Cardiovascular: Negative for chest pain, palpitations and peripheral edema.   Gastrointestinal: Negative for abdominal pain, constipation, diarrhea, heartburn, hematochezia and nausea.   Genitourinary: Positive for urgency. Negative for dysuria, genital sores, hematuria, impotence and penile discharge.   Musculoskeletal: Positive for arthralgias. Negative for joint swelling and myalgias.   Skin: Negative for rash.   Neurological: Positive for paresthesias. Negative for dizziness, weakness and headaches.   Psychiatric/Behavioral: Positive for mood  changes. The patient is nervous/anxious.          OBJECTIVE:   /56   Pulse 76   Temp 98.2  F (36.8  C) (Oral)   Resp 16   Ht 1.829 m (6')   Wt 103.7 kg (228 lb 11.2 oz)   SpO2 97%   BMI 31.02 kg/m      Physical Exam  GENERAL: healthy, alert and no distress  EYES: Eyes grossly normal to inspection, PERRL and conjunctivae and sclerae normal  HENT: ear canals and TM's normal, nose and mouth without ulcers or lesions  NECK: no adenopathy, no asymmetry, masses, or scars and thyroid normal to palpation  RESP: lungs clear to auscultation - no rales, rhonchi or wheezes  CV: regular rate and rhythm, normal S1 S2, no S3 or S4, no murmur, click or rub, no peripheral edema and peripheral pulses strong  ABDOMEN: soft, nontender, no hepatosplenomegaly, no masses and bowel sounds normal  MS: no gross musculoskeletal defects noted, no edema  SKIN: lesion on scalp, thickened and discolored L great toe  NEURO: Normal strength and tone, mentation intact and speech normal  PSYCH: mentation appears normal, affect normal    Diagnostic Test Results:  Labs reviewed in Epic    ASSESSMENT/PLAN:     1. Routine general medical examination at a health care facility  Completed today.     2. Elevated cholesterol  Will recheck labs in 6 months. Refill fenofibrate today.   - fenofibrate (TRIGLIDE/LOFIBRA) 160 MG tablet; Take 1 tablet (160 mg) by mouth daily  Dispense: 90 tablet; Refill: 1  - Lipid panel reflex to direct LDL Fasting; Future    3. Secondary malignant neoplasm of unspecified site (CODE) (H)  Followed by dermatology annually.     4. Fungal infection of toenail  Start lamisil for fungal infection of toenail.   - terbinafine (LAMISIL) 250 MG tablet; Take 1 tablet (250 mg) by mouth daily for 90 days  Dispense: 90 tablet; Refill: 0    5. Cough, unspecified type  Lingering cough and SOB following bronchitis. PRN albuterol to treat symptoms.   - albuterol (PROAIR HFA/PROVENTIL HFA/VENTOLIN HFA) 108 (90 Base) MCG/ACT inhaler;  Inhale 2 puffs into the lungs every 6 hours as needed for shortness of breath / dyspnea or wheezing  Dispense: 18 g; Refill: 0    6. Adjustment disorder with mixed anxiety and depressed mood  Struggling with anxiety and depression related to social and political climate. Referral to counseling to help with this.   - Adult Mental Health  Referral; Future    7. Skin lesion  New skin lesion on scalp.  - Adult Dermatology Referral; Future    8. Neuropathy  Burning pain in bilateral dorsal side of feet, worse at night. Will trial gabapentin at bedtime to see if this helps with symptoms.   - gabapentin (NEURONTIN) 100 MG capsule; Take 1 capsule (100 mg) by mouth At Bedtime  Dispense: 90 capsule; Refill: 0    Patient has been advised of split billing requirements and indicates understanding: Yes      COUNSELING:   Reviewed preventive health counseling, as reflected in patient instructions       Immunizations    Declined: Covid-19 and Influenza due to current URI.       BMI:   Estimated body mass index is 31.02 kg/m  as calculated from the following:    Height as of this encounter: 1.829 m (6').    Weight as of this encounter: 103.7 kg (228 lb 11.2 oz).       He reports that he has never smoked. He has never used smokeless tobacco.          I was present with the student who participated in the service and in the documentation of the note. I have verified the history and personally performed the physical exam and medical decision-making. I agree with the assessment and plan of care as documented in the note.        Perez Cardenas RN, DNP student        Deacon Sotelo PA-C  [facility]

## 2022-12-29 DIAGNOSIS — R05.9 COUGH, UNSPECIFIED TYPE: ICD-10-CM

## 2022-12-29 RX ORDER — ALBUTEROL SULFATE 90 UG/1
AEROSOL, METERED RESPIRATORY (INHALATION)
OUTPATIENT
Start: 2022-12-29

## 2022-12-29 NOTE — TELEPHONE ENCOUNTER
Routing refill request to provider for review/approval because:  Would you like pt to continue this med?  Vanessa Hartley RN, BSN  [facility]

## 2022-12-29 NOTE — TELEPHONE ENCOUNTER
Given for lingering bronchitis earlier this month--if still has symptoms needs appt.     Maria Victoria Willis CNP

## 2023-01-02 NOTE — TELEPHONE ENCOUNTER
Call to pt- adv of below. He reports doing better. He did not request refill and still has some left.     Blanca HOUSE RN

## 2023-01-10 ENCOUNTER — OFFICE VISIT (OUTPATIENT)
Dept: DERMATOLOGY | Facility: CLINIC | Age: 63
End: 2023-01-10
Attending: PHYSICIAN ASSISTANT
Payer: COMMERCIAL

## 2023-01-10 DIAGNOSIS — L57.0 AK (ACTINIC KERATOSIS): ICD-10-CM

## 2023-01-10 DIAGNOSIS — L82.1 SEBORRHEIC KERATOSIS: ICD-10-CM

## 2023-01-10 DIAGNOSIS — Z23 HIGH PRIORITY FOR 2019-NCOV VACCINE: Primary | ICD-10-CM

## 2023-01-10 DIAGNOSIS — L98.9 SKIN LESION: ICD-10-CM

## 2023-01-10 PROCEDURE — 91313 COVID-19 VACCINE BIVALENT BOOSTER 18+ (MODERNA): CPT | Performed by: DERMATOLOGY

## 2023-01-10 PROCEDURE — 99213 OFFICE O/P EST LOW 20 MIN: CPT | Performed by: DERMATOLOGY

## 2023-01-10 PROCEDURE — 0134A COVID-19 VACCINE BIVALENT BOOSTER 18+ (MODERNA): CPT | Performed by: DERMATOLOGY

## 2023-01-10 NOTE — LETTER
1/10/2023      RE: Ronald Ingram  [address] [facility] [address]     Dear Colleague,    Thank you for the opportunity to participate in the care of your patient, Ronald Ingram, at the [facility]. Please see a copy of my visit note below.    Dermatology Problem List:  1. Melanoma, L upper arm B[place]low at least 2.6 mm, >1 mitosis, + ulceration. [place] node negative. Diagnosis 6/6/17.   2. Benign pigmented nevi   3. Monitoring macule on the R dorsal toe  4. Aks - calcipotriene +5FU in 4/22  5. Seborrheic keratoses  6. Tanning bed use history        CHIEF COMPLAINT:  Skin check.      HISTORY OF PRESENT ILLNESS:  Mr. Ingram is a 62-year-old male returning to Dermatology for evaluation of new lesion on the scalp. He has history of MM. No pain or bleeding of the scalp lesion. Completed efudex course with good response.      Patient Active Problem List   Diagnosis     Mixed hyperlipidemia     Labral tear of shoulder- left; work related     CARDIOVASCULAR SCREENING; LDL GOAL LESS THAN 160     Pain in joint involving ankle and foot     Ankle joint stiffness     Right shoulder pain     Grief     AK (actinic keratosis)     Solar lentigo     Seborrheic keratosis     Melanoma of left upper arm (H)     SK (seborrheic keratosis)     Metastatic malignant melanoma (H)     Elevated cholesterol     Tongue discoloration     Primary insomnia     Secondary malignant neoplasm of unspecified site (CODE) (H)       Allergies   Allergen Reactions     Dust Mites      Nasal cavity tingles          Current Outpatient Medications   Medication     albuterol (PROAIR HFA/PROVENTIL HFA/VENTOLIN HFA) 108 (90 Base) MCG/ACT inhaler     cyanocobalamin (VITAMIN B-12) 1000 MCG tablet     fenofibrate (TRIGLIDE/LOFIBRA) 160 MG tablet     fish oil-omega-3 fatty acids (OMEGA 3) 1000 MG capsule     gabapentin (NEURONTIN) 100 MG capsule     Melatonin 10 MG TABS tablet      Multiple Vitamins-Minerals (ONE-A-DAY ENERGY) TABS     sildenafil (VIAGRA) 100 MG tablet     terbinafine (LAMISIL) 250 MG tablet     traZODone (DESYREL) 50 MG tablet     vitamin D3 (CHOLECALCIFEROL) 50 mcg (2000 units) tablet     Current Facility-Administered Medications   Medication     lidocaine 1% with EPINEPHrine 1:100,000 injection 3 mL            SOCIAL HISTORY:  The patient helps watch his grandson age 1 year. Has new granddaughter born xmas.  He has 2 sons.      PHYSICAL EXAMINATION:   GENERAL:  The patient is a healthy-appearing male in no distress.   HEENT:  Conjunctivae clear.   PULMONARY:  Breathing comfortably on room air.   ABDOMEN:  No abdominal distention.   SKIN:  Exam included the scalp, face  -Tan waxy papule on the L vertex scalp 6 mm  -Face is clear     ASSESSMENT AND PLAN:   1.  Seborrheic keratoses; benign hyperkeratotic papules.  No treatment advised.   2. History of AKs; clear with efudex  3. Covid bivalent vaccine provided.      RTC in April for full skin check.     Rosa Jaquez MD   of Dermatology  [facility]

## 2023-01-11 NOTE — PROGRESS NOTES
Dermatology Problem List:  1. Melanoma, L upper arm B[place] at least 2.6 mm, >1 mitosis, + ulceration. [place] node negative. Diagnosis 6/6/17.   2. Benign pigmented nevi   3. Monitoring macule on the R dorsal toe  4. Aks - calcipotriene +5FU in 4/22  5. Seborrheic keratoses  6. Tanning bed use history        CHIEF COMPLAINT:  Skin check.      HISTORY OF PRESENT ILLNESS:  Mr. Ingram is a 62-year-old male returning to Dermatology for evaluation of new lesion on the scalp. He has history of MM. No pain or bleeding of the scalp lesion. Completed efudex course with good response.      Patient Active Problem List   Diagnosis     Mixed hyperlipidemia     Labral tear of shoulder- left; work related     CARDIOVASCULAR SCREENING; LDL GOAL LESS THAN 160     Pain in joint involving ankle and foot     Ankle joint stiffness     Right shoulder pain     Grief     AK (actinic keratosis)     Solar lentigo     Seborrheic keratosis     Melanoma of left upper arm (H)     SK (seborrheic keratosis)     Metastatic malignant melanoma (H)     Elevated cholesterol     Tongue discoloration     Primary insomnia     Secondary malignant neoplasm of unspecified site (CODE) (H)       Allergies   Allergen Reactions     Dust Mites      Nasal cavity tingles          Current Outpatient Medications   Medication     albuterol (PROAIR HFA/PROVENTIL HFA/VENTOLIN HFA) 108 (90 Base) MCG/ACT inhaler     cyanocobalamin (VITAMIN B-12) 1000 MCG tablet     fenofibrate (TRIGLIDE/LOFIBRA) 160 MG tablet     fish oil-omega-3 fatty acids (OMEGA 3) 1000 MG capsule     gabapentin (NEURONTIN) 100 MG capsule     Melatonin 10 MG TABS tablet     Multiple Vitamins-Minerals (ONE-A-DAY ENERGY) TABS     sildenafil (VIAGRA) 100 MG tablet     terbinafine (LAMISIL) 250 MG tablet     traZODone (DESYREL) 50 MG tablet     vitamin D3 (CHOLECALCIFEROL) 50 mcg (2000 units) tablet     Current Facility-Administered Medications   Medication     lidocaine 1% with EPINEPHrine 1:100,000  injection 3 mL            SOCIAL HISTORY:  The patient helps watch his grandson age 1 year. Has new granddaughter born xmas.  He has 2 sons.      PHYSICAL EXAMINATION:   GENERAL:  The patient is a healthy-appearing male in no distress.   HEENT:  Conjunctivae clear.   PULMONARY:  Breathing comfortably on room air.   ABDOMEN:  No abdominal distention.   SKIN:  Exam included the scalp, face  -Tan waxy papule on the L vertex scalp 6 mm  -Face is clear     ASSESSMENT AND PLAN:   1.  Seborrheic keratoses; benign hyperkeratotic papules.  No treatment advised.   2. History of AKs; clear with efudex  3. Covid bivalent vaccine provided.      RTC in April for full skin check.     Rosa Jaquez MD   of Dermatology  [facility]

## 2023-02-07 ENCOUNTER — LAB (OUTPATIENT)
Dept: LAB | Facility: CLINIC | Age: 63
End: 2023-02-07
Payer: COMMERCIAL

## 2023-02-07 DIAGNOSIS — E78.00 ELEVATED CHOLESTEROL: ICD-10-CM

## 2023-02-07 LAB
CHOLEST SERPL-MCNC: 224 MG/DL
HDLC SERPL-MCNC: 32 MG/DL
LDLC SERPL CALC-MCNC: 138 MG/DL
NONHDLC SERPL-MCNC: 192 MG/DL
TRIGL SERPL-MCNC: 268 MG/DL

## 2023-02-07 PROCEDURE — 80061 LIPID PANEL: CPT

## 2023-02-07 PROCEDURE — 36415 COLL VENOUS BLD VENIPUNCTURE: CPT

## 2023-02-18 DIAGNOSIS — E78.00 ELEVATED CHOLESTEROL: Primary | ICD-10-CM

## 2023-02-18 RX ORDER — ATORVASTATIN CALCIUM 10 MG/1
10 TABLET, FILM COATED ORAL DAILY
Qty: 90 TABLET | Refills: 0 | Status: SHIPPED | OUTPATIENT
Start: 2023-02-18 | End: 2023-05-18

## 2023-03-04 NOTE — PROGRESS NOTES
DISCHARGE REPORT    Progress reporting period is from eval to 1/26/21.       SUBJECTIVE  Subjective changes noted by patient:  .  Subjective: Functioning at 90% of where he wants to be.  No sx's in past 1-2 weeks    Current pain level is  Current Pain level: 0/10.     Previous pain level was   Initial Pain level: 4/10.   Changes in function:  Yes (See Goal flowsheet attached for changes in current functional level)  Adverse reaction to treatment or activity: None    OBJECTIVE  Changes noted in objective findings:  Yes,   Objective: AROM WNL Strength 4+/5 generally on left.     ASSESSMENT/PLAN  Updated problem list and treatment plan: Diagnosis 1:  L shoulder pain  Decreased strength - therapeutic exercise and therapeutic activities  Impaired muscle performance - neuro re-education  STG/LTGs have been met or progress has been made towards goals:  Yes (See Goal flow sheet completed today.)  Assessment of Progress: The patient's condition is improving.  The patient's condition has potential to improve.  The patient has met all of their long term goals.  Self Management Plans:  Patient has been instructed in a home treatment program.  Patient is independent in a home treatment program.  I have re-evaluated this patient and find that the nature, scope, duration and intensity of the therapy is appropriate for the medical condition of the patient.      Recommendations:  This patient is ready to be discharged from therapy and continue their home treatment program.    Please refer to the daily flowsheet for treatment today, total treatment time and time spent performing 1:1 timed codes.         Andrew Razo)

## 2023-03-16 DIAGNOSIS — G62.9 NEUROPATHY: ICD-10-CM

## 2023-03-16 NOTE — TELEPHONE ENCOUNTER
Rosendo has not seen pt since 7/27/21.  No upcoming appt. Has been scheduled.   Looks like he last saw Deacon in December and she addressed med.   Will defer to her or pt can schedule appt with Rosendo

## 2023-03-17 RX ORDER — GABAPENTIN 100 MG/1
CAPSULE ORAL
Qty: 90 CAPSULE | Refills: 1 | Status: SHIPPED | OUTPATIENT
Start: 2023-03-17 | End: 2023-06-30

## 2023-05-18 DIAGNOSIS — E78.00 ELEVATED CHOLESTEROL: ICD-10-CM

## 2023-05-18 RX ORDER — ATORVASTATIN CALCIUM 10 MG/1
10 TABLET, FILM COATED ORAL DAILY
Qty: 90 TABLET | Refills: 1 | Status: SHIPPED | OUTPATIENT
Start: 2023-05-18 | End: 2024-01-04

## 2023-05-26 NOTE — PATIENT INSTRUCTIONS
"    Dear Ronald Ingram    After reviewing your responses, I've been able to diagnose you with \"Bronchitis\" which is a common infection of your lungs. While this is most commonly caused by a virus, the symptoms you have given suggest you should be treated with antibiotics.     I have sent zpak to your pharmacy to treat this infection.     It is important that you take all of your prescribed medication even if your symptoms are improving after a few doses. Taking all of your medicine helps prevent the symptoms from returning.     If your symptoms worsen, you develop chest pain or shortness of breath, fevers over 101, or are not improving in 5 days, please contact your primary care provider for an appointment or visit any of our convenient Walk-in Care or Urgent Care Centers to be seen which can be found on our website here.    Thanks again for choosing us as your health care partner,    Jaxson Prater MD    " "Subjective   History of Present Illness: Nain Rangel is a 66 y.o. male is being seen for consultation today at the request of Gary Camejo MD for lumbar radiculopathy. Today in the office patient reports back and bilateral lower extremity pain.  Patient says that over the course of last 2 years he developed significant low back pain as well as pain radiating into his lower extremities.  The pain is into his posterior thighs and calves.  The pain is most severe when he is standing and walking and tends to improve when he sits down or lies down.  If he is using a shopping cart it significantly improves his symptoms.  Patient is undergone physical therapy without significant improvement.  He has not tried epidural steroid injections.    Chief Complaint   Patient presents with   • Back Pain   • Leg Pain   • Tingling          Previous Treatment: MDP, Gabapentin, NSAID's, Physical therapy, HEP (His son is a physical therapist) KORT    The following portions of the patient's history were reviewed and updated as appropriate: allergies, current medications, past family history, past medical history, past social history, past surgical history and problem list.    Review of Systems   Constitutional: Positive for activity change.   HENT: Negative.    Eyes: Negative.    Respiratory: Negative for chest tightness and shortness of breath.    Cardiovascular: Negative for chest pain.   Gastrointestinal: Negative.    Endocrine: Negative.    Genitourinary: Negative.    Musculoskeletal: Positive for back pain, gait problem and myalgias.        Bilateral leg pain   Skin: Negative.    Allergic/Immunologic: Negative.    Neurological:        Positive for tingling   Hematological: Negative.    Psychiatric/Behavioral: Negative.        Objective     Pulse 64   Resp 18   Ht 165.1 cm (65\")   Wt 88 kg (194 lb)   SpO2 97%   BMI 32.28 kg/m²    Body mass index is 32.28 kg/m².  Vitals:    05/26/23 0946   PainSc:   9   PainLoc: " Back  Comment: BLE pain           Neurologic Exam     Mental Status   Oriented to person, place, and time.     Motor Exam     Strength   Strength 5/5 throughout.     Sensory Exam   Light touch normal.     Gait, Coordination, and Reflexes     Reflexes   Right Wolff: absent  Left Wolff: absent      Assessment & Plan   Independent Review of Radiographic Studies:      I personally reviewed and interpreted the images from the following studies.    MRI lumbar spine: Multilevel degenerative changes most severe from L3-L5.  There is severe central stenosis at L3-4.  There is moderate to severe central stenosis at L4-5    Medical Decision Making:      Nain Rangel is a 66 y.o. male with history of progressive symptoms of neurogenic claudication with severe central stenosis from L3-5 on MRI.  Patient has tried physical therapy without improvement.  I discussed surgery versus epidural steroid injection.  He will consider surgery and we will send him for an epidural steroid injection in the meantime.  If we are to proceed with surgery we would perform an L3-5 lumbar decompression.  He will need cardiac clearance if he decides to proceed      Diagnoses and all orders for this visit:    1. Lumbar stenosis with neurogenic claudication (Primary)    2. Lumbar degenerative disc disease      No follow-ups on file.    This patient was examined wearing appropriate personal protective equipment.                      Dr. Yony Leblanc IV    05/26/23  10:23 EDT

## 2023-06-04 DIAGNOSIS — E78.00 ELEVATED CHOLESTEROL: ICD-10-CM

## 2023-06-07 RX ORDER — FENOFIBRATE 160 MG/1
TABLET ORAL
Qty: 90 TABLET | Refills: 1 | Status: SHIPPED | OUTPATIENT
Start: 2023-06-07 | End: 2024-01-04

## 2023-06-07 NOTE — TELEPHONE ENCOUNTER
Prescription approved per [facility] Refill Protocol.  Grace MENDOZA RN, BSN

## 2023-08-14 DIAGNOSIS — R05.9 COUGH, UNSPECIFIED TYPE: ICD-10-CM

## 2023-08-14 DIAGNOSIS — F51.01 PRIMARY INSOMNIA: ICD-10-CM

## 2023-08-16 RX ORDER — TRAZODONE HYDROCHLORIDE 50 MG/1
TABLET, FILM COATED ORAL
Qty: 90 TABLET | Refills: 0 | Status: SHIPPED | OUTPATIENT
Start: 2023-08-16 | End: 2024-01-04

## 2023-08-16 NOTE — TELEPHONE ENCOUNTER
Routing refill request to provider for review/approval because:  Drug not on the [facility] refill protocol prescribed for a cough

## 2023-08-17 RX ORDER — ALBUTEROL SULFATE 90 UG/1
2 AEROSOL, METERED RESPIRATORY (INHALATION) EVERY 6 HOURS PRN
Qty: 18 G | Refills: 0 | OUTPATIENT
Start: 2023-08-17

## 2023-08-17 NOTE — TELEPHONE ENCOUNTER
Sent [employer] message requesting a call back for an appt. Two more attempts will be made.     Iwona Downs

## 2023-08-24 ENCOUNTER — TELEPHONE (OUTPATIENT)
Dept: FAMILY MEDICINE | Facility: CLINIC | Age: 63
End: 2023-08-24
Payer: COMMERCIAL

## 2023-08-24 NOTE — TELEPHONE ENCOUNTER
Pt recently switched pharamcies and didn't need refills but did schedule a med check on 10/3 with [facility]. Separate encounter sent requesting lab orders for the week prior to that appt.    Iwona Downs

## 2023-08-24 NOTE — TELEPHONE ENCOUNTER
Pt requesting fasting labs including cholesterol and triglycerides be ordered for the lab appt that is scheduled on 9/29. Pt plans to fast.    Iwona Downs

## 2023-09-28 ENCOUNTER — NURSE TRIAGE (OUTPATIENT)
Dept: NURSING | Facility: CLINIC | Age: 63
End: 2023-09-28
Payer: COMMERCIAL

## 2023-09-28 NOTE — TELEPHONE ENCOUNTER
Patient calling. He has an appointment coming up on Tuesday. He made an appointment for tomorrow for lab draw.     He got a notice today that he has a fasting lab draw scheduled for 1415. He is concerned that he would be fasting for a very long time. Patient is not diabetic.     Patient was encouraged to get up early and eat prior to 0600 tomorrow, and then fast until after his lab draw, as that would be 8 hours. Patient was able to state understanding. No triage.    Frida Sadler RN  [facility]  September 28, 2023, 2:32 PM    Reason for Disposition   Health information question, no triage required and triager able to answer question    Additional Information   Negative: New-onset or worsening symptoms, see that protocol (e.g., diarrhea, runny nose, sore throat)   Negative: Medicine question not related to refill or renewal   Negative: Requesting a renewal or refill of a medicine patient is currently taking   Negative: Questions or concerns about high blood pressure   Negative: Nursing judgment   Negative: Nursing judgment   Negative: Nursing judgment   Negative: Requesting lab results and adult stable (no new symptoms, not worsening)   Negative: Requesting referral to a specialist   Negative: Questions about durable medical equipment ordered and triager unable to answer   Negative: Requesting regular office appointment and adult stable (no new symptoms, not worsening)    Protocols used: Information Only Call - No Triage-A-[place]

## 2023-10-03 ENCOUNTER — OFFICE VISIT (OUTPATIENT)
Dept: FAMILY MEDICINE | Facility: CLINIC | Age: 63
End: 2023-10-03
Payer: COMMERCIAL

## 2023-10-03 VITALS
WEIGHT: 225 LBS | BODY MASS INDEX: 29.82 KG/M2 | TEMPERATURE: 97.9 F | SYSTOLIC BLOOD PRESSURE: 110 MMHG | DIASTOLIC BLOOD PRESSURE: 60 MMHG | RESPIRATION RATE: 16 BRPM | HEIGHT: 73 IN | HEART RATE: 70 BPM | OXYGEN SATURATION: 96 %

## 2023-10-03 DIAGNOSIS — G62.9 NEUROPATHY: Primary | ICD-10-CM

## 2023-10-03 PROCEDURE — 36415 COLL VENOUS BLD VENIPUNCTURE: CPT | Performed by: PHYSICIAN ASSISTANT

## 2023-10-03 PROCEDURE — 82607 VITAMIN B-12: CPT | Performed by: PHYSICIAN ASSISTANT

## 2023-10-03 PROCEDURE — 99213 OFFICE O/P EST LOW 20 MIN: CPT | Performed by: PHYSICIAN ASSISTANT

## 2023-10-03 PROCEDURE — 80053 COMPREHEN METABOLIC PANEL: CPT | Performed by: PHYSICIAN ASSISTANT

## 2023-10-03 PROCEDURE — 84443 ASSAY THYROID STIM HORMONE: CPT | Performed by: PHYSICIAN ASSISTANT

## 2023-10-03 RX ORDER — GABAPENTIN 300 MG/1
300 CAPSULE ORAL AT BEDTIME
Qty: 90 CAPSULE | Refills: 0 | Status: SHIPPED | OUTPATIENT
Start: 2023-10-03

## 2023-10-03 ASSESSMENT — PAIN SCALES - GENERAL: PAINLEVEL: NO PAIN (0)

## 2023-10-03 NOTE — PROGRESS NOTES
"  Assessment & Plan     Neuropathy  Unclear cause but seems to be worsening.  Keeping him up at night.  Increase gabapentin to 300mg at night.  Check labs.  Would recommend neurology referral, patient preferred to wait on this.  - TSH with free T4 reflex; Future  - Vitamin B12; Future  - Comprehensive metabolic panel (BMP + Alb, Alk Phos, ALT, AST, Total. Bili, TP); Future  - gabapentin (NEURONTIN) 300 MG capsule; Take 1 capsule (300 mg) by mouth At Bedtime  - TSH with free T4 reflex  - Vitamin B12  - Comprehensive metabolic panel (BMP + Alb, Alk Phos, ALT, AST, Total. Bili, TP)         BMI:   Estimated body mass index is 29.89 kg/m  as calculated from the following:    Height as of this encounter: 1.848 m (6' 0.75\").    Weight as of this encounter: 102.1 kg (225 lb).   Weight management plan: Discussed healthy diet and exercise guidelines        Deacon Sotelo PA-C  [facility] ROLAND Cardenas is a 62 year old, presenting for the following health issues:  Foot Pain        10/3/2023     1:20 PM   Additional Questions   Roomed by Lisa Magill, CMA   Accompanied by self         10/3/2023     1:20 PM   Patient Reported Additional Medications   Patient reports taking the following new medications NONE       History of Present Illness       Reason for visit:  Physical    He eats 2-3 servings of fruits and vegetables daily.He consumes 1 sweetened beverage(s) daily.He exercises with enough effort to increase his heart rate 60 or more minutes per day.  He exercises with enough effort to increase his heart rate 3 or less days per week. He is missing 1 dose(s) of medications per week.  He is not taking prescribed medications regularly due to remembering to take.         Pain History:  When did you first notice your pain? December 2022   Have you seen anyone else for your pain? Yes   How has your pain affected your ability to work? Not currently working - unrelated to pain  Where in your body " "do you have pain? Musculoskeletal problem/pain  Onset/Duration: December 2022  Description  Location: foot - bilateral  Joint Swelling: No  Redness: YES  Pain: YES  Warmth: No  Intensity:  severe, 7/10  Progression of Symptoms:  worsening and intermittent  Accompanying signs and symptoms:   Fevers: No  Numbness/tingling/weakness: YES  History  Trauma to the area: No  Recent illness:  No  Previous similar problem: YES  Previous evaluation:  YES  Precipitating or alleviating factors:  Aggravating factors include: laying down.  Gabapentin.   Therapies tried and outcome: rest/inactivity and gabapentin.        Several nights per week pain is bad enough to keep him awake  Was just the left and now right foot is bothering him too.  Gabapentin was helping but thinks the dose may need to be increased.        Review of Systems   Constitutional, HEENT, cardiovascular, pulmonary, gi and gu systems are negative, except as otherwise noted.      Objective    /60 (BP Location: Right arm, Patient Position: Sitting, Cuff Size: Adult Regular)   Pulse 70   Temp 97.9  F (36.6  C) (Oral)   Resp 16   Ht 1.848 m (6' 0.75\")   Wt 102.1 kg (225 lb)   SpO2 96%   BMI 29.89 kg/m    Body mass index is 29.89 kg/m .  Physical Exam   GENERAL: healthy, alert and no distress  CV: regular rate and rhythm, normal S1 S2, no S3 or S4, no murmur, click or rub, no peripheral edema and peripheral pulses strong  MS: no gross musculoskeletal defects noted, no edema  SKIN: no suspicious lesions or rashes  Diabetic foot exam: normal DP and PT pulses, no trophic changes or ulcerative lesions, and normal monofilament exam, except for findings noted on foot graphical image- patient without sensation at any locations tested (1-6)                          "

## 2023-10-04 LAB
ALBUMIN SERPL BCG-MCNC: 4.4 G/DL (ref 3.5–5.2)
ALP SERPL-CCNC: 39 U/L (ref 40–129)
ALT SERPL W P-5'-P-CCNC: 25 U/L (ref 0–70)
ANION GAP SERPL CALCULATED.3IONS-SCNC: 11 MMOL/L (ref 7–15)
AST SERPL W P-5'-P-CCNC: 26 U/L (ref 0–45)
BILIRUB SERPL-MCNC: 0.3 MG/DL
BUN SERPL-MCNC: 19.7 MG/DL (ref 8–23)
CALCIUM SERPL-MCNC: 9.3 MG/DL (ref 8.8–10.2)
CHLORIDE SERPL-SCNC: 105 MMOL/L (ref 98–107)
CREAT SERPL-MCNC: 1.3 MG/DL (ref 0.67–1.17)
DEPRECATED HCO3 PLAS-SCNC: 23 MMOL/L (ref 22–29)
EGFRCR SERPLBLD CKD-EPI 2021: 62 ML/MIN/1.73M2
GLUCOSE SERPL-MCNC: 93 MG/DL (ref 70–99)
POTASSIUM SERPL-SCNC: 4.2 MMOL/L (ref 3.4–5.3)
PROT SERPL-MCNC: 7.1 G/DL (ref 6.4–8.3)
SODIUM SERPL-SCNC: 139 MMOL/L (ref 135–145)
TSH SERPL DL<=0.005 MIU/L-ACNC: 1.09 UIU/ML (ref 0.3–4.2)
VIT B12 SERPL-MCNC: 978 PG/ML (ref 232–1245)

## 2023-12-12 ENCOUNTER — LAB (OUTPATIENT)
Dept: LAB | Facility: CLINIC | Age: 63
End: 2023-12-12
Payer: COMMERCIAL

## 2023-12-12 DIAGNOSIS — E78.00 ELEVATED CHOLESTEROL: ICD-10-CM

## 2023-12-12 LAB
CHOLEST SERPL-MCNC: 174 MG/DL
FASTING STATUS PATIENT QL REPORTED: YES
HDLC SERPL-MCNC: 36 MG/DL
LDLC SERPL CALC-MCNC: 107 MG/DL
NONHDLC SERPL-MCNC: 138 MG/DL
TRIGL SERPL-MCNC: 157 MG/DL

## 2023-12-12 PROCEDURE — 80061 LIPID PANEL: CPT

## 2023-12-12 PROCEDURE — 36415 COLL VENOUS BLD VENIPUNCTURE: CPT

## 2024-01-04 ENCOUNTER — OFFICE VISIT (OUTPATIENT)
Dept: FAMILY MEDICINE | Facility: CLINIC | Age: 64
End: 2024-01-04
Attending: PHYSICIAN ASSISTANT
Payer: COMMERCIAL

## 2024-01-04 VITALS
WEIGHT: 222.2 LBS | HEIGHT: 73 IN | DIASTOLIC BLOOD PRESSURE: 64 MMHG | HEART RATE: 79 BPM | SYSTOLIC BLOOD PRESSURE: 100 MMHG | TEMPERATURE: 97.9 F | BODY MASS INDEX: 29.45 KG/M2 | OXYGEN SATURATION: 96 % | RESPIRATION RATE: 16 BRPM

## 2024-01-04 DIAGNOSIS — E78.00 ELEVATED CHOLESTEROL: ICD-10-CM

## 2024-01-04 DIAGNOSIS — F51.01 PRIMARY INSOMNIA: ICD-10-CM

## 2024-01-04 DIAGNOSIS — R05.1 ACUTE COUGH: Primary | ICD-10-CM

## 2024-01-04 DIAGNOSIS — G62.9 NEUROPATHY: ICD-10-CM

## 2024-01-04 LAB
FLUAV RNA SPEC QL NAA+PROBE: POSITIVE
FLUBV RNA RESP QL NAA+PROBE: NEGATIVE
RSV RNA SPEC NAA+PROBE: NEGATIVE
SARS-COV-2 RNA RESP QL NAA+PROBE: NEGATIVE

## 2024-01-04 PROCEDURE — 99214 OFFICE O/P EST MOD 30 MIN: CPT | Performed by: PHYSICIAN ASSISTANT

## 2024-01-04 PROCEDURE — 87637 SARSCOV2&INF A&B&RSV AMP PRB: CPT | Performed by: PHYSICIAN ASSISTANT

## 2024-01-04 RX ORDER — ATORVASTATIN CALCIUM 10 MG/1
10 TABLET, FILM COATED ORAL DAILY
Qty: 90 TABLET | Refills: 1 | Status: SHIPPED | OUTPATIENT
Start: 2024-01-04

## 2024-01-04 RX ORDER — DOXYCYCLINE HYCLATE 100 MG
100 TABLET ORAL 2 TIMES DAILY
Qty: 20 TABLET | Refills: 0 | Status: SHIPPED | OUTPATIENT
Start: 2024-01-04 | End: 2024-01-14

## 2024-01-04 RX ORDER — ALBUTEROL SULFATE 90 UG/1
2 AEROSOL, METERED RESPIRATORY (INHALATION) EVERY 6 HOURS PRN
Qty: 18 G | Refills: 0 | Status: SHIPPED | OUTPATIENT
Start: 2024-01-04

## 2024-01-04 RX ORDER — TRAZODONE HYDROCHLORIDE 50 MG/1
TABLET, FILM COATED ORAL
Qty: 90 TABLET | Refills: 0 | Status: SHIPPED | OUTPATIENT
Start: 2024-01-04 | End: 2024-05-28

## 2024-01-04 RX ORDER — FENOFIBRATE 160 MG/1
160 TABLET ORAL DAILY
Qty: 90 TABLET | Refills: 1 | Status: SHIPPED | OUTPATIENT
Start: 2024-01-04

## 2024-01-04 RX ORDER — BENZONATATE 200 MG/1
200 CAPSULE ORAL 3 TIMES DAILY PRN
Qty: 30 CAPSULE | Refills: 0 | Status: SHIPPED | OUTPATIENT
Start: 2024-01-04

## 2024-01-04 ASSESSMENT — ENCOUNTER SYMPTOMS
HEADACHES: 0
SHORTNESS OF BREATH: 1
JOINT SWELLING: 0
DIZZINESS: 1
MYALGIAS: 0
FEVER: 0
NAUSEA: 0
CONSTIPATION: 0
FREQUENCY: 1
SORE THROAT: 0
ARTHRALGIAS: 0
HEMATURIA: 0
NERVOUS/ANXIOUS: 1
ABDOMINAL PAIN: 1
EYE PAIN: 0
HEARTBURN: 0
PARESTHESIAS: 1
DYSURIA: 0
COUGH: 1
HEMATOCHEZIA: 0
PALPITATIONS: 0
CHILLS: 1
WEAKNESS: 1
DIARRHEA: 0

## 2024-01-04 ASSESSMENT — PAIN SCALES - GENERAL: PAINLEVEL: NO PAIN (0)

## 2024-01-04 NOTE — PROGRESS NOTES
Assessment & Plan     Acute cough    9 day history of productive cough, feeling feverish, fatigue, and chills.  Wife has had similar symptoms for the same duration.  He has also noted nasal congestion worsening over the past few days, with a persistent congestion headache now on the left side of his scalp.  Cough is productive, and he states that he can hear rattling in his chest when lying on his back at night.      Spent time with friends just prior to the onset of symptoms.  One of those friends has since tested positive for Influenza A, for which he would like to be evaluated for today.      Flu/Covid/RSV swab in clinic today.  Empiric treatment with doxycycline prescribed, as well as an albuterol inhaler that he has used in the past, and Tessalon for cough suppression.      - Symptomatic Influenza A/B, RSV, & SARS-CoV2 PCR (COVID-19); Future  - albuterol (PROAIR HFA/PROVENTIL HFA/VENTOLIN HFA) 108 (90 Base) MCG/ACT inhaler; Inhale 2 puffs into the lungs every 6 hours as needed for shortness of breath, wheezing or cough  - benzonatate (TESSALON) 200 MG capsule; Take 1 capsule (200 mg) by mouth 3 times daily as needed for cough  - doxycycline hyclate (VIBRA-TABS) 100 MG tablet; Take 1 tablet (100 mg) by mouth 2 times daily for 10 days  - Symptomatic Influenza A/B, RSV, & SARS-CoV2 PCR (COVID-19) Nasopharyngeal    Elevated cholesterol    Recent lab values discussed.  No need for adjustments at this time.  Fenofibrate also refilled today.  Visit today was scheduled as a physical but changed to office visit due to illness.  Can complete a physical in 6-12 months, sooner prn.  - atorvastatin (LIPITOR) 10 MG tablet; Take 1 tablet (10 mg) by mouth daily    Primary insomnia    Uses the trazodone infrequently, but still finds helpful if he needs help falling asleep.    - traZODone (DESYREL) 50 MG tablet; TAKE 1 TABLET BY MOUTH AT BDTIME    Neuropathy  Ongoing- gabapentin not very helpful for burning in feet at night.   Recently bought a topical cream that does seem to help some.  - Adult Neurology  Referral; Future    Review of external notes as documented elsewhere in note       MEDICATIONS:  Continue current medications without change      JORGE Colin [facility] ROLAND Cardenas is a 63 year old, presenting for the following health issues:  Physical      1/4/2024     1:03 PM   Additional Questions   Roomed by Bharti COLLAZO MA   Accompanied by self         1/4/2024     1:03 PM   Patient Reported Additional Medications   Patient reports taking the following new medications nonne       HPI       Acute Illness  Acute illness concerns: 9 day history of productive cough, feeling feverish, fatigue, and chills.  Onset/Duration: 9 days ago  Symptoms:  Fever: YES- has not taken, but feels as though he has had one  Chills/Sweats: YES  Headache (location?): No  Sinus Pressure: YES  Conjunctivitis:  No  Ear Pain: no  Rhinorrhea: YES  Congestion: YES  Sore Throat: No  Cough: YES-productive of yellow sputum  Wheeze: YES- can audibly hear himself rattle/wheeze when lying on his back  Decreased Appetite: No  Nausea: No  Vomiting: No  Diarrhea: No  Dysuria/Freq.: No  Dysuria or Hematuria: No  Fatigue/Achiness: YES  Sick/Strep Exposure: YES - Friend who he had contact with is also ill with similar symptoms and tested positive for FluA  Therapies tried and outcome: cool air seems to relieve his cough        Review of Systems   CONSTITUTIONAL:NEGATIVE for fever, chills, change in weight, fever has not taken, but feels feverish, and myalgias  ENT/MOUTH: NEGATIVE for ear, mouth and throat problems  RESP:NEGATIVE for significant cough or SOB, cough-productive, and exposure to Influenza A  CV: NEGATIVE for chest pain, palpitations or peripheral edema  MUSCULOSKELETAL: NEGATIVE for significant arthralgias or myalgia      Objective    /64 (BP Location: Right arm, Patient Position: Sitting, Cuff Size:  "Adult Regular)   Pulse 79   Temp 97.9  F (36.6  C) (Oral)   Resp 16   Ht 1.854 m (6' 1\")   Wt 100.8 kg (222 lb 3.2 oz)   SpO2 96%   BMI 29.32 kg/m    Body mass index is 29.32 kg/m .  Physical Exam   GENERAL: healthy, alert and no distress  EYES: Eyes grossly normal to inspection, PERRL and conjunctivae and sclerae normal  HENT: normal cephalic/atraumatic, ear canals and TM's normal, nasal mucosa edematous , rhinorrhea yellow, oropharynx clear, and oral mucous membranes moist  NECK: no adenopathy, no asymmetry, masses, or scars and thyroid normal to palpation  RESP: lungs clear to auscultation - no rales, rhonchi or wheezes  CV: regular rate and rhythm, normal S1 S2, no S3 or S4, no murmur, click or rub, no peripheral edema and peripheral pulses strong  ABDOMEN: soft, nontender, no hepatosplenomegaly, no masses and bowel sounds normal  MS: no gross musculoskeletal defects noted, no edema  SKIN: no suspicious lesions or rashes  NEURO: Normal strength and tone, mentation intact and speech normal  PSYCH: mentation appears normal, affect normal/bright    Lab on 12/12/2023   Component Date Value Ref Range Status    Cholesterol 12/12/2023 174  <200 mg/dL Final    Triglycerides 12/12/2023 157 (H)  <150 mg/dL Final    Direct Measure HDL 12/12/2023 36 (L)  >=40 mg/dL Final    LDL Cholesterol Calculated 12/12/2023 107 (H)  <=100 mg/dL Final    Non HDL Cholesterol 12/12/2023 138 (H)  <130 mg/dL Final    Patient Fasting > 8hrs? 12/12/2023 Yes   Final       ----- Services Performed by a MEDICAL STUDENT in Presence of ATTENDING Physician-------              "

## 2024-01-04 NOTE — PATIENT INSTRUCTIONS
Preventive Health Recommendations  Male Ages 50 - 64    Yearly exam:             See your health care provider every year in order to  o   Review health changes.   o   Discuss preventive care.    o   Review your medicines if your doctor has prescribed any.   Have a cholesterol test every 5 years, or more frequently if you are at risk for high cholesterol/heart disease.   Have a diabetes test (fasting glucose) every three years. If you are at risk for diabetes, you should have this test more often.   Have a colonoscopy at age 45, or have a yearly FIT test (stool test). These exams will check for colon cancer.    Talk with your health care provider about whether or not a prostate cancer screening test (PSA) is right for you.  You should be tested each year for STDs (sexually transmitted diseases), if you re at risk.     Shots: Get a flu shot each year. Get a tetanus shot every 10 years.     Nutrition:  Eat at least 5 servings of fruits and vegetables daily.   Eat whole-grain bread, whole-wheat pasta and brown rice instead of white grains and rice.   Get adequate Calcium and Vitamin D.     Lifestyle  Exercise for at least 150 minutes a week (30 minutes a day, 5 days a week). This will help you control your weight and prevent disease.   Limit alcohol to one drink per day.   No smoking.   Wear sunscreen to prevent skin cancer.   See your dentist every six months for an exam and cleaning.   See your eye doctor every 1 to 2 years.     [facility]

## 2024-01-05 ENCOUNTER — MYC MEDICAL ADVICE (OUTPATIENT)
Dept: FAMILY MEDICINE | Facility: CLINIC | Age: 64
End: 2024-01-05
Payer: COMMERCIAL

## 2024-01-07 ENCOUNTER — HEALTH MAINTENANCE LETTER (OUTPATIENT)
Age: 64
End: 2024-01-07

## 2024-01-11 NOTE — TELEPHONE ENCOUNTER
He ended up having influenza A.  I had covered him with abx as well since we didn't have xray that day.  If he is feeling better there should not be a need to have this done anymore.      Deacon

## 2024-04-27 DIAGNOSIS — G62.9 NEUROPATHY: Primary | ICD-10-CM

## 2024-04-29 RX ORDER — GABAPENTIN 100 MG/1
100 CAPSULE ORAL AT BEDTIME
Qty: 90 CAPSULE | Refills: 0 | Status: SHIPPED | OUTPATIENT
Start: 2024-04-29 | End: 2024-08-07

## 2024-05-26 DIAGNOSIS — F51.01 PRIMARY INSOMNIA: ICD-10-CM

## 2024-05-28 RX ORDER — TRAZODONE HYDROCHLORIDE 50 MG/1
TABLET, FILM COATED ORAL
Qty: 90 TABLET | Refills: 0 | Status: SHIPPED | OUTPATIENT
Start: 2024-05-28 | End: 2024-09-05

## 2024-08-06 DIAGNOSIS — G62.9 NEUROPATHY: ICD-10-CM

## 2024-08-07 RX ORDER — GABAPENTIN 100 MG/1
100 CAPSULE ORAL AT BEDTIME
Qty: 90 CAPSULE | Refills: 1 | Status: SHIPPED | OUTPATIENT
Start: 2024-08-07

## 2024-09-05 DIAGNOSIS — F51.01 PRIMARY INSOMNIA: ICD-10-CM

## 2024-09-05 RX ORDER — TRAZODONE HYDROCHLORIDE 50 MG/1
TABLET, FILM COATED ORAL
Qty: 90 TABLET | Refills: 0 | Status: SHIPPED | OUTPATIENT
Start: 2024-09-05

## 2024-11-16 DIAGNOSIS — E78.00 ELEVATED CHOLESTEROL: ICD-10-CM

## 2024-11-18 RX ORDER — ATORVASTATIN CALCIUM 10 MG/1
10 TABLET, FILM COATED ORAL DAILY
Qty: 90 TABLET | Refills: 0 | Status: SHIPPED | OUTPATIENT
Start: 2024-11-18

## 2024-12-19 DIAGNOSIS — F51.01 PRIMARY INSOMNIA: ICD-10-CM

## 2024-12-19 RX ORDER — TRAZODONE HYDROCHLORIDE 50 MG/1
TABLET, FILM COATED ORAL
Qty: 90 TABLET | Refills: 0 | Status: SHIPPED | OUTPATIENT
Start: 2024-12-19

## 2025-01-25 ENCOUNTER — HEALTH MAINTENANCE LETTER (OUTPATIENT)
Age: 65
End: 2025-01-25

## 2025-02-13 SDOH — HEALTH STABILITY: PHYSICAL HEALTH: ON AVERAGE, HOW MANY DAYS PER WEEK DO YOU ENGAGE IN MODERATE TO STRENUOUS EXERCISE (LIKE A BRISK WALK)?: 0 DAYS

## 2025-02-13 SDOH — HEALTH STABILITY: PHYSICAL HEALTH: ON AVERAGE, HOW MANY MINUTES DO YOU ENGAGE IN EXERCISE AT THIS LEVEL?: 0 MIN

## 2025-02-13 ASSESSMENT — SOCIAL DETERMINANTS OF HEALTH (SDOH): HOW OFTEN DO YOU GET TOGETHER WITH FRIENDS OR RELATIVES?: TWICE A WEEK

## 2025-02-16 DIAGNOSIS — G62.9 NEUROPATHY: ICD-10-CM

## 2025-02-16 RX ORDER — GABAPENTIN 100 MG/1
100 CAPSULE ORAL AT BEDTIME
Qty: 90 CAPSULE | Refills: 0 | Status: SHIPPED | OUTPATIENT
Start: 2025-02-16 | End: 2025-02-18

## 2025-02-18 ENCOUNTER — OFFICE VISIT (OUTPATIENT)
Dept: FAMILY MEDICINE | Facility: CLINIC | Age: 65
End: 2025-02-18
Payer: COMMERCIAL

## 2025-02-18 VITALS
TEMPERATURE: 98.7 F | HEART RATE: 67 BPM | WEIGHT: 233.2 LBS | BODY MASS INDEX: 30.91 KG/M2 | HEIGHT: 73 IN | SYSTOLIC BLOOD PRESSURE: 120 MMHG | DIASTOLIC BLOOD PRESSURE: 70 MMHG | OXYGEN SATURATION: 98 % | RESPIRATION RATE: 14 BRPM

## 2025-02-18 DIAGNOSIS — G62.9 NEUROPATHY: ICD-10-CM

## 2025-02-18 DIAGNOSIS — Z00.00 ROUTINE GENERAL MEDICAL EXAMINATION AT A HEALTH CARE FACILITY: Primary | ICD-10-CM

## 2025-02-18 DIAGNOSIS — Z12.5 SCREENING FOR PROSTATE CANCER: ICD-10-CM

## 2025-02-18 DIAGNOSIS — E78.00 ELEVATED CHOLESTEROL: ICD-10-CM

## 2025-02-18 DIAGNOSIS — M25.511 ACUTE PAIN OF RIGHT SHOULDER: ICD-10-CM

## 2025-02-18 DIAGNOSIS — F51.01 PRIMARY INSOMNIA: ICD-10-CM

## 2025-02-18 PROCEDURE — 99213 OFFICE O/P EST LOW 20 MIN: CPT | Mod: 25 | Performed by: PHYSICIAN ASSISTANT

## 2025-02-18 PROCEDURE — 1126F AMNT PAIN NOTED NONE PRSNT: CPT | Performed by: PHYSICIAN ASSISTANT

## 2025-02-18 PROCEDURE — 99396 PREV VISIT EST AGE 40-64: CPT | Performed by: PHYSICIAN ASSISTANT

## 2025-02-18 PROCEDURE — 3078F DIAST BP <80 MM HG: CPT | Performed by: PHYSICIAN ASSISTANT

## 2025-02-18 PROCEDURE — 3074F SYST BP LT 130 MM HG: CPT | Performed by: PHYSICIAN ASSISTANT

## 2025-02-18 PROCEDURE — G2211 COMPLEX E/M VISIT ADD ON: HCPCS | Performed by: PHYSICIAN ASSISTANT

## 2025-02-18 RX ORDER — GABAPENTIN 300 MG/1
300 CAPSULE ORAL AT BEDTIME
Qty: 90 CAPSULE | Refills: 3 | Status: SHIPPED | OUTPATIENT
Start: 2025-02-18

## 2025-02-18 RX ORDER — ATORVASTATIN CALCIUM 10 MG/1
10 TABLET, FILM COATED ORAL DAILY
Qty: 90 TABLET | Refills: 3 | Status: SHIPPED | OUTPATIENT
Start: 2025-02-18

## 2025-02-18 RX ORDER — FENOFIBRATE 160 MG/1
160 TABLET ORAL DAILY
Qty: 90 TABLET | Refills: 3 | Status: SHIPPED | OUTPATIENT
Start: 2025-02-18

## 2025-02-18 RX ORDER — TRAZODONE HYDROCHLORIDE 50 MG/1
TABLET ORAL
Qty: 90 TABLET | Refills: 0 | Status: SHIPPED | OUTPATIENT
Start: 2025-02-18

## 2025-02-18 ASSESSMENT — PAIN SCALES - GENERAL: PAINLEVEL_OUTOF10: NO PAIN (0)

## 2025-02-18 NOTE — PROGRESS NOTES
"Preventive Care Visit  [facility] [place] GABRIELLEMOCLEO Sotelo PA-C, Family Medicine  Feb 18, 2025      Assessment & Plan     Routine general medical examination at a health care facility  Reviewed personal and family history. Reviewed age appropriate screenings. Reviewed healthy BP and BMI ranges. Counseled on lifestyle modifications for optimal mental and physical health.  Discussed age-appropriate health maintanence. Recommended any needed vaccinations. Continue to focus on well balanced diet and exercise     - Lipid panel reflex to direct LDL Fasting; Future  - Comprehensive metabolic panel (BMP + Alb, Alk Phos, ALT, AST, Total. Bili, TP); Future    Elevated cholesterol  Rechecking labs.  Refills given today.  - atorvastatin (LIPITOR) 10 MG tablet; Take 1 tablet (10 mg) by mouth daily.  - Lipid panel reflex to direct LDL Fasting; Future  - fenofibrate (TRIGLIDE/LOFIBRA) 160 MG tablet; Take 1 tablet (160 mg) by mouth daily.    Primary insomnia  Refilling.  - traZODone (DESYREL) 50 MG tablet; TAKE ONE TABLET BY MOUTH EVERY NIGHT AT BEDTIME    Neuropathy  Increase dose to 300mg.  Patient still declining referral to neurology.  - gabapentin (NEURONTIN) 300 MG capsule; Take 1 capsule (300 mg) by mouth at bedtime.  - TSH with free T4 reflex; Future  - Vitamin B12; Future    Acute pain of right shoulder  Referral to physical therapy.  - Physical Therapy  Referral; Future    Screening for prostate cancer    - PSA, screen; Future    Patient has been advised of split billing requirements and indicates understanding: Yes        BMI  Estimated body mass index is 30.77 kg/m  as calculated from the following:    Height as of this encounter: 1.854 m (6' 1\").    Weight as of this encounter: 105.8 kg (233 lb 3.2 oz).   Weight management plan: Discussed healthy diet and exercise guidelines    Counseling  Appropriate preventive services were addressed with this patient via screening, questionnaire, or " discussion as appropriate for fall prevention, nutrition, physical activity, Tobacco-use cessation, social engagement, weight loss and cognition.  Checklist reviewing preventive services available has been given to the patient.      The longitudinal plan of care for the diagnosis(es)/condition(s) as documented were addressed during this visit. Due to the added complexity in care, I will continue to support Ronald in the subsequent management and with ongoing continuity of care.    Subjective   Ronald is a 64 year old, presenting for the following:  Physical           HPI  Patient here for a physical today.    Neuropathy- bothering him quite a bit.  L >> R    Shoulder injury, right - carrying granddaughter tripped an fell while holding her last spring. Pain with lifting arm.  Had previous injury also years ago.        Health Care Directive  Patient does not have a Health Care Directive: Discussed advance care planning with patient; however, patient declined at this time.      2/13/2025   General Health   How would you rate your overall physical health? Good   Feel stress (tense, anxious, or unable to sleep) To some extent   (!) STRESS CONCERN      2/13/2025   Nutrition   Three or more servings of calcium each day? (!) NO   Diet: Regular (no restrictions)   How many servings of fruit and vegetables per day? (!) 2-3   How many sweetened beverages each day? (!) 2         2/13/2025   Exercise   Days per week of moderate/strenous exercise 0 days   Average minutes spent exercising at this level 0 min   (!) EXERCISE CONCERN      2/13/2025   Social Factors   Frequency of gathering with friends or relatives Twice a week   Worry food won't last until get money to buy more No   Food not last or not have enough money for food? No   Do you have housing? (Housing is defined as stable permanent housing and does not include staying ouside in a car, in a tent, in an abandoned building, in an overnight shelter, or couch-surfing.) Yes    Are you worried about losing your housing? Yes   Lack of transportation? No   Unable to get utilities (heat,electricity)? No   Want help with housing or utility concern? No   (!) HOUSING CONCERN PRESENT      2/18/2025   Fall Risk   Gait Speed Test (Document in seconds) 4   Gait Speed Test Interpretation Less than or equal to 5.00 seconds - PASS          2/13/2025   Dental   Dentist two times every year? Yes         1/3/2025   TB Screening   Were you born outside of the [place]? No         Today's PHQ-2 Score:       2/18/2025     1:13 PM   PHQ-2 ( 1999 [employer])   Q1: Little interest or pleasure in doing things 0   Q2: Feeling down, depressed or hopeless 1   PHQ-2 Score 1    Q1: Little interest or pleasure in doing things Not at all   Q2: Feeling down, depressed or hopeless Several days   PHQ-2 Score 1       Patient-reported           2/13/2025   Substance Use   Alcohol more than 3/day or more than 7/wk No   Do you use any other substances recreationally? No     Social History     Tobacco Use    Smoking status: Never    Smokeless tobacco: Never   Vaping Use    Vaping status: Never Used   Substance Use Topics    Alcohol use: Yes     Comment: limited    Drug use: No           2/13/2025   STI Screening   New sexual partner(s) since last STI/HIV test? No   Last PSA:   PSA   Date Value Ref Range Status   12/04/2019 0.31 0 - 4 ug/L Final     Comment:     Assay Method:  Chemiluminescence using Siemens Vista analyzer     ASCVD Risk   The 10-year ASCVD risk score (Ana Paula RODRÍGUEZ, et al., 2019) is: 15.5%    Values used to calculate the score:      Age: 64 years      Sex: Male      Is Non- : No      Diabetic: No      Tobacco smoker: No      Systolic Blood Pressure: 143 mmHg      Is BP treated: No      HDL Cholesterol: 36 mg/dL      Total Cholesterol: 174 mg/dL           Reviewed and updated as needed this visit by Provider                          Review of Systems  Constitutional, HEENT, cardiovascular,  "pulmonary, gi and gu systems are negative, except as otherwise noted.     Objective    Exam  BP (!) 143/78 (BP Location: Right arm, Patient Position: Sitting, Cuff Size: Adult Large)   Pulse 67   Temp 98.7  F (37.1  C) (Oral)   Resp 14   Ht 1.854 m (6' 1\")   Wt 105.8 kg (233 lb 3.2 oz)   SpO2 98%   BMI 30.77 kg/m     Estimated body mass index is 30.77 kg/m  as calculated from the following:    Height as of this encounter: 1.854 m (6' 1\").    Weight as of this encounter: 105.8 kg (233 lb 3.2 oz).    Physical Exam  GENERAL: alert and no distress  EYES: Eyes grossly normal to inspection, PERRL and conjunctivae and sclerae normal  HENT: ear canals and TM's normal, nose and mouth without ulcers or lesions  NECK: no adenopathy, no asymmetry, masses, or scars  RESP: lungs clear to auscultation - no rales, rhonchi or wheezes  CV: regular rate and rhythm, normal S1 S2, no S3 or S4, no murmur, click or rub, no peripheral edema  ABDOMEN: soft, nontender, no hepatosplenomegaly, no masses and bowel sounds normal  MS: no gross musculoskeletal defects noted, no edema  SKIN: no suspicious lesions or rashes  NEURO: Normal strength and tone, mentation intact and speech normal  PSYCH: mentation appears normal, affect normal/bright        Signed Electronically by: Deacon Sotelo PA-C    "

## 2025-02-18 NOTE — PATIENT INSTRUCTIONS
Patient Education   Preventive Care Advice   This is general advice given by our system to help you stay healthy. However, your care team may have specific advice just for you. Please talk to your care team about your preventive care needs.  Nutrition  Eat 5 or more servings of fruits and vegetables each day.  Try wheat bread, brown rice and whole grain pasta (instead of white bread, rice, and pasta).  Get enough calcium and vitamin D. Check the label on foods and aim for 100% of the RDA (recommended daily allowance).  Lifestyle  Exercise at least 150 minutes each week  (30 minutes a day, 5 days a week).  Do muscle strengthening activities 2 days a week. These help control your weight and prevent disease.  No smoking.  Wear sunscreen to prevent skin cancer.  Have a dental exam and cleaning every 6 months.  Yearly exams  See your health care team every year to talk about:  Any changes in your health.  Any medicines your care team has prescribed.  Preventive care, family planning, and ways to prevent chronic diseases.  Shots (vaccines)   HPV shots (up to age 26), if you've never had them before.  Hepatitis B shots (up to age 59), if you've never had them before.  COVID-19 shot: Get this shot when it's due.  Flu shot: Get a flu shot every year.  Tetanus shot: Get a tetanus shot every 10 years.  Pneumococcal, hepatitis A, and RSV shots: Ask your care team if you need these based on your risk.  Shingles shot (for age 50 and up)  General health tests  Diabetes screening:  Starting at age 35, Get screened for diabetes at least every 3 years.  If you are younger than age 35, ask your care team if you should be screened for diabetes.  Cholesterol test: At age 39, start having a cholesterol test every 5 years, or more often if advised.  Bone density scan (DEXA): At age 50, ask your care team if you should have this scan for osteoporosis (brittle bones).  Hepatitis C: Get tested at least once in your life.  STIs (sexually  transmitted infections)  Before age 24: Ask your care team if you should be screened for STIs.  After age 24: Get screened for STIs if you're at risk. You are at risk for STIs (including HIV) if:  You are sexually active with more than one person.  You don't use condoms every time.  You or a partner was diagnosed with a sexually transmitted infection.  If you are at risk for HIV, ask about PrEP medicine to prevent HIV.  Get tested for HIV at least once in your life, whether you are at risk for HIV or not.  Cancer screening tests  Cervical cancer screening: If you have a cervix, begin getting regular cervical cancer screening tests starting at age 21.  Breast cancer scan (mammogram): If you've ever had breasts, begin having regular mammograms starting at age 40. This is a scan to check for breast cancer.  Colon cancer screening: It is important to start screening for colon cancer at age 45.  Have a colonoscopy test every 10 years (or more often if you're at risk) Or, ask your provider about stool tests like a FIT test every year or Cologuard test every 3 years.  To learn more about your testing options, visit:   .  For help making a decision, visit:   https://bit.ly/dx67168.  Prostate cancer screening test: If you have a prostate, ask your care team if a prostate cancer screening test (PSA) at age 55 is right for you.  Lung cancer screening: If you are a current or former smoker ages 50 to 80, ask your care team if ongoing lung cancer screenings are right for you.  For informational purposes only. Not to replace the advice of your health care provider. Copyright   2023 [facility]. All rights reserved. Clinically reviewed by the [facility]. [employer] 095084 - REV 01/24.  Preventing Falls: Care Instructions  Injuries and health problems such as trouble walking or poor eyesight can increase your risk of falling. So can some medicines. But there are things you can do to help  "prevent falls. You can exercise to get stronger. You can also arrange your home to make it safer.    Talk to your doctor about the medicines you take. Ask if any of them increase the risk of falls and whether they can be changed or stopped.   Try to exercise regularly. It can help improve your strength and balance. This can help lower your risk of falling.         Practice fall safety and prevention.   Wear low-heeled shoes that fit well and give your feet good support. Talk to your doctor if you have foot problems that make this hard.  Carry a cellphone or wear a medical alert device that you can use to call for help.  Use stepladders instead of chairs to reach high objects. Don't climb if you're at risk for falls. Ask for help, if needed.  Wear the correct eyeglasses, if you need them.        Make your home safer.   Remove rugs, cords, clutter, and furniture from walkways.  Keep your house well lit. Use night-lights in hallways and bathrooms.  Install and use sturdy handrails on stairways.  Wear nonskid footwear, even inside. Don't walk barefoot or in socks without shoes.        Be safe outside.   Use handrails, curb cuts, and ramps whenever possible.  Keep your hands free by using a shoulder bag or backpack.  Try to walk in well-lit areas. Watch out for uneven ground, changes in pavement, and debris.  Be careful in the winter. Walk on the grass or gravel when sidewalks are slippery. Use de-icer on steps and walkways. Add non-slip devices to shoes.    Put grab bars and nonskid mats in your shower or tub and near the toilet. Try to use a shower chair or bath bench when bathing.   Get into a tub or shower by putting in your weaker leg first. Get out with your strong side first. Have a phone or medical alert device in the bathroom with you.   Where can you learn more?  Go to https://www.[employer].net/patiented  Enter G117 in the search box to learn more about \"Preventing Falls: Care Instructions.\"  Current as of: " July 31, 2024  Content Version: 14.3    2024 [employer].   Care instructions adapted under license by your healthcare professional. If you have questions about a medical condition or this instruction, always ask your healthcare professional. [employer] disclaims any warranty or liability for your use of this information.    Learning About Stress  What is stress?     Stress is your body's response to a hard situation. Your body can have a physical, emotional, or mental response. Stress is a fact of life for most people, and it affects everyone differently. What causes stress for you may not be stressful for someone else.  A lot of things can cause stress. You may feel stress when you go on a job interview, take a test, or run a race. This kind of short-term stress is normal and even useful. It can help you if you need to work hard or react quickly. For example, stress can help you finish an important job on time.  Long-term stress is caused by ongoing stressful situations or events. Examples of long-term stress include long-term health problems, ongoing problems at work, or conflicts in your family. Long-term stress can harm your health.  How does stress affect your health?  When you are stressed, your body responds as though you are in danger. It makes hormones that speed up your heart, make you breathe faster, and give you a burst of energy. This is called the fight-or-flight stress response. If the stress is over quickly, your body goes back to normal and no harm is done.  But if stress happens too often or lasts too long, it can have bad effects. Long-term stress can make you more likely to get sick, and it can make symptoms of some diseases worse. If you tense up when you are stressed, you may develop neck, shoulder, or low back pain. Stress is linked to high blood pressure and heart disease.  Stress also harms your emotional health. It can make you rubio, tense, or depressed. Your  relationships may suffer, and you may not do well at work or school.  What can you do to manage stress?  You can try these things to help manage stress:   Do something active. Exercise or activity can help reduce stress. Walking is a great way to get started. Even everyday activities such as housecleaning or yard work can help.  Try yoga or cherelle chi. These techniques combine exercise and meditation. You may need some training at first to learn them.  Do something you enjoy. For example, listen to music or go to a movie. Practice your hobby or do volunteer work.  Meditate. This can help you relax, because you are not worrying about what happened before or what may happen in the future.  Do guided imagery. Imagine yourself in any setting that helps you feel calm. You can use online videos, books, or a teacher to guide you.  Do breathing exercises. For example:  From a standing position, bend forward from the waist with your knees slightly bent. Let your arms dangle close to the floor.  Breathe in slowly and deeply as you return to a standing position. Roll up slowly and lift your head last.  Hold your breath for just a few seconds in the standing position.  Breathe out slowly and bend forward from the waist.  Let your feelings out. Talk, laugh, cry, and express anger when you need to. Talking with supportive friends or family, a counselor, or a ajith leader about your feelings is a healthy way to relieve stress. Avoid discussing your feelings with people who make you feel worse.  Write. It may help to write about things that are bothering you. This helps you find out how much stress you feel and what is causing it. When you know this, you can find better ways to cope.  What can you do to prevent stress?  You might try some of these things to help prevent stress:  Manage your time. This helps you find time to do the things you want and need to do.  Get enough sleep. Your body recovers from the stresses of the day while  "you are sleeping.  Get support. Your family, friends, and community can make a difference in how you experience stress.  Limit your news feed. Avoid or limit time on social media or news that may make you feel stressed.  Do something active. Exercise or activity can help reduce stress. Walking is a great way to get started.  Where can you learn more?  Go to https://www.[employer].net/patiented  Enter N032 in the search box to learn more about \"Learning About Stress.\"  Current as of: October 24, 2023  Content Version: 14.3    2024 [employer].   Care instructions adapted under license by your healthcare professional. If you have questions about a medical condition or this instruction, always ask your healthcare professional. [employer] disclaims any warranty or liability for your use of this information.       "

## 2025-02-20 ENCOUNTER — LAB (OUTPATIENT)
Dept: LAB | Facility: CLINIC | Age: 65
End: 2025-02-20
Payer: COMMERCIAL

## 2025-02-20 DIAGNOSIS — E78.00 ELEVATED CHOLESTEROL: ICD-10-CM

## 2025-02-20 DIAGNOSIS — Z12.5 SCREENING FOR PROSTATE CANCER: ICD-10-CM

## 2025-02-20 DIAGNOSIS — G62.9 NEUROPATHY: ICD-10-CM

## 2025-02-20 DIAGNOSIS — Z00.00 ROUTINE GENERAL MEDICAL EXAMINATION AT A HEALTH CARE FACILITY: ICD-10-CM

## 2025-02-20 LAB
ALBUMIN SERPL BCG-MCNC: 4.3 G/DL (ref 3.5–5.2)
ALP SERPL-CCNC: 57 U/L (ref 40–150)
ALT SERPL W P-5'-P-CCNC: 42 U/L (ref 0–70)
ANION GAP SERPL CALCULATED.3IONS-SCNC: 11 MMOL/L (ref 7–15)
AST SERPL W P-5'-P-CCNC: 35 U/L (ref 0–45)
BILIRUB SERPL-MCNC: 0.5 MG/DL
BUN SERPL-MCNC: 17.1 MG/DL (ref 8–23)
CALCIUM SERPL-MCNC: 9.6 MG/DL (ref 8.8–10.4)
CHLORIDE SERPL-SCNC: 104 MMOL/L (ref 98–107)
CHOLEST SERPL-MCNC: 203 MG/DL
CREAT SERPL-MCNC: 1.09 MG/DL (ref 0.67–1.17)
EGFRCR SERPLBLD CKD-EPI 2021: 76 ML/MIN/1.73M2
FASTING STATUS PATIENT QL REPORTED: YES
FASTING STATUS PATIENT QL REPORTED: YES
GLUCOSE SERPL-MCNC: 93 MG/DL (ref 70–99)
HCO3 SERPL-SCNC: 25 MMOL/L (ref 22–29)
HDLC SERPL-MCNC: 27 MG/DL
LDLC SERPL CALC-MCNC: ABNORMAL MG/DL
LDLC SERPL DIRECT ASSAY-MCNC: 95 MG/DL
NONHDLC SERPL-MCNC: 176 MG/DL
POTASSIUM SERPL-SCNC: 4.1 MMOL/L (ref 3.4–5.3)
PROT SERPL-MCNC: 7.5 G/DL (ref 6.4–8.3)
PSA SERPL DL<=0.01 NG/ML-MCNC: 0.33 NG/ML (ref 0–4.5)
SODIUM SERPL-SCNC: 140 MMOL/L (ref 135–145)
TRIGL SERPL-MCNC: 428 MG/DL
TSH SERPL DL<=0.005 MIU/L-ACNC: 1.4 UIU/ML (ref 0.3–4.2)
VIT B12 SERPL-MCNC: 1757 PG/ML (ref 232–1245)

## 2025-02-26 ENCOUNTER — TELEPHONE (OUTPATIENT)
Dept: FAMILY MEDICINE | Facility: CLINIC | Age: 65
End: 2025-02-26
Payer: COMMERCIAL

## 2025-02-26 DIAGNOSIS — E78.00 ELEVATED CHOLESTEROL: Primary | ICD-10-CM

## 2025-02-26 NOTE — TELEPHONE ENCOUNTER
----- Message from Deacon Sotelo sent at 2/26/2025  2:24 PM CST -----  Please call Ronald.  I am not sure why his B12 is so high.  He is having neuropathy however and this can cause it.  Is he taking a supplement?    Thyroid normal.  CMP normal.  PSA normal also.    Triglycerides are quite high.  Is he taking his fenofibrate?  Statin?        Deacon

## 2025-02-26 NOTE — TELEPHONE ENCOUNTER
Outgoing call to patient. Attempt # 1.     Left message to call back and talk with a nurse.    Reason for call: relay message from provider below and gather information requested by provider.     Linda Lowe, RN, BSN, PHN  [facility]

## 2025-02-27 NOTE — TELEPHONE ENCOUNTER
Called pt and LMTCB or to respond to MCM sent.     BENTON KeyN, RN     [facility]    02/27/2025 at 8:42 AM

## 2025-02-27 NOTE — TELEPHONE ENCOUNTER
"Ok, have him stop the B12, or at least cut back.  I'd say he really only needs about 500mcg daily.  It could be making neuropathy worse.    Let's recheck the lipids fasting in 6 months.  Just to be sure about the numbers before I change anything with meds.  I\"ll put in that order now.        Deacon"

## 2025-02-27 NOTE — TELEPHONE ENCOUNTER
PT called back.     Pt states he has been taking Vitamin B12 3,000mcg daily.    Pt states he is taking both fenofibrate and statin. Pt states that there was a short period of time prior to the appt in which he could not get his fenofibrate filled.    Routing to PCP to review    Ruba Quintero RN on 2/27/2025 at 3:17 PM

## 2025-02-27 NOTE — TELEPHONE ENCOUNTER
Called pt and relayed provider message below. Patient was given an opportunity to ask questions, verbalized understanding of plan, and is agreeable.    Pt will call back to get scheduled for lab only to check lipids in August.    Machelle HARRELL RN

## 2025-03-03 ENCOUNTER — THERAPY VISIT (OUTPATIENT)
Dept: PHYSICAL THERAPY | Facility: CLINIC | Age: 65
End: 2025-03-03
Attending: PHYSICIAN ASSISTANT
Payer: COMMERCIAL

## 2025-03-03 DIAGNOSIS — M25.511 ACUTE PAIN OF RIGHT SHOULDER: ICD-10-CM

## 2025-03-03 PROCEDURE — 97161 PT EVAL LOW COMPLEX 20 MIN: CPT | Mod: GP | Performed by: PHYSICAL THERAPIST

## 2025-03-03 PROCEDURE — 97112 NEUROMUSCULAR REEDUCATION: CPT | Mod: GP | Performed by: PHYSICAL THERAPIST

## 2025-03-03 PROCEDURE — 97110 THERAPEUTIC EXERCISES: CPT | Mod: GP | Performed by: PHYSICAL THERAPIST

## 2025-03-03 ASSESSMENT — ACTIVITIES OF DAILY LIVING (ADL)
PUTTING_ON_YOUR_PANTS: 0
WHEN_LYING_ON_THE_INVOLVED_SIDE: 4
PUTTING_ON_AN_UNDERSHIRT_OR_A_PULLOVER_SWEATER: 1
CARRYING_A_HEAVY_OBJECT_OF_10_POUNDS: 0
REMOVING_SOMETHING_FROM_YOUR_BACK_POCKET: 1
TOUCHING_THE_BACK_OF_YOUR_NECK: 0
AT_ITS_WORST?: 7
WASHING_YOUR_BACK: 2
REACHING_FOR_SOMETHING_ON_A_HIGH_SHELF: 7
PLEASE_INDICATE_YOR_PRIMARY_REASON_FOR_REFERRAL_TO_THERAPY:: SHOULDER
PUTTING_ON_A_SHIRT_THAT_BUTTONS_DOWN_THE_FRONT: 0
PUSHING_WITH_THE_INVOLVED_ARM: 0
PLACING_AN_OBJECT_ON_A_HIGH_SHELF: 6
WASHING_YOUR_HAIR?: 0

## 2025-03-03 NOTE — PROGRESS NOTES
PHYSICAL THERAPY EVALUATION  Type of Visit: Evaluation        Fall Risk Screen:  Have you fallen 2 or more times in the past year?: Yes  Have you fallen and had an injury in the past year?: Yes  Is patient a fall risk?: No  Fall screen comments: tripped while carrying grandaughter last May, will assess as needed, but not related to balance it appears    Subjective         Presenting condition or subjective complaint: Rt Shoulder, sometimes left    States last May 2024 was carrying granddaughter and tripped (had to step over small fence in garden)and fell, had some pain right shoulder afer. On and off pain since that time, will recent flare up beginning of the year.  Past 2 weeks has been feeling better. Intermittent right shoulder pain with reaching overhead.    Date of onset: 02/18/25 (MD referral date)    Relevant medical history:     Dates & types of surgery: Torn ? In both shoulders 2013 and 2015    Prior diagnostic imaging/testing results:       Prior therapy history for the same diagnosis, illness or injury: No          Living Environment  Social support: With a significant other or spouse   Type of home: House   Stairs to enter the home: Yes 7 Is there a railing: Yes     Ramp: No   Stairs inside the home: Yes 7 Is there a railing: Yes     Help at home: None  Equipment owned:       Employment: No    Hobbies/Interests: Gardening    Patient goals for therapy: Lift heavy objects above my head         Objective   SHOULDER EVALUATION  PAIN: Pain Location: right shoulder more anteriorly at times.   Pain Frequency: intermittent  Pain is Exacerbated By: reaching, lifting at or above shoulder height  Pain is Relieved By: rest  INTEGUMENTARY (edema, incisions): WNL  POSTURE: Sitting Posture: Rounded shoulders    ROM:  right shoulder AROM is WNL with end range tightness with flexion and abduction but no pain; IR behind back T12; ER 65    STRENGTH:   Pain: - none + mild ++ moderate +++ severe  Strength Scale: 0-5/5 Left  Right   Shoulder Flexion 5-, + (mild) 5   Shoulder Extension 5 5   Shoulder Abduction 5-, + (mild) 5-   Shoulder Adduction     Shoulder Internal Rotation 5 5   Shoulder External Rotation 4+, + (mild) 5-   Shoulder Horizontal Abduction     Shoulder Horizontal Adduction     Elbow Flexion 5 5   Elbow Extension 5 5   Mid Trap 5- 5-   Lower Trap 5- 5-   Rhomboid     Serratus Anterior       FLEXIBILITY: WFL  SPECIAL TESTS: WNL  PALPATION:  right anterior over biceps tendon region; lateral shoulder  JOINT MOBILITY:  decreased inferior and posterior GH glide right  CERVICAL SCREEN: WNL    Assessment & Plan   CLINICAL IMPRESSIONS  Medical Diagnosis: acute pain right shoulder    Treatment Diagnosis: acute pain right shoulder   Impression/Assessment: Patient is a 64 year old male with right shoulder complaints.  The following significant findings have been identified: Pain, Decreased ROM/flexibility, Decreased joint mobility, Decreased strength, Impaired muscle performance, and Decreased activity tolerance. These impairments interfere with their ability to perform self care tasks, recreational activities, household chores, driving , household mobility, and community mobility as compared to previous level of function.     Clinical Decision Making (Complexity):  Clinical Presentation: Stable/Uncomplicated  Clinical Presentation Rationale: based on medical and personal factors listed in PT evaluation  Clinical Decision Making (Complexity): Low complexity    PLAN OF CARE  Treatment Interventions:  Interventions: Manual Therapy, Neuromuscular Re-education, Therapeutic Activity, Therapeutic Exercise, Self-Care/Home Management    Long Term Goals     PT Goal 1  Goal Identifier: Reaching  Goal Description: Reach; behind back; to counter height; to shoulder level; overhead; out to the side; across body; Unrestricted reaching.   for independent personal hygiene; for dressing;for bathing; for meal preparation; for safe driving, hook seat  belt, reach shift lever and turn signal, using both hands on steering wheel  Rationale: to maximize safety and independence with performance of ADLs and functional tasks;to maximize safety and independence within the home;to maximize safety and independence within the community;to maximize safety and independence with transportation;to maximize safety and independence with self cares  Target Date: 04/28/25  PT Goal 2  Goal Identifier: Lifting/carrying  Goal Description: Lift or carry an item to counter height weighing; Lift an item to shoulder level; Lift an item overhead; Lift an item to lower cupboard height; item weighing 10 lb;  Rationale: to maximize safety and independence with performance of ADLs and functional tasks;to maximize safety and independence within the home;to maximize safety and independence within the community;to maximize safety and independence with transportation;to maximize safety and independence with self cares  Target Date: 04/28/25      Frequency of Treatment: 1/week decreasing to every 2 weeks as progresses  Duration of Treatment: 8 weeks      Education Assessment:   Learner/Method: Patient;No Barriers to Learning;Pictures/Video  Education Comments: Educated pt on findings of the evaluation and reasoning for plan of care.  Pt was able to understand how treatment plan aligns with goals.    Risks and benefits of evaluation/treatment have been explained.   Patient/Family/caregiver agrees with Plan of Care.     Evaluation Time:     PT Eval, Low Complexity Minutes (47028): 17       Signing Clinician: Laurie Tate PT

## 2025-03-10 ENCOUNTER — THERAPY VISIT (OUTPATIENT)
Dept: PHYSICAL THERAPY | Facility: CLINIC | Age: 65
End: 2025-03-10
Attending: PHYSICIAN ASSISTANT
Payer: COMMERCIAL

## 2025-03-10 DIAGNOSIS — M25.511 ACUTE PAIN OF RIGHT SHOULDER: Primary | ICD-10-CM

## 2025-03-10 PROCEDURE — 97112 NEUROMUSCULAR REEDUCATION: CPT | Mod: GP | Performed by: PHYSICAL THERAPIST

## 2025-03-10 PROCEDURE — 97110 THERAPEUTIC EXERCISES: CPT | Mod: GP | Performed by: PHYSICAL THERAPIST

## 2025-03-17 ENCOUNTER — THERAPY VISIT (OUTPATIENT)
Dept: PHYSICAL THERAPY | Facility: CLINIC | Age: 65
End: 2025-03-17
Attending: PHYSICIAN ASSISTANT
Payer: COMMERCIAL

## 2025-03-17 DIAGNOSIS — M25.511 ACUTE PAIN OF RIGHT SHOULDER: Primary | ICD-10-CM

## 2025-03-17 PROCEDURE — 97110 THERAPEUTIC EXERCISES: CPT | Mod: GP | Performed by: PHYSICAL THERAPIST

## 2025-03-17 PROCEDURE — 97112 NEUROMUSCULAR REEDUCATION: CPT | Mod: GP | Performed by: PHYSICAL THERAPIST

## 2025-03-31 ENCOUNTER — THERAPY VISIT (OUTPATIENT)
Dept: PHYSICAL THERAPY | Facility: CLINIC | Age: 65
End: 2025-03-31
Payer: COMMERCIAL

## 2025-03-31 DIAGNOSIS — M25.511 ACUTE PAIN OF RIGHT SHOULDER: Primary | ICD-10-CM

## 2025-03-31 PROCEDURE — 97110 THERAPEUTIC EXERCISES: CPT | Mod: GP | Performed by: PHYSICAL THERAPIST

## 2025-03-31 PROCEDURE — 97112 NEUROMUSCULAR REEDUCATION: CPT | Mod: GP | Performed by: PHYSICAL THERAPIST

## 2025-04-14 ENCOUNTER — THERAPY VISIT (OUTPATIENT)
Dept: PHYSICAL THERAPY | Facility: CLINIC | Age: 65
End: 2025-04-14
Payer: COMMERCIAL

## 2025-04-14 DIAGNOSIS — M25.511 ACUTE PAIN OF RIGHT SHOULDER: Primary | ICD-10-CM

## 2025-04-14 PROCEDURE — 97140 MANUAL THERAPY 1/> REGIONS: CPT | Mod: GP | Performed by: PHYSICAL THERAPIST

## 2025-04-14 PROCEDURE — 97110 THERAPEUTIC EXERCISES: CPT | Mod: GP | Performed by: PHYSICAL THERAPIST

## 2025-04-28 ENCOUNTER — THERAPY VISIT (OUTPATIENT)
Dept: PHYSICAL THERAPY | Facility: CLINIC | Age: 65
End: 2025-04-28
Payer: COMMERCIAL

## 2025-04-28 DIAGNOSIS — M25.511 ACUTE PAIN OF RIGHT SHOULDER: Primary | ICD-10-CM

## 2025-04-28 PROCEDURE — 97110 THERAPEUTIC EXERCISES: CPT | Mod: GP | Performed by: PHYSICAL THERAPIST
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
## 2025-05-06 NOTE — TELEPHONE ENCOUNTER
Outpatient Rehab    Physical Therapy Visit    Patient Name: Nitin Mcconnell  MRN: [id number]  YOB: 1975  Encounter Date: 5/6/2025    Therapy Diagnosis:   Encounter Diagnoses   Name Primary?    Hip weakness Yes    Quadriceps weakness     Impaired functional mobility and activity tolerance      Physician: Angelia Rios NP    Physician Orders: Eval and Treat  Medical Diagnosis: Posterior knee pain, right    Visit # / Visits Authorized:  1 / 20  Insurance Authorization Period: 5/2/2025 to 12/31/2025  Date of Evaluation: 5/2/2025  Plan of Care Certification: 5/2/2025 to 7/11/2025      PT/PTA: PT   Number of PTA visits since last PT visit:0  Time In: 0700   Time Out: 0754  Total Time (in minutes): 54   Total Billable Time (in minutes): 54    FOTO:  Intake Score:  %  Survey Score 2:  %  Survey Score 3:  %         Subjective   She was compliant with her home exercise program. SHe is having some stiffness on both sides in her knees..  Pain reported as 0/10.      Objective            Treatment:  Therapeutic Exercise  TE 5: supine HS stretch: 3 x 30 each leg  TE 6: prone quad stretch: 2' each leg  TE 7: Heel slides: 20x each leg  Manual Therapy  MT 1: Knee PROM  MT 3: Tibiofemoral moilizations for improved flexion/extension  MT 4: seated knee distaction - NEXT  Balance/Neuromuscular Re-Education  NMR 1: Quad Sets: 30x  NMR 2: Straight Leg Raises 4 x 5  NMR 3: Short Arc Quads: 30x each leg  NMR 4: Long Arc Quads: 30x each leg  NMR 5: Sciatic nerve glide: 3 x 10 bilateral  Therapeutic Activity  TA 1: Squats  TA 2: Shuttle Squats  TA 3: Step ups  TA 4: Step downs  TA 5: Sise stepping with band    Time Entry(in minutes):  Manual Therapy Time Entry: 11  Neuromuscular Re-Education Time Entry: 27  Therapeutic Exercise Time Entry: 16    Assessment & Plan   Assessment: Patient reports for follow up treatment with no change in complaint of symptoms. Patient tolerated today's session with no complaints. Today's  I personally spoke with the patient and his wife about the submucosal lesion seen on endoscopy and instructed them to follow up with Dr. Liang. I also informed them of the Carrillo findings.     Interpretations   This was an abnormal study OFF therapy.  The overall % acid exposure of 10.1% and DeMeester overall of 36.0 was abnormal indicating ongoing acid reflux.  There were 2 occurrences of heartburn and 1 of chest pain symptoms. SI was 50 (1/2) and 100 (1/1) respectively overall for acid reflux which inconclusive due to the very few number of symptoms. The SAP was 72.7 and 83.7 respectively indicating a less than complete correlation. The Carrillo fell off early on the third day. There was adequate data to interpret this study. Interpret within clinical context.     Day 1  6.8% acid exposure, DeMeester Score 24.4  Day 2  11.7% acid exposure, DeMeester Score 41.3  Day 3  12.1% acid exposure, DeMeester Score 40.4    This study was interpreted on 3/22/22    Wording of procedure description and interpretation was adapted from [employer] Weekly Motility Conference (2018). Analysis of the data was self-performed.            DO Fredy Alonzo DO   of Medicine  Director, Esophageal Disorders Program  Division of Gastroenterology, Hepatology, and Nutrition  [facility]     treatment included stretching and stability interventions. Most exercises were performed without added resistance due to working on control. They remain limited in weakness and stiffness. Pateint is progressing well towards their goals.  Evaluation/Treatment Tolerance: Patient tolerated treatment well    Patient will continue to benefit from skilled outpatient physical therapy to address the deficits listed in the problem list box on initial evaluation, provide pt/family education and to maximize pt's level of independence in the home and community environment.     Patient's spiritual, cultural, and educational needs considered and patient agreeable to plan of care and goals.           Plan: Continue to progress plan of care as tolerated.    Goals:   Active       LTG       Patient will demonstrate a 4+/5 or better on all tested MMT in order to improvr functional mobility and reduce risks for compensation.   (Progressing)       Start:  05/02/25    Expected End:  07/11/25            Patinet will demonstrate a FOTO score with a 10% improvement in order to show improved function.   (Progressing)       Start:  05/02/25    Expected End:  07/11/25            Patient will have 90/90 HS length test of 20 degrees of better in order to demonstrate improved lower extremity flexibility (Progressing)       Start:  05/02/25    Expected End:  07/11/25               STG       Patient will demonstrate 100% compliance with their Home Exercise Porgram in order to improve their current level of function.   (Progressing)       Start:  05/02/25    Expected End:  06/06/25            Patient will demonstrate knee extension degrees of +5 in order to improve functional mobility.    (Progressing)       Start:  05/02/25    Expected End:  06/06/25            Patient will demonstrate 120 degrees of right knee flexion in order to improve functional mobility.    (Progressing)       Start:  05/02/25    Expected End:  06/06/25                Ronny  Aron, PT

## 2025-06-02 DIAGNOSIS — F51.01 PRIMARY INSOMNIA: ICD-10-CM

## 2025-06-02 RX ORDER — TRAZODONE HYDROCHLORIDE 50 MG/1
50 TABLET ORAL AT BEDTIME
Qty: 90 TABLET | Refills: 0 | Status: SHIPPED | OUTPATIENT
Start: 2025-06-02

## 2025-06-23 ENCOUNTER — TRANSFERRED RECORDS (OUTPATIENT)
Dept: HEALTH INFORMATION MANAGEMENT | Facility: CLINIC | Age: 65
End: 2025-06-23
Payer: COMMERCIAL

## 2025-07-07 ENCOUNTER — OFFICE VISIT (OUTPATIENT)
Dept: DERMATOLOGY | Facility: CLINIC | Age: 65
End: 2025-07-07
Payer: COMMERCIAL

## 2025-07-07 DIAGNOSIS — D18.01 CHERRY ANGIOMA: ICD-10-CM

## 2025-07-07 DIAGNOSIS — D22.9 MULTIPLE NEVI: ICD-10-CM

## 2025-07-07 DIAGNOSIS — Z12.83 SKIN CANCER SCREENING: Primary | ICD-10-CM

## 2025-07-07 DIAGNOSIS — L57.0 ACTINIC KERATOSIS: ICD-10-CM

## 2025-07-07 DIAGNOSIS — L82.1 SEBORRHEIC KERATOSIS: ICD-10-CM

## 2025-07-07 DIAGNOSIS — Z85.820 HISTORY OF MELANOMA: ICD-10-CM

## 2025-07-07 DIAGNOSIS — L81.4 LENTIGO: ICD-10-CM

## 2025-07-07 ASSESSMENT — PAIN SCALES - GENERAL: PAINLEVEL_OUTOF10: NO PAIN (0)

## 2025-07-07 NOTE — LETTER
7/7/2025      Ronald Ingram  [address] [facility] [place] [address]      Dear Colleague,    Thank you for referring your patient, Ronald Ingram, to the [facility]. Please see a copy of my visit note below.    [facility] Dermatology Note  Encounter Date: Jul 7, 2025  Office Visit     Reviewed patients past medical history and pertinent chart review prior to patients visit today.     Dermatology Problem List:  Last skin check: 7/7/2025    1. Melanoma, L upper arm Breslow at least 2.6 mm, >1 mitosis, + ulceration. Bergholz node negative. Diagnosis 6/6/17.   2. Benign pigmented nevi   3. Monitoring macule on the R dorsal toe  4. Aks - calcipotriene +5FU in 4/22  5. Seborrheic keratoses  6. Tanning bed use history   ____________________________________________    Assessment & Plan:     # Actinic keratoses, left cheek x5, left temple, left helix, right cheek, right preauricular area, and right temple  Actinic keratoses are pre-cancerous skin growths caused by sun exposure. Treatment is recommended and medically indicated. Treated with cryotherapy as outlined below.     Procedures performed:   - Cryotherapy procedure note, location(s): See physical exam. After verbal consent and discussion of risks and benefits including, but not limited to, dyspigmentation/scar, blister, and pain, 10 lesion(s) was(were) treated with 1-2 mm freeze border for 1-2 cycles with liquid nitrogen. Post cryotherapy instructions were provided.     # History of melanoma on the left upper arm, no clinical evidence of recurrence.   - No signs of recurrence. Continued observation recommended. Due to the history of melanoma, there is an increased risk of additional melanoma in the future.  Recommended monthly self-skin examinations to evaluate for new, changing, symptomatic, or otherwise worrisome lesions.  Signs and symptoms of melanoma and nonmelanoma skin cancer discussed.  - ABCDEs: Counseled  ABCDEs of melanoma: Asymmetry, Border (irregularity), Color (not uniform, changes in color), Diameter (greater than 6 mm which is about the size of a pencil eraser), and Evolving (any changes in preexisting moles).  - Sun protection recommended: Counseled SPF 30+ sunscreen, UPF clothing, sun avoidance, tanning bed avoidance.  - Return to Dermatology in 12 months for a full skin exam.    # Multiple nevi, trunk and extremities  # Solar lentigines  - Nevi demonstrate no concerning features on dermoscopy. We discussed the importance of self exams at home.   - ABCDEs: Counseled ABCDEs of melanoma: Asymmetry, Border (irregularity), Color (not uniform, changes in color), Diameter (greater than 6 mm which is about the size of a pencil eraser), and Evolving (any changes in preexisting moles).  - Sun protection: Counseled SPF 30+ sunscreen, UPF clothing, sun avoidance, tanning bed avoidance.    # Cherry angiomas  # Seborrheic keratoses  - We discussed the benign nature of the skin lesions. No treatment required. Continued observation recommended. Follow up with any concerns.      Follow-up:  Annual for follow up full body skin exam, as needed for new or changing lesions or new concerns    All risks, benefits and alternatives were discussed with patient.  Patient is in agreement and understands the assessment and plan.  All questions were answered.  Nica Sutherland PA-C  [facility] Dermatology  ____________________________________________    CC: Skin cancer screening exam. History of melanoma    HPI:  Mr. Ronald Ingram is a(n) 64 year old male who presents today as a return patient for a full body skin cancer screening. The patient has a history of malignant melanoma as outlined above.  He was last seen in dermatology on 1/20/2023 for a spot check, at which time seborrheic keratoses of the left vertex scalp were identified.      Today, patient has concerns today about a lesion on the left cheek. This lesion has been  present for 6-10 months. It does not itch, bleed, or cause pain. It is red and scaly.  He has also noticed a few other scaly spots on his face    Patient is diligent with photoprotection. Patient is otherwise feeling well, without additional skin concerns.    SYSTEMS REVIEW  The patient denies unintended weight loss, fevers, chills, night sweats, swollen glands, or changing pigmented skin lesions.     Physical Exam:  LYMPH: No axillary, occipital, postauricular, cervical, or supraclavicular lymphadenopathy.   SKIN: Total skin excluding the genitalia areas was performed. The exam included the scalp, face, neck, bilateral arms, chest, back, abdomen, bilateral legs, digits, mons pubis, buttocks, and nails.   - Maki II.  -left cheek x5, left temple, left helix, right cheek, right preauricular area, and right temple, pink macule(s) with overlying adherent scale consistent with an actinic keratosis   -left upper arm/left shoulder region, well healed scar with no recurrence, nodularity, repigmentation, or pain to palpation.   -multiple tan/brown flat round macules and raised papules scattered throughout trunk, extremities, and face. No worrisome features for malignancy noted on examination.  -scattered tan, homogenous macules scattered on sun exposed areas of trunk, extremities, and face  -scattered waxy, stuck on tan/brown papules and patches on the trunk and extremities, including the right posterior forearm  -few 1-2mm red dome shaped symmetric papules scattered on the trunk and extremities    - No other lesions of concern on areas examined.     Medications:  Current Outpatient Medications   Medication Sig Dispense Refill     albuterol (PROAIR HFA/PROVENTIL HFA/VENTOLIN HFA) 108 (90 Base) MCG/ACT inhaler Inhale 2 puffs into the lungs every 6 hours as needed for shortness of breath, wheezing or cough 18 g 0     albuterol (PROAIR HFA/PROVENTIL HFA/VENTOLIN HFA) 108 (90 Base) MCG/ACT inhaler Inhale 2 puffs into the  lungs every 6 hours as needed for shortness of breath / dyspnea or wheezing 18 g 0     atorvastatin (LIPITOR) 10 MG tablet Take 1 tablet (10 mg) by mouth daily. 90 tablet 3     cyanocobalamin (VITAMIN B-12) 1000 MCG tablet Take 1 tablet (1,000 mcg) by mouth daily       fenofibrate (TRIGLIDE/LOFIBRA) 160 MG tablet Take 1 tablet (160 mg) by mouth daily. 90 tablet 3     fish oil-omega-3 fatty acids (OMEGA 3) 1000 MG capsule Take 1 capsule by mouth daily. 90 capsule 3     gabapentin (NEURONTIN) 300 MG capsule Take 1 capsule (300 mg) by mouth at bedtime. 90 capsule 3     Melatonin 10 MG TABS tablet Take 10 mg by mouth At Bedtime       Multiple Vitamins-Minerals (ONE-A-DAY ENERGY) TABS Take  by mouth.       sildenafil (VIAGRA) 100 MG tablet Take 0.5-1 tablets ( mg) by mouth daily as needed (erectile dysfunction) 12 tablet 1     traZODone (DESYREL) 50 MG tablet TAKE ONE TABLET BY MOUTH EVERY NIGHT AT BEDTIME 90 tablet 0     vitamin D3 (CHOLECALCIFEROL) 50 mcg (2000 units) tablet Take 1 tablet (50 mcg) by mouth daily       No current facility-administered medications for this visit.      Past Medical History:   Patient Active Problem List   Diagnosis     Mixed hyperlipidemia     Labral tear of shoulder- left; work related     CARDIOVASCULAR SCREENING; LDL GOAL LESS THAN 160     Pain in joint involving ankle and foot     Ankle joint stiffness     Right shoulder pain     Grief     AK (actinic keratosis)     Solar lentigo     Seborrheic keratosis     Melanoma of left upper arm (H)     SK (seborrheic keratosis)     Metastatic malignant melanoma (H)     Elevated cholesterol     Tongue discoloration     Primary insomnia     Secondary malignant neoplasm of unspecified site (CODE) (H)     Acute pain of right shoulder     Past Medical History:   Diagnosis Date     GERD (gastroesophageal reflux disease)      Hyperlipidemia      Malignant melanoma (H)        CC Referred Self, MD  No address on file on close of this  encounter.    Again, thank you for allowing me to participate in the care of your patient.        Sincerely,        Darby Sutherland PA-C    Electronically signed

## 2025-07-07 NOTE — PATIENT INSTRUCTIONS
Cryotherapy    What is it?  Use of a very cold liquid, such as liquid nitrogen, to freeze and destroy abnormal skin cells that need to be removed    What should I expect?  Tenderness and redness  A small blister that might grow and fill with dark purple blood. There may be crusting.  More than one treatment may be needed if the lesions do not go away.    How do I care for the treated area?  Gently wash the area with your hands when bathing.  Use a thin layer of Vaseline to help with healing. You may use a Band-Aid.   The area should heal within 7-10 days and may leave behind a pink or lighter color.   Do not use an antibiotic or Neosporin ointment.   You may take acetaminophen (Tylenol) for pain.     Call your doctor if you have:  Severe pain  Signs of infection (warmth, redness, cloudy yellow drainage, and or a bad smell)  Questions or concerns    Who should I call with questions?      [facility]: [phone]      [facility]: [phone]      For urgent needs outside of business hours call the [facility] at [phone] and ask for the dermatology resident on call         Proper skin care from [facility]:    -Eliminate harsh soaps as they strip the natural oils from the skin, often resulting in dry itchy skin ( i.e. Dial, Zest, Dina Spring)  -Use mild soaps such as Cetaphil or Dove Sensitive Skin in the shower. You do not need to use soap on arms, legs, and trunk every time you shower unless visibly soiled.   -Avoid hot or cold showers.  -After showering, lightly dry off and apply moisturizing within 2-3 minutes. This will help trap moisture in the skin.   -Aggressive use of a moisturizer at least 1-2 times a day to the entire body (including -Vanicream, Cetaphil, Aquaphor or Cerave) and moisturize hands after every washing.  -We recommend using moisturizers that come in a tub that needs to be scooped out, not a pump. This has more  of an oil base. It will hold moisture in your skin much better than a water base moisturizer. The above recommended are non-pore clogging.      Wear a sunscreen with at least SPF 30 on your face, ears, neck and V of the chest daily. Wear sunscreen on other areas of the body if those areas are exposed to the sun throughout the day. Sunscreens can contain physical and/or chemical blockers. Physical blockers are less likely to clog pores, these include zinc oxide and titanium dioxide. Reapply every two hour and after swimming.     Sunscreen examples: https://www.ewg.org/sunscreen/    UV radiation  UVA radiation remains constant throughout the day and throughout the year. It is a longer wavelength than UVB and therefore penetrates deeper into the skin leading to immediate and delayed tanning, photoaging, and skin cancer. 70-80% of UVA and UVB radiation occurs between the hours of 10am-2pm.  UVB radiation  UVB radiation causes the most harmful effects and is more significant during the summer months. However, snow and ice can reflect UVB radiation leading to skin damage during the winter months as well. UVB radiation is responsible for tanning, burning, inflammation, delayed erythema (pinkness), pigmentation (brown spots), and skin cancer.     I recommend self monthly full body exams and yearly full body exams with a dermatology provider. If you develop a new or changing lesion please follow up for examination. Most skin cancers are pink and scaly or pink and pearly. However, we do see blue/brown/black skin cancers.  Consider the ABCDEs of melanoma when giving yourself your monthly full body exam ( don't forget the groin, buttocks, feet, toes, etc). A-asymmetry, B-borders, C-color, D-diameter, E-elevation or evolving. If you see any of these changes please follow up in clinic. If you cannot see your back I recommend purchasing a hand held mirror to use with a larger wall mirror.       Checking for Skin Cancer  You can  find cancer early by checking your skin each month. There are 3 kinds of skin cancer. They are melanoma, basal cell carcinoma, and squamous cell carcinoma. Doing monthly skin checks is the best way to find new marks or skin changes. Follow the instructions below for checking your skin.   The ABCDEs of checking moles for melanoma   Check your moles or growths for signs of melanoma using ABCDE:   Asymmetry: the sides of the mole or growth don t match  Border: the edges are ragged, notched, or blurred  Color: the color within the mole or growth varies  Diameter: the mole or growth is larger than 6 mm (size of a pencil eraser)  Evolving: the size, shape, or color of the mole or growth is changing (evolving is not shown in the images below)    Checking for other types of skin cancer  Basal cell carcinoma or squamous cell carcinoma have symptoms such as:     A spot or mole that looks different from all other marks on your skin  Changes in how an area feels, such as itching, tenderness, or pain  Changes in the skin's surface, such as oozing, bleeding, or scaliness  A sore that does not heal  New swelling or redness beyond the border of a mole    Who s at risk?  Anyone can get skin cancer. But you are at greater risk if you have:   Fair skin, light-colored hair, or light-colored eyes  Many moles or abnormal moles on your skin  A history of sunburns from sunlight or tanning beds  A family history of skin cancer  A history of exposure to radiation or chemicals  A weakened immune system  If you have had skin cancer in the past, you are at risk for recurring skin cancer.   How to check your skin  Do your monthly skin checkups in front of a full-length mirror. Check all parts of your body, including your:   Head (ears, face, neck, and scalp)  Torso (front, back, and sides)  Arms (tops, undersides, upper, and lower armpits)  Hands (palms, backs, and fingers, including under the nails)  Buttocks and genitals  Legs (front, back,  and sides)  Feet (tops, soles, toes, including under the nails, and between toes)  If you have a lot of moles, take digital photos of them each month. Make sure to take photos both up close and from a distance. These can help you see if any moles change over time.   Most skin changes are not cancer. But if you see any changes in your skin, call your doctor right away. Only he or she can diagnose a problem. If you have skin cancer, seeing your doctor can be the first step toward getting the treatment that could save your life.   [employer] last reviewed this educational content on 4/1/2019 2000-2020 [employer]. [address] [place] [address], [place] [address]. All rights reserved. This information is not intended as a substitute for professional medical care. Always follow your healthcare professional's instructions.       When should I call my doctor?  If you are worsening or not improving, please, contact us or seek urgent care as noted below.     Who should I call with questions (adults)?    [facility] [phone]  For urgent needs outside of business hours call the [facility] at [phone] and ask for the dermatology resident on call to be paged  If this is a medical emergency and you are unable to reach an ER, Call [phone]      If you need a prescription refill, please contact your pharmacy. Refills are approved or denied by our Physicians during normal business hours, Monday through Friday.  Per office policy, refills will not be granted if you have not been seen within the past year (or sooner depending on the condition).

## 2025-07-07 NOTE — PROGRESS NOTES
[facility] Dermatology Note  Encounter Date: Jul 7, 2025  Office Visit     Reviewed patients past medical history and pertinent chart review prior to patients visit today.     Dermatology Problem List:  Last skin check: 7/7/2025    1. Melanoma, L upper arm Breslow at least 2.6 mm, >1 mitosis, + ulceration. [place] node negative. Diagnosis 6/6/17.   2. Benign pigmented nevi   3. Monitoring macule on the R dorsal toe  4. Aks - calcipotriene +5FU in 4/22  5. Seborrheic keratoses  6. Tanning bed use history   ____________________________________________    Assessment & Plan:     # Actinic keratoses, left cheek x5, left temple, left helix, right cheek, right preauricular area, and right temple  Actinic keratoses are pre-cancerous skin growths caused by sun exposure. Treatment is recommended and medically indicated. Treated with cryotherapy as outlined below.     Procedures performed:   - Cryotherapy procedure note, location(s): See physical exam. After verbal consent and discussion of risks and benefits including, but not limited to, dyspigmentation/scar, blister, and pain, 10 lesion(s) was(were) treated with 1-2 mm freeze border for 1-2 cycles with liquid nitrogen. Post cryotherapy instructions were provided.     # History of melanoma on the left upper arm, no clinical evidence of recurrence.   - No signs of recurrence. Continued observation recommended. Due to the history of melanoma, there is an increased risk of additional melanoma in the future.  Recommended monthly self-skin examinations to evaluate for new, changing, symptomatic, or otherwise worrisome lesions.  Signs and symptoms of melanoma and nonmelanoma skin cancer discussed.  - ABCDEs: Counseled ABCDEs of melanoma: Asymmetry, Border (irregularity), Color (not uniform, changes in color), Diameter (greater than 6 mm which is about the size of a pencil eraser), and Evolving (any changes in preexisting moles).  - Sun protection recommended:  Counseled SPF 30+ sunscreen, UPF clothing, sun avoidance, tanning bed avoidance.  - Return to Dermatology in 12 months for a full skin exam.    # Multiple nevi, trunk and extremities  # Solar lentigines  - Nevi demonstrate no concerning features on dermoscopy. We discussed the importance of self exams at home.   - ABCDEs: Counseled ABCDEs of melanoma: Asymmetry, Border (irregularity), Color (not uniform, changes in color), Diameter (greater than 6 mm which is about the size of a pencil eraser), and Evolving (any changes in preexisting moles).  - Sun protection: Counseled SPF 30+ sunscreen, UPF clothing, sun avoidance, tanning bed avoidance.    # Cherry angiomas  # Seborrheic keratoses  - We discussed the benign nature of the skin lesions. No treatment required. Continued observation recommended. Follow up with any concerns.      Follow-up:  Annual for follow up full body skin exam, as needed for new or changing lesions or new concerns    All risks, benefits and alternatives were discussed with patient.  Patient is in agreement and understands the assessment and plan.  All questions were answered.  Nica Sutherland PA-C  [facility]  ____________________________________________    CC: Skin cancer screening exam. History of melanoma    HPI:  Mr. Ronald Ingram is a(n) 64 year old male who presents today as a return patient for a full body skin cancer screening. The patient has a history of malignant melanoma as outlined above.  He was last seen in dermatology on 1/20/2023 for a spot check, at which time seborrheic keratoses of the left vertex scalp were identified.      Today, patient has concerns today about a lesion on the left cheek. This lesion has been present for 6-10 months. It does not itch, bleed, or cause pain. It is red and scaly.  He has also noticed a few other scaly spots on his face    Patient is diligent with photoprotection. Patient is otherwise feeling well, without additional skin  concerns.    SYSTEMS REVIEW  The patient denies unintended weight loss, fevers, chills, night sweats, swollen glands, or changing pigmented skin lesions.     Physical Exam:  LYMPH: No axillary, occipital, postauricular, cervical, or supraclavicular lymphadenopathy.   SKIN: Total skin excluding the genitalia areas was performed. The exam included the scalp, face, neck, bilateral arms, chest, back, abdomen, bilateral legs, digits, mons pubis, buttocks, and nails.   - Maki II.  -left cheek x5, left temple, left helix, right cheek, right preauricular area, and right temple, pink macule(s) with overlying adherent scale consistent with an actinic keratosis   -left upper arm/left shoulder region, well healed scar with no recurrence, nodularity, repigmentation, or pain to palpation.   -multiple tan/brown flat round macules and raised papules scattered throughout trunk, extremities, and face. No worrisome features for malignancy noted on examination.  -scattered tan, homogenous macules scattered on sun exposed areas of trunk, extremities, and face  -scattered waxy, stuck on tan/brown papules and patches on the trunk and extremities, including the right posterior forearm  -few 1-2mm red dome shaped symmetric papules scattered on the trunk and extremities    - No other lesions of concern on areas examined.     Medications:  Current Outpatient Medications   Medication Sig Dispense Refill    albuterol (PROAIR HFA/PROVENTIL HFA/VENTOLIN HFA) 108 (90 Base) MCG/ACT inhaler Inhale 2 puffs into the lungs every 6 hours as needed for shortness of breath, wheezing or cough 18 g 0    albuterol (PROAIR HFA/PROVENTIL HFA/VENTOLIN HFA) 108 (90 Base) MCG/ACT inhaler Inhale 2 puffs into the lungs every 6 hours as needed for shortness of breath / dyspnea or wheezing 18 g 0    atorvastatin (LIPITOR) 10 MG tablet Take 1 tablet (10 mg) by mouth daily. 90 tablet 3    cyanocobalamin (VITAMIN B-12) 1000 MCG tablet Take 1 tablet (1,000 mcg) by  mouth daily      fenofibrate (TRIGLIDE/LOFIBRA) 160 MG tablet Take 1 tablet (160 mg) by mouth daily. 90 tablet 3    fish oil-omega-3 fatty acids (OMEGA 3) 1000 MG capsule Take 1 capsule by mouth daily. 90 capsule 3    gabapentin (NEURONTIN) 300 MG capsule Take 1 capsule (300 mg) by mouth at bedtime. 90 capsule 3    Melatonin 10 MG TABS tablet Take 10 mg by mouth At Bedtime      Multiple Vitamins-Minerals (ONE-A-DAY ENERGY) TABS Take  by mouth.      sildenafil (VIAGRA) 100 MG tablet Take 0.5-1 tablets ( mg) by mouth daily as needed (erectile dysfunction) 12 tablet 1    traZODone (DESYREL) 50 MG tablet TAKE ONE TABLET BY MOUTH EVERY NIGHT AT BEDTIME 90 tablet 0    vitamin D3 (CHOLECALCIFEROL) 50 mcg (2000 units) tablet Take 1 tablet (50 mcg) by mouth daily       No current facility-administered medications for this visit.      Past Medical History:   Patient Active Problem List   Diagnosis    Mixed hyperlipidemia    Labral tear of shoulder- left; work related    CARDIOVASCULAR SCREENING; LDL GOAL LESS THAN 160    Pain in joint involving ankle and foot    Ankle joint stiffness    Right shoulder pain    Grief    AK (actinic keratosis)    Solar lentigo    Seborrheic keratosis    Melanoma of left upper arm (H)    SK (seborrheic keratosis)    Metastatic malignant melanoma (H)    Elevated cholesterol    Tongue discoloration    Primary insomnia    Secondary malignant neoplasm of unspecified site (CODE) (H)    Acute pain of right shoulder     Past Medical History:   Diagnosis Date    GERD (gastroesophageal reflux disease)     Hyperlipidemia     Malignant melanoma (H)        CC Referred Self, MD  No address on file on close of this encounter.

## 2025-07-17 ENCOUNTER — TRANSFERRED RECORDS (OUTPATIENT)
Dept: HEALTH INFORMATION MANAGEMENT | Facility: CLINIC | Age: 65
End: 2025-07-17
Payer: COMMERCIAL

## 2025-08-19 ENCOUNTER — ANCILLARY PROCEDURE (OUTPATIENT)
Dept: GENERAL RADIOLOGY | Facility: CLINIC | Age: 65
End: 2025-08-19
Attending: NURSE PRACTITIONER
Payer: COMMERCIAL

## 2025-08-19 ENCOUNTER — OFFICE VISIT (OUTPATIENT)
Dept: FAMILY MEDICINE | Facility: CLINIC | Age: 65
End: 2025-08-19
Payer: COMMERCIAL

## 2025-08-19 VITALS
SYSTOLIC BLOOD PRESSURE: 118 MMHG | HEART RATE: 66 BPM | OXYGEN SATURATION: 97 % | HEIGHT: 73 IN | TEMPERATURE: 98.3 F | DIASTOLIC BLOOD PRESSURE: 75 MMHG | BODY MASS INDEX: 29.42 KG/M2 | RESPIRATION RATE: 13 BRPM | WEIGHT: 222 LBS

## 2025-08-19 DIAGNOSIS — M25.552 PAIN IN JOINT INVOLVING PELVIC REGION AND THIGH, LEFT: ICD-10-CM
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
## 2025-08-19 DIAGNOSIS — M25.551 PAIN IN JOINT INVOLVING PELVIC REGION AND THIGH, RIGHT: Primary | ICD-10-CM

## 2025-08-19 DIAGNOSIS — M17.11 PATELLOFEMORAL ARTHRITIS OF RIGHT KNEE: ICD-10-CM

## 2025-08-19 DIAGNOSIS — M51.369 DEGENERATION OF INTERVERTEBRAL DISC OF LUMBAR REGION, UNSPECIFIED WHETHER PAIN PRESENT: ICD-10-CM

## 2025-08-19 PROCEDURE — 73522 X-RAY EXAM HIPS BI 3-4 VIEWS: CPT | Mod: TC | Performed by: STUDENT IN AN ORGANIZED HEALTH CARE EDUCATION/TRAINING PROGRAM

## 2025-08-19 PROCEDURE — 3074F SYST BP LT 130 MM HG: CPT | Performed by: NURSE PRACTITIONER

## 2025-08-19 PROCEDURE — 99214 OFFICE O/P EST MOD 30 MIN: CPT | Performed by: NURSE PRACTITIONER

## 2025-08-19 PROCEDURE — 3078F DIAST BP <80 MM HG: CPT | Performed by: NURSE PRACTITIONER

## 2025-08-19 PROCEDURE — 73552 X-RAY EXAM OF FEMUR 2/>: CPT | Mod: TC | Performed by: STUDENT IN AN ORGANIZED HEALTH CARE EDUCATION/TRAINING PROGRAM

## 2025-08-19 PROCEDURE — G2211 COMPLEX E/M VISIT ADD ON: HCPCS | Performed by: NURSE PRACTITIONER

## 2025-08-21 ENCOUNTER — TELEPHONE (OUTPATIENT)
Dept: FAMILY MEDICINE | Facility: CLINIC | Age: 65
End: 2025-08-21
Payer: COMMERCIAL

## (undated) DEVICE — DRSG PRIMAPORE 03 1/8X6" 66000318

## (undated) DEVICE — CLIP HORIZON SM RED WIDE SLOT 001201

## (undated) DEVICE — PAD CHUX UNDERPAD 30X30"

## (undated) DEVICE — ESU GROUND PAD ADULT W/CORD E7507

## (undated) DEVICE — SU VICRYL 3-0 SH 27" J316H

## (undated) DEVICE — BNDG COBAN 2"X5YDS CO-FLEX UNSTERILE ASSRTD CLRS LF 5200CP

## (undated) DEVICE — TUBING SUCTION 12"X1/4" N612

## (undated) DEVICE — ENDO BITE BLOCK ADULT OMNI-BLOC

## (undated) DEVICE — ENDO SNARE EXACTO COLD 9MM LOOP 2.4MMX230CM 00711115

## (undated) DEVICE — SUCTION MANIFOLD NEPTUNE 2 SYS 1 PORT 702-025-000

## (undated) DEVICE — ENDO TRAP POLYP E-TRAP 00711099

## (undated) DEVICE — KIT ENDO TURNOVER/PROCEDURE CARRY-ON 101822

## (undated) DEVICE — PACK MINOR CUSTOM ASC

## (undated) DEVICE — GLOVE PROTEXIS POWDER FREE SMT 8.0  2D72PT80X

## (undated) DEVICE — SOL WATER IRRIG 500ML BOTTLE 2F7113

## (undated) DEVICE — SOL NACL 0.9% IRRIG 500ML BOTTLE 2F7123

## (undated) DEVICE — MARKER MARGIN MARKER STD 6 COLOR SGL USE MMS6

## (undated) DEVICE — SPECIMEN CONTAINER W/10% BUFFERED FORMALIN 120ML 591201

## (undated) DEVICE — DRAPE IOBAN INCISE 23X17" 6650EZ

## (undated) DEVICE — PREP PAD ALCOHOL 6818

## (undated) DEVICE — SU SILK 3-0 SH 30" K832H

## (undated) DEVICE — SPECIMEN CONTAINER 3OZ W/FORMALIN 59901

## (undated) DEVICE — SU PDS II 4-0 FS-2 27" Z422H

## (undated) DEVICE — ESU ELEC BLADE 2.75" COATED/INSULATED E1455

## (undated) DEVICE — CLIP HORIZON MED BLUE 002200

## (undated) DEVICE — GOWN IMPERVIOUS 2XL BLUE

## (undated) DEVICE — NDL 25GA 2"  8881200441

## (undated) DEVICE — SU DERMABOND ADVANCED .7ML DNX12

## (undated) DEVICE — NDL BLUNT 18GA 1.5" W/O FILTER 305180

## (undated) DEVICE — SYR 30ML SLIP TIP W/O NDL 302833

## (undated) DEVICE — LINEN TOWEL PACK X5 5464

## (undated) DEVICE — Device

## (undated) DEVICE — SYR 10ML FINGER CONTROL W/O NDL 309695

## (undated) DEVICE — PREP CHLORAPREP 26ML TINTED ORANGE  260815

## (undated) DEVICE — RX BACITRACIN OINTMENT 0.9G 1/32OZ 01680 11109

## (undated) DEVICE — GLOVE EXAM NITRILE LG PF LATEX FREE 5064

## (undated) DEVICE — DRAPE LAP TRANSVERSE 29421

## (undated) RX ORDER — GABAPENTIN 300 MG/1
CAPSULE ORAL
Status: DISPENSED
Start: 2017-07-18

## (undated) RX ORDER — DEXAMETHASONE SODIUM PHOSPHATE 4 MG/ML
INJECTION, SOLUTION INTRA-ARTICULAR; INTRALESIONAL; INTRAMUSCULAR; INTRAVENOUS; SOFT TISSUE
Status: DISPENSED
Start: 2017-07-18

## (undated) RX ORDER — BUPIVACAINE HYDROCHLORIDE AND EPINEPHRINE 2.5; 5 MG/ML; UG/ML
INJECTION, SOLUTION EPIDURAL; INFILTRATION; INTRACAUDAL; PERINEURAL
Status: DISPENSED
Start: 2017-07-18

## (undated) RX ORDER — LIDOCAINE HYDROCHLORIDE 20 MG/ML
INJECTION, SOLUTION EPIDURAL; INFILTRATION; INTRACAUDAL; PERINEURAL
Status: DISPENSED
Start: 2022-04-07

## (undated) RX ORDER — ISOSULFAN BLUE 50 MG/5ML
INJECTION, SOLUTION SUBCUTANEOUS
Status: DISPENSED
Start: 2017-07-18

## (undated) RX ORDER — PROPOFOL 10 MG/ML
INJECTION, EMULSION INTRAVENOUS
Status: DISPENSED
Start: 2022-04-07

## (undated) RX ORDER — ONDANSETRON 2 MG/ML
INJECTION INTRAMUSCULAR; INTRAVENOUS
Status: DISPENSED
Start: 2022-04-07

## (undated) RX ORDER — DEXAMETHASONE SODIUM PHOSPHATE 4 MG/ML
INJECTION, SOLUTION INTRA-ARTICULAR; INTRALESIONAL; INTRAMUSCULAR; INTRAVENOUS; SOFT TISSUE
Status: DISPENSED
Start: 2022-04-07

## (undated) RX ORDER — GLYCOPYRROLATE 0.2 MG/ML
INJECTION, SOLUTION INTRAMUSCULAR; INTRAVENOUS
Status: DISPENSED
Start: 2022-04-07

## (undated) RX ORDER — FENTANYL CITRATE 50 UG/ML
INJECTION, SOLUTION INTRAMUSCULAR; INTRAVENOUS
Status: DISPENSED
Start: 2022-04-07

## (undated) RX ORDER — NEOSTIGMINE METHYLSULFATE 1 MG/ML
VIAL (ML) INJECTION
Status: DISPENSED
Start: 2022-04-07

## (undated) RX ORDER — OXYCODONE AND ACETAMINOPHEN 5; 325 MG/1; MG/1
TABLET ORAL
Status: DISPENSED
Start: 2017-07-18

## (undated) RX ORDER — ONDANSETRON 2 MG/ML
INJECTION INTRAMUSCULAR; INTRAVENOUS
Status: DISPENSED
Start: 2017-07-18

## (undated) RX ORDER — FENTANYL CITRATE 50 UG/ML
INJECTION, SOLUTION INTRAMUSCULAR; INTRAVENOUS
Status: DISPENSED
Start: 2017-07-18

## (undated) RX ORDER — PROPOFOL 10 MG/ML
INJECTION, EMULSION INTRAVENOUS
Status: DISPENSED
Start: 2017-07-18

## (undated) RX ORDER — ACETAMINOPHEN 325 MG/1
TABLET ORAL
Status: DISPENSED
Start: 2017-07-18